# Patient Record
Sex: FEMALE | Race: WHITE | HISPANIC OR LATINO | Employment: PART TIME | URBAN - METROPOLITAN AREA
[De-identification: names, ages, dates, MRNs, and addresses within clinical notes are randomized per-mention and may not be internally consistent; named-entity substitution may affect disease eponyms.]

---

## 2017-02-10 ENCOUNTER — OFFICE VISIT (OUTPATIENT)
Dept: FAMILY MEDICINE | Facility: CLINIC | Age: 57
End: 2017-02-10
Payer: COMMERCIAL

## 2017-02-10 VITALS
TEMPERATURE: 98.4 F | HEART RATE: 64 BPM | DIASTOLIC BLOOD PRESSURE: 72 MMHG | WEIGHT: 135 LBS | HEIGHT: 61 IN | SYSTOLIC BLOOD PRESSURE: 110 MMHG | BODY MASS INDEX: 25.49 KG/M2 | RESPIRATION RATE: 16 BRPM
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
## 2017-02-10 DIAGNOSIS — Z11.59 NEED FOR HEPATITIS C SCREENING TEST: ICD-10-CM

## 2017-02-10 DIAGNOSIS — Z00.00 ROUTINE GENERAL MEDICAL EXAMINATION AT A HEALTH CARE FACILITY: Primary | ICD-10-CM

## 2017-02-10 PROCEDURE — 80048 BASIC METABOLIC PNL TOTAL CA: CPT | Performed by: FAMILY MEDICINE

## 2017-02-10 PROCEDURE — 99396 PREV VISIT EST AGE 40-64: CPT | Performed by: FAMILY MEDICINE

## 2017-02-10 PROCEDURE — 86803 HEPATITIS C AB TEST: CPT | Performed by: FAMILY MEDICINE

## 2017-02-10 PROCEDURE — 36415 COLL VENOUS BLD VENIPUNCTURE: CPT | Performed by: FAMILY MEDICINE

## 2017-02-10 RX ORDER — OSELTAMIVIR PHOSPHATE 75 MG/1
CAPSULE ORAL
COMMUNITY
Start: 2017-02-08 | End: 2017-02-10

## 2017-02-10 RX ORDER — OSELTAMIVIR PHOSPHATE 75 MG/1
75 CAPSULE ORAL
COMMUNITY
Start: 2017-02-08 | End: 2017-02-13

## 2017-02-10 RX ORDER — IBUPROFEN 800 MG/1
TABLET, FILM COATED ORAL
COMMUNITY
Start: 2017-02-08 | End: 2018-03-23

## 2017-02-10 NOTE — LETTER
[facility]  [address] [place], [address]  [phone]      February 13, 2017    Mary Carbajal  [address] [facility]   [address] [place] [id number]          Dear Mary,    The results of your recent tests were normal.  Enclosed is a copy of the results.  It was a pleasure to see you at your last appointment.  Results for orders placed or performed in visit on 02/10/17   Hepatitis C antibody   Result Value Ref Range    Hepatitis C Antibody  NR     Nonreactive   Assay performance characteristics have not been established for newborns,   infants, and children     Basic metabolic panel   Result Value Ref Range    Sodium 139 133 - 144 mmol/L    Potassium 4.8 3.4 - 5.3 mmol/L    Chloride 106 94 - 109 mmol/L    Carbon Dioxide 27 20 - 32 mmol/L    Anion Gap 6 3 - 14 mmol/L    Glucose 98 70 - 99 mg/dL    Urea Nitrogen 13 7 - 30 mg/dL    Creatinine 0.90 0.52 - 1.04 mg/dL    GFR Estimate 65 >60 mL/min/1.7m2    GFR Estimate If Black 79 >60 mL/min/1.7m2    Calcium 8.8 8.5 - 10.1 mg/dL       If you have any questions or concerns, please call myself or my nurse at [phone].          Sincerely,    Arti Allen MD  [id number]

## 2017-02-10 NOTE — MR AVS SNAPSHOT
After Visit Summary   2/10/2017    Mary Carbajal    MRN: [id number]           Patient Information     Date Of Birth          1960        Visit Information        Provider Department      2/10/2017 10:30 AM Arti Allen MD; [employer] [facility]        Today's Diagnoses     Need for hepatitis C screening test    -  1     Routine general medical examination at a health care facility           Care Instructions      Preventive Health Recommendations  Female Ages 50 - 64    Yearly exam: See your health care provider every year in order to  o Review health changes.   o Discuss preventive care.    o Review your medicines if your doctor has prescribed any.      Get a Pap test every three years (unless you have an abnormal result and your provider advises testing more often).    If you get Pap tests with HPV test, you only need to test every 5 years, unless you have an abnormal result.     You do not need a Pap test if your uterus was removed (hysterectomy) and you have not had cancer.    You should be tested each year for STDs (sexually transmitted diseases) if you're at risk.     Have a mammogram every 1 to 2 years.    Have a colonoscopy at age 50, or have a yearly FIT test (stool test). These exams screen for colon cancer.      Have a cholesterol test every 5 years, or more often if advised.    Have a diabetes test (fasting glucose) every three years. If you are at risk for diabetes, you should have this test more often.     If you are at risk for osteoporosis (brittle bone disease), think about having a bone density scan (DEXA).    Shots: Get a flu shot each year. Get a tetanus shot every 10 years.    Nutrition:     Eat at least 5 servings of fruits and vegetables each day.    Eat whole-grain bread, whole-wheat pasta and brown rice instead of white grains and rice.    Talk to your provider about Calcium and Vitamin D.     Lifestyle    Exercise at  "least 150 minutes a week (30 minutes a day, 5 days a week). This will help you control your weight and prevent disease.    Limit alcohol to one drink per day.    No smoking.     Wear sunscreen to prevent skin cancer.     See your dentist every six months for an exam and cleaning.    See your eye doctor every 1 to 2 years.            Follow-ups after your visit        Future tests that were ordered for you today     Open Future Orders        Priority Expected Expires Ordered    *MA Screening Digital Bilateral Routine  2/10/2018 2/10/2017            Who to contact     If you have questions or need follow up information about today's clinic visit or your schedule please contact [facility] directly at [phone].  Normal or non-critical lab and imaging results will be communicated to you by MyChart, letter or phone within 4 business days after the clinic has received the results. If you do not hear from us within 7 days, please contact the clinic through Fundlyhart or phone. If you have a critical or abnormal lab result, we will notify you by phone as soon as possible.  Submit refill requests through MainOne or call your pharmacy and they will forward the refill request to us. Please allow 3 business days for your refill to be completed.          Additional Information About Your Visit        FundlyharStockdrift Information     MainOne lets you send messages to your doctor, view your test results, renew your prescriptions, schedule appointments and more. To sign up, go to [email or web address] . Click on \"Log in\" on the left side of the screen, which will take you to the Welcome page. Then click on \"Sign up Now\" on the right side of the page.     You will be asked to enter the access code listed below, as well as some personal information. Please follow the directions to create your username and password.     Your access code is: [email or web address]  Expires: 2017 10:49 AM     Your access code will  in 90 " "days. If you need help or a new code, please call your [facility] clinic or [phone].        Care EveryWhere ID     This is your Care EveryWhere ID. This could be used by other organizations to access your [facility] medical records  [id number]        Your Vitals Were     Pulse Temperature Respirations Height BMI (Body Mass Index) Breastfeeding?    64 98.4  F (36.9  C) (Oral) 16 5' 1.25\" (1.556 m) 25.29 kg/m2 No       Blood Pressure from Last 3 Encounters:   02/10/17 110/72   11/18/16 102/60   10/20/16 112/62    Weight from Last 3 Encounters:   02/10/17 135 lb (61.236 kg)   11/18/16 133 lb (60.328 kg)   10/20/16 133 lb 8 oz (60.555 kg)              We Performed the Following     Basic metabolic panel     Hepatitis C antibody        Primary Care Provider Office Phone # Fax #    Elijah Ng [phone]       [facility] [id number] [place] [address]        Thank you!     Thank you for choosing [facility]  for your care. Our goal is always to provide you with excellent care. Hearing back from our patients is one way we can continue to improve our services. Please take a few minutes to complete the written survey that you may receive in the mail after your visit with us. Thank you!             Your Updated Medication List - Protect others around you: Learn how to safely use, store and throw away your medicines at www.disposemymeds.org.          This list is accurate as of: 2/10/17 10:49 AM.  Always use your most recent med list.                   Brand Name Dispense Instructions for use    fluticasone 50 MCG/ACT spray    FLONASE    1 Bottle    Spray 2 sprays into both nostrils daily       ibuprofen 800 MG tablet    ADVIL/MOTRIN     1 tab prn       ranitidine 150 MG capsule    ZANTAC    90 capsule    Take 1 capsule (150 mg) by mouth every evening       TAMIFLU 75 MG capsule   Generic drug:  oseltamivir      Take 75 mg by mouth 1 tab TWICE A DAY x5 days "
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
## 2017-02-10 NOTE — NURSING NOTE
"Chief Complaint   Patient presents with     Physical     PE---no pap, had normal pap 10/2015     Initial /72 mmHg  Pulse 64  Temp(Src) 98.4  F (36.9  C) (Oral)  Resp 16  Ht 5' 1.25\" (1.556 m)  Wt 135 lb (61.236 kg)  BMI 25.29 kg/m2  Breastfeeding? No Estimated body mass index is 25.29 kg/(m^2) as calculated from the following:    Height as of this encounter: 5' 1.25\" (1.556 m).    Weight as of this encounter: 135 lb (61.236 kg)..  BP completed using cuff size regular.            Adore Mancia/EDER    "

## 2017-02-11 LAB
ANION GAP SERPL CALCULATED.3IONS-SCNC: 6 MMOL/L (ref 3–14)
BUN SERPL-MCNC: 13 MG/DL (ref 7–30)
CALCIUM SERPL-MCNC: 8.8 MG/DL (ref 8.5–10.1)
CHLORIDE SERPL-SCNC: 106 MMOL/L (ref 94–109)
CO2 SERPL-SCNC: 27 MMOL/L (ref 20–32)
CREAT SERPL-MCNC: 0.9 MG/DL (ref 0.52–1.04)
GFR SERPL CREATININE-BSD FRML MDRD: 65 ML/MIN/1.7M2
GLUCOSE SERPL-MCNC: 98 MG/DL (ref 70–99)
POTASSIUM SERPL-SCNC: 4.8 MMOL/L (ref 3.4–5.3)
SODIUM SERPL-SCNC: 139 MMOL/L (ref 133–144)

## 2017-02-13 LAB — HCV AB SERPL QL IA: NORMAL

## 2017-02-13 NOTE — PROGRESS NOTES
Please send patient a letter to let them know results are normal.    All labs from recent office visit are negative.   For further questions or concerns please let us know.      Sincerely,    Dr. May

## 2017-03-21 ENCOUNTER — RADIANT APPOINTMENT (OUTPATIENT)
Dept: MAMMOGRAPHY | Facility: CLINIC | Age: 57
End: 2017-03-21
Attending: FAMILY MEDICINE
Payer: COMMERCIAL

## 2017-03-21 DIAGNOSIS — Z00.00 ROUTINE GENERAL MEDICAL EXAMINATION AT A HEALTH CARE FACILITY: ICD-10-CM

## 2017-03-21 PROCEDURE — G0202 SCR MAMMO BI INCL CAD: HCPCS | Mod: TC

## 2017-08-17 ENCOUNTER — OFFICE VISIT (OUTPATIENT)
Dept: FAMILY MEDICINE | Facility: CLINIC | Age: 57
End: 2017-08-17
Payer: COMMERCIAL

## 2017-08-17 VITALS
HEIGHT: 61 IN | WEIGHT: 131.6 LBS | SYSTOLIC BLOOD PRESSURE: 122 MMHG | TEMPERATURE: 97.7 F | HEART RATE: 66 BPM | BODY MASS INDEX: 24.84 KG/M2 | RESPIRATION RATE: 14 BRPM | DIASTOLIC BLOOD PRESSURE: 77 MMHG

## 2017-08-17 DIAGNOSIS — M54.2 CERVICALGIA: Primary | ICD-10-CM

## 2017-08-17 PROCEDURE — 99214 OFFICE O/P EST MOD 30 MIN: CPT | Performed by: FAMILY MEDICINE

## 2017-08-17 RX ORDER — VENLAFAXINE HYDROCHLORIDE 37.5 MG/1
CAPSULE, EXTENDED RELEASE ORAL
Qty: 15 CAPSULE | Refills: 0 | Status: SHIPPED | OUTPATIENT
Start: 2017-08-17 | End: 2018-03-23

## 2017-08-17 RX ORDER — VENLAFAXINE HYDROCHLORIDE 150 MG/1
150 CAPSULE, EXTENDED RELEASE ORAL DAILY
Qty: 30 CAPSULE | Refills: 1 | Status: SHIPPED | OUTPATIENT
Start: 2017-08-17 | End: 2018-03-23

## 2017-08-17 NOTE — PROGRESS NOTES
SUBJECTIVE:                                                    Mary Carbajal is a 57 year old female who presents to clinic today for the following health issues:      Neck Pain  Onset: 2 months     Description:   Location: neck  Radiation: into the right neck    Intensity: moderate, severe    Progression of Symptoms:  same    Accompanying Signs & Symptoms:  Burning, prickly sensation (paresthesias) in arm(s): no   Numbness in arm(s): no   Weakness in arm(s):  no   Fever: no   Headache: no   Nausea and/or vomiting: no     History:   Trauma: no   Previous neck pain: no   Previous surgery or injections: no   Previous Imaging (MRI,X ray): no     Precipitating factors:   Does movement increase the pain:  YES    Alleviating factors:      Therapies Tried and outcome: Tylenol and ibuprofen, not helpful.     Tylenol and ibuprofen, pain doesn't go away, has never had it before, could be stress, never had neck pain before.     Pt interested in medication first.   Stress: Does not have particular problems, just work- she has too much at work, coworkers don't do enough  Can't sleep because the pain makes her get up at night. Can't use pillow.   Cervicalgia  (primary encounter diagnosis): The patient complains of neck pain for 2 months. She says she has never experienced neck pain before. She notes that she has been under a lot of stress with work. The patient says the pain keeps her up at night.      Problem list and histories reviewed & adjusted, as indicated.  Additional history: none        Reviewed and updated as needed this visit by clinical staffTobacco  Allergies  Meds  Med Hx  Surg Hx  Fam Hx  Soc Hx       History reviewed. No pertinent past medical history.    Past Surgical History:   Procedure Laterality Date     HYSTERECTOMY, VAGINAL       LAPAROSCOPIC APPENDECTOMY         Family History   Problem Relation Age of Onset     DIABETES Mother      DIABETES Brother      DIABETES Brother        Social History  "  Substance Use Topics     Smoking status: Never Smoker     Smokeless tobacco: Never Used     Alcohol use No       ROS  No rash nor headache    Reviewed and updated as needed this visit by Provider   This document serves as a record of the services and decisions personally performed and made by David Bustos MD. It was created on his behalf by Julia Sutton, a trained medical scribe.  The creation of this document is based on the scribe's personal observations and the provider's statements to the medical scribe.  Julia Sutton, August 17, 2017 10:14 AM    Objective  /77 (BP Location: Right arm, Patient Position: Chair, Cuff Size: Adult Regular)  Pulse 66  Temp 97.7  F (36.5  C) (Oral)  Resp 14  Ht 1.556 m (5' 1.25\")  Wt 59.7 kg (131 lb 9.6 oz)  Breastfeeding? No  BMI 24.66 kg/m2  Negative Spurling's. Tender along the right trapezius and nuchal ridge. No cervical nodes. Lateral flexion right and rotation left painful with some restriction  ASSESSMENT / PLAN:  (M54.2) Cervicalgia  (primary encounter diagnosis)  Comment: Discussed various options. She is interested in medicine first. Treat  Plan: venlafaxine (EFFEXOR-XR) 37.5 MG 24 hr capsule,        venlafaxine (EFFEXOR-XR) 150 MG 24 hr capsule      RTC 4 weeks    The information in this document, created by the medical scribe for me, accurately reflects the services I personally performed and the decisions made by me. I have reviewed and approved this document for accuracy prior to leaving the patient care area.  David Bustos MD August 17, 2017 10:14 AM        "

## 2017-08-17 NOTE — MR AVS SNAPSHOT
"              After Visit Summary   2017    Mary Carbajal    MRN: [id number]           Patient Information     Date Of Birth          1960        Visit Information        Provider Department      2017 9:30 AM David Bustos MD; [employer] [facility]        Today's Diagnoses     Cervicalgia    -  1      Care Instructions    Hot water bottle          Follow-ups after your visit        Follow-up notes from your care team     Return in about 4 weeks (around 2017).      Who to contact     If you have questions or need follow up information about today's clinic visit or your schedule please contact [facility] directly at [phone].  Normal or non-critical lab and imaging results will be communicated to you by MyChart, letter or phone within 4 business days after the clinic has received the results. If you do not hear from us within 7 days, please contact the clinic through MyChart or phone. If you have a critical or abnormal lab result, we will notify you by phone as soon as possible.  Submit refill requests through Flukle or call your pharmacy and they will forward the refill request to us. Please allow 3 business days for your refill to be completed.          Additional Information About Your Visit        MyChart Information     Flukle lets you send messages to your doctor, view your test results, renew your prescriptions, schedule appointments and more. To sign up, go to [email or web address] . Click on \"Log in\" on the left side of the screen, which will take you to the Welcome page. Then click on \"Sign up Now\" on the right side of the page.     You will be asked to enter the access code listed below, as well as some personal information. Please follow the directions to create your username and password.     Your access code is: [email or web address]  Expires: 11/15/2017 10:21 AM     Your access code will  in 90 days. If you need help or " "a new code, please call your [facility] or [phone].        Care EveryWhere ID     This is your Care EveryWhere ID. This could be used by other organizations to access your [place] medical records  [id number]        Your Vitals Were     Pulse Temperature Respirations Height Breastfeeding? BMI (Body Mass Index)    66 97.7  F (36.5  C) (Oral) 14 5' 1.25\" (1.556 m) No 24.66 kg/m2       Blood Pressure from Last 3 Encounters:   08/17/17 122/77   02/10/17 110/72   11/18/16 102/60    Weight from Last 3 Encounters:   08/17/17 131 lb 9.6 oz (59.7 kg)   02/10/17 135 lb (61.2 kg)   11/18/16 133 lb (60.3 kg)              Today, you had the following     No orders found for display         Today's Medication Changes          These changes are accurate as of: 8/17/17 10:21 AM.  If you have any questions, ask your nurse or doctor.               Start taking these medicines.        Dose/Directions    * venlafaxine 37.5 MG 24 hr capsule   Commonly known as:  EFFEXOR-XR   Used for:  Cervicalgia   Started by:  David Bustos MD        Take 1 capsule daily for 7 days, then take 2 capsules daily  X 7 days then switch.   Quantity:  15 capsule   Refills:  0       * venlafaxine 150 MG 24 hr capsule   Commonly known as:  EFFEXOR-XR   Used for:  Cervicalgia   Started by:  David Bustos MD        Dose:  150 mg   Take 1 capsule (150 mg) by mouth daily Use second   Quantity:  30 capsule   Refills:  1       * Notice:  This list has 2 medication(s) that are the same as other medications prescribed for you. Read the directions carefully, and ask your doctor or other care provider to review them with you.         Where to get your medicines      These medications were sent to [facility] [address]  [address] [facility] [address]     Phone:  [phone]     venlafaxine 150 MG 24 hr capsule    venlafaxine 37.5 MG 24 hr capsule                Primary Care Provider Office Phone # Fax " #    David Bustos -[phone]       [address] [facility] [address]        Equal Access to Services     GERMAINMONIK KARINA : Lobo matias gatica bret Velásquez, minesh redi, shelley karadhada shaylee, jamaica dunlapmattie anatoly. So [facility] [phone].    ATENCIÓN: Si habla español, tiene a weldon disposición servicios gratuitos de asistencia lingüística. Llame al [phone].    We comply with applicable federal civil rights laws and [place] laws. We do not discriminate on the basis of race, color, national origin, age, disability sex, sexual orientation or gender identity.            Thank you!     Thank you for choosing [facility]  for your care. Our goal is always to provide you with excellent care. Hearing back from our patients is one way we can continue to improve our services. Please take a few minutes to complete the written survey that you may receive in the mail after your visit with us. Thank you!             Your Updated Medication List - Protect others around you: Learn how to safely use, store and throw away your medicines at www.disposemymeds.org.          This list is accurate as of: 8/17/17 10:21 AM.  Always use your most recent med list.                   Brand Name Dispense Instructions for use Diagnosis    fluticasone 50 MCG/ACT spray    FLONASE    1 Bottle    Spray 2 sprays into both nostrils daily    Seasonal allergic rhinitis, unspecified allergic rhinitis trigger       ibuprofen 800 MG tablet    ADVIL/MOTRIN     1 tab prn        * venlafaxine 37.5 MG 24 hr capsule    EFFEXOR-XR    15 capsule    Take 1 capsule daily for 7 days, then take 2 capsules daily  X 7 days then switch.    Cervicalgia       * venlafaxine 150 MG 24 hr capsule    EFFEXOR-XR    30 capsule    Take 1 capsule (150 mg) by mouth daily Use second    Cervicalgia       * Notice:  This list has 2 medication(s) that are the same as other medications prescribed for you. Read the  directions carefully, and ask your doctor or other care provider to review them with you.

## 2017-08-17 NOTE — NURSING NOTE
"Chief Complaint   Patient presents with     Neck Pain     x 2 months, not able to sleep        Initial /77 (BP Location: Right arm, Patient Position: Chair, Cuff Size: Adult Regular)  Pulse 66  Temp 97.7  F (36.5  C) (Oral)  Resp 14  Ht 5' 1.25\" (1.556 m)  Wt 131 lb 9.6 oz (59.7 kg)  Breastfeeding? No  BMI 24.66 kg/m2 Estimated body mass index is 24.66 kg/(m^2) as calculated from the following:    Height as of this encounter: 5' 1.25\" (1.556 m).    Weight as of this encounter: 131 lb 9.6 oz (59.7 kg).  Medication Reconciliation: complete rt arm\Katy Owens MA        "

## 2018-03-23 ENCOUNTER — OFFICE VISIT (OUTPATIENT)
Dept: FAMILY MEDICINE | Facility: CLINIC | Age: 58
End: 2018-03-23
Payer: COMMERCIAL

## 2018-03-23 ENCOUNTER — RADIANT APPOINTMENT (OUTPATIENT)
Dept: MAMMOGRAPHY | Facility: CLINIC | Age: 58
End: 2018-03-23
Attending: FAMILY MEDICINE
Payer: COMMERCIAL

## 2018-03-23 VITALS
HEIGHT: 62 IN | HEART RATE: 56 BPM | TEMPERATURE: 97.8 F | DIASTOLIC BLOOD PRESSURE: 68 MMHG | BODY MASS INDEX: 24.48 KG/M2 | RESPIRATION RATE: 12 BRPM | OXYGEN SATURATION: 99 % | WEIGHT: 133 LBS | SYSTOLIC BLOOD PRESSURE: 110 MMHG

## 2018-03-23 DIAGNOSIS — Z12.4 ENCOUNTER FOR PAPANICOLAOU SMEAR FOR CERVICAL CANCER SCREENING: ICD-10-CM

## 2018-03-23 DIAGNOSIS — F34.1 DYSTHYMIA: ICD-10-CM

## 2018-03-23 DIAGNOSIS — J30.89 CHRONIC ALLERGIC RHINITIS DUE TO OTHER ALLERGIC TRIGGER, UNSPECIFIED SEASONALITY: ICD-10-CM

## 2018-03-23 DIAGNOSIS — Z12.31 VISIT FOR SCREENING MAMMOGRAM: ICD-10-CM

## 2018-03-23 DIAGNOSIS — Z00.00 ROUTINE HISTORY AND PHYSICAL EXAMINATION OF ADULT: Primary | ICD-10-CM

## 2018-03-23 LAB
BASOPHILS # BLD AUTO: 0 10E9/L (ref 0–0.2)
BASOPHILS NFR BLD AUTO: 0.5 %
DIFFERENTIAL METHOD BLD: NORMAL
EOSINOPHIL # BLD AUTO: 0 10E9/L (ref 0–0.7)
EOSINOPHIL NFR BLD AUTO: 0 %
ERYTHROCYTE [DISTWIDTH] IN BLOOD BY AUTOMATED COUNT: 14.6 % (ref 10–15)
HCT VFR BLD AUTO: 39.3 % (ref 35–47)
HGB BLD-MCNC: 13.3 G/DL (ref 11.7–15.7)
LYMPHOCYTES # BLD AUTO: 2.3 10E9/L (ref 0.8–5.3)
LYMPHOCYTES NFR BLD AUTO: 38.2 %
MCH RBC QN AUTO: 27.4 PG (ref 26.5–33)
MCHC RBC AUTO-ENTMCNC: 33.8 G/DL (ref 31.5–36.5)
MCV RBC AUTO: 81 FL (ref 78–100)
MONOCYTES # BLD AUTO: 0.4 10E9/L (ref 0–1.3)
MONOCYTES NFR BLD AUTO: 6.6 %
NEUTROPHILS # BLD AUTO: 3.3 10E9/L (ref 1.6–8.3)
NEUTROPHILS NFR BLD AUTO: 54.7 %
PLATELET # BLD AUTO: 202 10E9/L (ref 150–450)
RBC # BLD AUTO: 4.86 10E12/L (ref 3.8–5.2)
WBC # BLD AUTO: 6 10E9/L (ref 4–11)

## 2018-03-23 PROCEDURE — 80061 LIPID PANEL: CPT | Performed by: FAMILY MEDICINE

## 2018-03-23 PROCEDURE — 87624 HPV HI-RISK TYP POOLED RSLT: CPT | Performed by: FAMILY MEDICINE

## 2018-03-23 PROCEDURE — 80053 COMPREHEN METABOLIC PANEL: CPT | Performed by: FAMILY MEDICINE

## 2018-03-23 PROCEDURE — 77067 SCR MAMMO BI INCL CAD: CPT | Mod: TC

## 2018-03-23 PROCEDURE — 85025 COMPLETE CBC W/AUTO DIFF WBC: CPT | Performed by: FAMILY MEDICINE

## 2018-03-23 PROCEDURE — 36415 COLL VENOUS BLD VENIPUNCTURE: CPT | Performed by: FAMILY MEDICINE

## 2018-03-23 PROCEDURE — G0123 SCREEN CERV/VAG THIN LAYER: HCPCS | Performed by: FAMILY MEDICINE

## 2018-03-23 PROCEDURE — 99396 PREV VISIT EST AGE 40-64: CPT | Performed by: FAMILY MEDICINE

## 2018-03-23 RX ORDER — FLUTICASONE PROPIONATE 50 MCG
2 SPRAY, SUSPENSION (ML) NASAL DAILY
Qty: 1 BOTTLE | Refills: 11 | Status: SHIPPED | OUTPATIENT
Start: 2018-03-23 | End: 2018-09-14

## 2018-03-23 ASSESSMENT — ANXIETY QUESTIONNAIRES
5. BEING SO RESTLESS THAT IT IS HARD TO SIT STILL: NOT AT ALL
IF YOU CHECKED OFF ANY PROBLEMS ON THIS QUESTIONNAIRE, HOW DIFFICULT HAVE THESE PROBLEMS MADE IT FOR YOU TO DO YOUR WORK, TAKE CARE OF THINGS AT HOME, OR GET ALONG WITH OTHER PEOPLE: SOMEWHAT DIFFICULT
7. FEELING AFRAID AS IF SOMETHING AWFUL MIGHT HAPPEN: NOT AT ALL
GAD7 TOTAL SCORE: 1
2. NOT BEING ABLE TO STOP OR CONTROL WORRYING: NOT AT ALL
3. WORRYING TOO MUCH ABOUT DIFFERENT THINGS: NOT AT ALL
1. FEELING NERVOUS, ANXIOUS, OR ON EDGE: SEVERAL DAYS
6. BECOMING EASILY ANNOYED OR IRRITABLE: NOT AT ALL

## 2018-03-23 ASSESSMENT — PATIENT HEALTH QUESTIONNAIRE - PHQ9: 5. POOR APPETITE OR OVEREATING: NOT AT ALL

## 2018-03-23 NOTE — PROGRESS NOTES
SUBJECTIVE:   CC: Mary Carbajal is an 58 year old woman who presents for preventive health visit.     Physical   Annual:     Getting at least 3 servings of Calcium per day::  Yes    Bi-annual eye exam::  Yes    Dental care twice a year::  Yes    Sleep apnea or symptoms of sleep apnea::  Daytime drowsiness    Diet::  Low salt, Low fat/cholesterol and Carbohydrate counting    Frequency of exercise::  4-5 days/week    Duration of exercise::  Greater than 60 minutes    Taking medications regularly::  Not Applicable    Additional concerns today::  YES            Depression Followup    Status since last visit: Stable     See PHQ-9 for current symptoms.  Other associated symptoms: None    Complicating factors:   Significant life event:  Yes-  Mother in law came to visit for two months    Current substance abuse:  None  Anxiety or Panic symptoms:  No    Patient was previously taking Effexor.  She stopped taking it about 1 year ago.  Her mother in law recently came to stay with her and she noticed her depression was worsening.  The mother in law is gone now and she is actually feeling better.  She is considering starting a different depression medication.    PHQ-9 3/23/2018   Total Score 4   Q9: Suicide Ideation Not at all     In the past two weeks have you had thoughts of suicide or self-harm?  No.    Do you have concerns about your personal safety or the safety of others?   No  PHQ-9  English  PHQ-9   Any Language  Suicide Assessment Five-step Evaluation and Treatment (SAFE-T)    Today's PHQ-2 Score:   PHQ-2 ( 1999 [employer]) 3/23/2018   Q1: Little interest or pleasure in doing things 1   Q2: Feeling down, depressed or hopeless 1   PHQ-2 Score 2   Q1: Little interest or pleasure in doing things Several days   Q2: Feeling down, depressed or hopeless Several days   PHQ-2 Score 2       Abuse: Current or Past(Physical, Sexual or Emotional)- No  Do you feel safe in your environment - Yes    Social History   Substance Use  Topics     Smoking status: Never Smoker     Smokeless tobacco: Never Used     Alcohol use No     Alcohol Use 3/23/2018   If you drink alcohol do you typically have greater than 3 drinks per day OR greater than 7 drinks per week? No   No flowsheet data found.    Reviewed orders with patient.  Reviewed health maintenance and updated orders accordingly - Yes  Patient Active Problem List   Diagnosis     CARDIOVASCULAR SCREENING; LDL GOAL LESS THAN 160     GERD (gastroesophageal reflux disease)     Family history of diabetes mellitus     Vaginal atrophy     Seasonal allergic rhinitis, unspecified allergic rhinitis trigger     Past Surgical History:   Procedure Laterality Date     HYSTERECTOMY, VAGINAL       LAPAROSCOPIC APPENDECTOMY         Social History   Substance Use Topics     Smoking status: Never Smoker     Smokeless tobacco: Never Used     Alcohol use No     Family History   Problem Relation Age of Onset     DIABETES Mother      DIABETES Brother      DIABETES Brother      DIABETES Brother            Patient over age 50, mutual decision to screen reflected in health maintenance.    Pertinent mammograms are reviewed under the imaging tab.  History of abnormal Pap smear:   NO - age 30- 65 PAP every 3 years recommended  NO - age 30-65 PAP every 5 years with negative HPV co-testing recommended  Last 3 Pap and HPV Results:   PAP / HPV 10/8/2015 9/1/2015 4/17/2015   PAP NIL UNSAT UNSAT       Reviewed and updated as needed this visit by clinical staff  Tobacco  Allergies  Meds  Med Hx  Surg Hx  Fam Hx  Soc Hx        Reviewed and updated as needed this visit by Provider            Review of Systems  C: NEGATIVE for fever, chills, change in weight  I: NEGATIVE for worrisome rashes, moles or lesions  E: NEGATIVE for vision changes or irritation  ENT: NEGATIVE for ear, mouth and throat problems  R: NEGATIVE for significant cough or SOB  B: NEGATIVE for masses, tenderness or discharge  CV: NEGATIVE for chest pain,  "palpitations or peripheral edema  GI: NEGATIVE for nausea, abdominal pain, heartburn, or change in bowel habits  : NEGATIVE for unusual urinary or vaginal symptoms. No vaginal bleeding.  M: NEGATIVE for significant arthralgias or myalgia  N: NEGATIVE for weakness, dizziness or paresthesias  P: NEGATIVE for changes in mood or affect      OBJECTIVE:   /68 (BP Location: Right arm, Patient Position: Chair, Cuff Size: Adult Regular)  Pulse 56  Temp 97.8  F (36.6  C) (Oral)  Resp 12  Ht 5' 1.5\" (1.562 m)  Wt 133 lb (60.3 kg)  SpO2 99%  BMI 24.72 kg/m2  Physical Exam  GENERAL APPEARANCE: healthy, alert and no distress  EYES: Eyes grossly normal to inspection, PERRL and conjunctivae and sclerae normal  HENT: ear canals and TM's normal, nose and mouth without ulcers or lesions, oropharynx clear and oral mucous membranes moist  NECK: no adenopathy, no asymmetry, masses, or scars and thyroid normal to palpation  RESP: lungs clear to auscultation - no rales, rhonchi or wheezes  BREAST: normal without masses, tenderness or nipple discharge and no palpable axillary masses or adenopathy  CV: regular rate and rhythm, normal S1 S2, no S3 or S4, no murmur, click or rub, no peripheral edema and peripheral pulses strong  ABDOMEN: soft, nontender, no hepatosplenomegaly, no masses and bowel sounds normal   (female): normal female external genitalia, normal urethral meatus, vaginal mucosal atrophy noted, normal cervix, adnexae, and uterus without masses or abnormal discharge  MS: no musculoskeletal defects are noted and gait is age appropriate without ataxia  SKIN: no suspicious lesions or rashes  NEURO: Normal strength and tone, sensory exam grossly normal, mentation intact and speech normal  PSYCH: mentation appears normal and affect normal/bright    ASSESSMENT/PLAN:   1. Routine history and physical examination of adult  - Lipid panel reflex to direct LDL Fasting  - Comprehensive metabolic panel  - CBC with platelets " "differential    2. Encounter for Papanicolaou smear for cervical cancer screening  - Pap imaged thin layer screen with HPV - recommended age 30 - 65 years (select HPV order below)  - HPV High Risk Types DNA Cervical    3. Chronic allergic rhinitis due to other allergic trigger, unspecified seasonality  - fluticasone (FLONASE) 50 MCG/ACT spray; Spray 2 sprays into both nostrils daily  Dispense: 1 Bottle; Refill: 11    4. Dysthymia  - Patient scored a 4 on PHQ today  - Discussed that patients don't usually start medications for depression with a score this low  - I'm happy to start a new SSRI for her if needed, but she's ok with holding off for now.  If needed will start Prozac or Zoloft  - Consider taking an OTC Vitamin D supplement       COUNSELING:  Reviewed preventive health counseling, as reflected in patient instructions         reports that she has never smoked. She has never used smokeless tobacco.    Estimated body mass index is 24.72 kg/(m^2) as calculated from the following:    Height as of this encounter: 5' 1.5\" (1.562 m).    Weight as of this encounter: 133 lb (60.3 kg).       Counseling Resources:  ATP IV Guidelines  Pooled Cohorts Equation Calculator  Breast Cancer Risk Calculator  FRAX Risk Assessment  ICSI Preventive Guidelines  Dietary Guidelines for Americans, 2010  USDA's MyPlate  ASA Prophylaxis  Lung CA Screening    Arielle Winkler DO  [facility]  Answers for HPI/ROS submitted by the patient on 3/23/2018   PHQ-2 Score: 2    "

## 2018-03-23 NOTE — LETTER
March 26, 2018      Mary Carbajal  [address]  [place] [facility] [address]        Dear MsDevon,    We are writing to inform you of your test results.    Your test results fall within the expected range(s) or remain unchanged from previous results.  Please continue with current treatment plan.    Resulted Orders   Lipid panel reflex to direct LDL Fasting   Result Value Ref Range    Cholesterol 158 <200 mg/dL    Triglycerides 75 <150 mg/dL      Comment:      Fasting specimen    HDL Cholesterol 61 >49 mg/dL    LDL Cholesterol Calculated 82 <100 mg/dL      Comment:      Desirable:       <100 mg/dl    Non HDL Cholesterol 97 <130 mg/dL   Comprehensive metabolic panel   Result Value Ref Range    Sodium 142 133 - 144 mmol/L    Potassium 4.9 3.4 - 5.3 mmol/L    Chloride 110 (H) 94 - 109 mmol/L    Carbon Dioxide 28 20 - 32 mmol/L    Anion Gap 4 3 - 14 mmol/L    Glucose 90 70 - 99 mg/dL      Comment:      Fasting specimen    Urea Nitrogen 12 7 - 30 mg/dL    Creatinine 0.80 0.52 - 1.04 mg/dL    GFR Estimate 74 >60 mL/min/1.7m2      Comment:      Non  GFR Calc    GFR Estimate If Black 89 >60 mL/min/1.7m2      Comment:       GFR Calc    Calcium 9.0 8.5 - 10.1 mg/dL    Bilirubin Total 0.4 0.2 - 1.3 mg/dL    Albumin 3.9 3.4 - 5.0 g/dL    Protein Total 7.3 6.8 - 8.8 g/dL    Alkaline Phosphatase 62 40 - 150 U/L    ALT 21 0 - 50 U/L    AST 22 0 - 45 U/L   CBC with platelets differential   Result Value Ref Range    WBC 6.0 4.0 - 11.0 10e9/L    RBC Count 4.86 3.8 - 5.2 10e12/L    Hemoglobin 13.3 11.7 - 15.7 g/dL    Hematocrit 39.3 35.0 - 47.0 %    MCV 81 78 - 100 fl    MCH 27.4 26.5 - 33.0 pg    MCHC 33.8 31.5 - 36.5 g/dL    RDW 14.6 10.0 - 15.0 %    Platelet Count 202 150 - 450 10e9/L    Diff Method Automated Method     % Neutrophils 54.7 %    % Lymphocytes 38.2 %    % Monocytes 6.6 %    % Eosinophils 0.0 %    % Basophils 0.5 %    Absolute Neutrophil 3.3 1.6 - 8.3 10e9/L    Absolute  Lymphocytes 2.3 0.8 - 5.3 10e9/L    Absolute Monocytes 0.4 0.0 - 1.3 10e9/L    Absolute Eosinophils 0.0 0.0 - 0.7 10e9/L    Absolute Basophils 0.0 0.0 - 0.2 10e9/L       If you have any questions or concerns, please call the clinic at the number listed above.       Sincerely,        Arielle Winkler, DO

## 2018-03-23 NOTE — LETTER
April 4, 2018    aMry Carbajal  [address]  [facility] [address]    Dear Mary,  We are happy to inform you that your PAP smear result from 03/23/18 is normal.  We are now able to do a follow up test on PAP smears. The DNA test is for HPV (Human Papilloma Virus). Cervical cancer is closely linked with certain types of HPV. Your result showed no evidence of high risk HPV.  Therefore we recommend you return in 5 years for your next pap smear and HPV test.  You will still need to return to the clinic every year for an annual exam and other preventive tests.  Please contact the clinic at [phone] with any questions.  Sincerely,    Arielle Winkler DO/jovanna

## 2018-03-24 LAB
ALBUMIN SERPL-MCNC: 3.9 G/DL (ref 3.4–5)
ALP SERPL-CCNC: 62 U/L (ref 40–150)
ALT SERPL W P-5'-P-CCNC: 21 U/L (ref 0–50)
ANION GAP SERPL CALCULATED.3IONS-SCNC: 4 MMOL/L (ref 3–14)
AST SERPL W P-5'-P-CCNC: 22 U/L (ref 0–45)
BILIRUB SERPL-MCNC: 0.4 MG/DL (ref 0.2–1.3)
BUN SERPL-MCNC: 12 MG/DL (ref 7–30)
CALCIUM SERPL-MCNC: 9 MG/DL (ref 8.5–10.1)
CHLORIDE SERPL-SCNC: 110 MMOL/L (ref 94–109)
CHOLEST SERPL-MCNC: 158 MG/DL
CO2 SERPL-SCNC: 28 MMOL/L (ref 20–32)
CREAT SERPL-MCNC: 0.8 MG/DL (ref 0.52–1.04)
GFR SERPL CREATININE-BSD FRML MDRD: 74 ML/MIN/1.7M2
GLUCOSE SERPL-MCNC: 90 MG/DL (ref 70–99)
HDLC SERPL-MCNC: 61 MG/DL
LDLC SERPL CALC-MCNC: 82 MG/DL
NONHDLC SERPL-MCNC: 97 MG/DL
POTASSIUM SERPL-SCNC: 4.9 MMOL/L (ref 3.4–5.3)
PROT SERPL-MCNC: 7.3 G/DL (ref 6.8–8.8)
SODIUM SERPL-SCNC: 142 MMOL/L (ref 133–144)
TRIGL SERPL-MCNC: 75 MG/DL

## 2018-03-24 ASSESSMENT — PATIENT HEALTH QUESTIONNAIRE - PHQ9: SUM OF ALL RESPONSES TO PHQ QUESTIONS 1-9: 4

## 2018-03-24 ASSESSMENT — ANXIETY QUESTIONNAIRES: GAD7 TOTAL SCORE: 1

## 2018-03-27 LAB
COPATH REPORT: NORMAL
PAP: NORMAL

## 2018-03-29 LAB
FINAL DIAGNOSIS: NORMAL
HPV HR 12 DNA CVX QL NAA+PROBE: NEGATIVE
HPV16 DNA SPEC QL NAA+PROBE: NEGATIVE
HPV18 DNA SPEC QL NAA+PROBE: NEGATIVE
SPECIMEN DESCRIPTION: NORMAL
SPECIMEN SOURCE CVX/VAG CYTO: NORMAL

## 2018-09-14 ENCOUNTER — RADIANT APPOINTMENT (OUTPATIENT)
Dept: GENERAL RADIOLOGY | Facility: CLINIC | Age: 58
End: 2018-09-14
Attending: FAMILY MEDICINE
Payer: COMMERCIAL

## 2018-09-14 ENCOUNTER — OFFICE VISIT (OUTPATIENT)
Dept: FAMILY MEDICINE | Facility: CLINIC | Age: 58
End: 2018-09-14
Payer: COMMERCIAL

## 2018-09-14 VITALS
HEART RATE: 61 BPM | DIASTOLIC BLOOD PRESSURE: 68 MMHG | BODY MASS INDEX: 24.11 KG/M2 | RESPIRATION RATE: 14 BRPM | TEMPERATURE: 98 F | HEIGHT: 62 IN | WEIGHT: 131 LBS | SYSTOLIC BLOOD PRESSURE: 128 MMHG

## 2018-09-14 DIAGNOSIS — R10.84 ABDOMINAL PAIN, GENERALIZED: ICD-10-CM

## 2018-09-14 DIAGNOSIS — K59.00 CONSTIPATION, UNSPECIFIED CONSTIPATION TYPE: ICD-10-CM

## 2018-09-14 DIAGNOSIS — J30.89 CHRONIC ALLERGIC RHINITIS DUE TO OTHER ALLERGIC TRIGGER, UNSPECIFIED SEASONALITY: ICD-10-CM

## 2018-09-14 DIAGNOSIS — M54.2 CERVICALGIA: ICD-10-CM

## 2018-09-14 DIAGNOSIS — F32.0 MILD SINGLE CURRENT EPISODE OF MAJOR DEPRESSIVE DISORDER (H): ICD-10-CM

## 2018-09-14 DIAGNOSIS — R10.84 ABDOMINAL PAIN, GENERALIZED: Primary | ICD-10-CM

## 2018-09-14 LAB
ALBUMIN UR-MCNC: NEGATIVE MG/DL
APPEARANCE UR: CLEAR
BACTERIA #/AREA URNS HPF: ABNORMAL /HPF
BASOPHILS # BLD AUTO: 0 10E9/L (ref 0–0.2)
BASOPHILS NFR BLD AUTO: 0.3 %
BILIRUB UR QL STRIP: NEGATIVE
COLOR UR AUTO: YELLOW
DIFFERENTIAL METHOD BLD: NORMAL
EOSINOPHIL # BLD AUTO: 0.1 10E9/L (ref 0–0.7)
EOSINOPHIL NFR BLD AUTO: 0.7 %
ERYTHROCYTE [DISTWIDTH] IN BLOOD BY AUTOMATED COUNT: 14.6 % (ref 10–15)
GLUCOSE UR STRIP-MCNC: NEGATIVE MG/DL
HCT VFR BLD AUTO: 39.8 % (ref 35–47)
HGB BLD-MCNC: 13.1 G/DL (ref 11.7–15.7)
HGB UR QL STRIP: ABNORMAL
KETONES UR STRIP-MCNC: NEGATIVE MG/DL
LEUKOCYTE ESTERASE UR QL STRIP: ABNORMAL
LYMPHOCYTES # BLD AUTO: 2.8 10E9/L (ref 0.8–5.3)
LYMPHOCYTES NFR BLD AUTO: 37.3 %
MCH RBC QN AUTO: 26.5 PG (ref 26.5–33)
MCHC RBC AUTO-ENTMCNC: 32.9 G/DL (ref 31.5–36.5)
MCV RBC AUTO: 81 FL (ref 78–100)
MONOCYTES # BLD AUTO: 0.8 10E9/L (ref 0–1.3)
MONOCYTES NFR BLD AUTO: 10.6 %
NEUTROPHILS # BLD AUTO: 3.8 10E9/L (ref 1.6–8.3)
NEUTROPHILS NFR BLD AUTO: 51.1 %
NITRATE UR QL: NEGATIVE
NON-SQ EPI CELLS #/AREA URNS LPF: ABNORMAL /LPF
PH UR STRIP: 6.5 PH (ref 5–7)
PLATELET # BLD AUTO: 212 10E9/L (ref 150–450)
RBC # BLD AUTO: 4.94 10E12/L (ref 3.8–5.2)
RBC #/AREA URNS AUTO: ABNORMAL /HPF
RENAL EPI CELLS #/AREA URNS HPF: ABNORMAL /HPF
SOURCE: ABNORMAL
SP GR UR STRIP: 1.02 (ref 1–1.03)
UROBILINOGEN UR STRIP-ACNC: 0.2 EU/DL (ref 0.2–1)
WBC # BLD AUTO: 7.4 10E9/L (ref 4–11)
WBC #/AREA URNS AUTO: ABNORMAL /HPF

## 2018-09-14 PROCEDURE — 84443 ASSAY THYROID STIM HORMONE: CPT | Performed by: FAMILY MEDICINE

## 2018-09-14 PROCEDURE — 36415 COLL VENOUS BLD VENIPUNCTURE: CPT | Performed by: FAMILY MEDICINE

## 2018-09-14 PROCEDURE — 85025 COMPLETE CBC W/AUTO DIFF WBC: CPT | Performed by: FAMILY MEDICINE

## 2018-09-14 PROCEDURE — 99214 OFFICE O/P EST MOD 30 MIN: CPT | Performed by: FAMILY MEDICINE

## 2018-09-14 PROCEDURE — 81001 URINALYSIS AUTO W/SCOPE: CPT | Performed by: FAMILY MEDICINE

## 2018-09-14 PROCEDURE — 74019 RADEX ABDOMEN 2 VIEWS: CPT

## 2018-09-14 RX ORDER — FLUTICASONE PROPIONATE 50 MCG
2 SPRAY, SUSPENSION (ML) NASAL DAILY
Qty: 1 BOTTLE | Refills: 11 | Status: SHIPPED | OUTPATIENT
Start: 2018-09-14 | End: 2019-01-18

## 2018-09-14 RX ORDER — DESVENLAFAXINE 25 MG/1
25 TABLET, EXTENDED RELEASE ORAL DAILY
Qty: 30 TABLET | Refills: 1 | Status: SHIPPED | OUTPATIENT
Start: 2018-09-14 | End: 2018-10-12

## 2018-09-14 ASSESSMENT — PATIENT HEALTH QUESTIONNAIRE - PHQ9
SUM OF ALL RESPONSES TO PHQ QUESTIONS 1-9: 17
SUM OF ALL RESPONSES TO PHQ QUESTIONS 1-9: 17

## 2018-09-14 ASSESSMENT — ANXIETY QUESTIONNAIRES: GAD7 TOTAL SCORE: INCOMPLETE

## 2018-09-14 NOTE — LETTER
September 17, 2018      Mary Carbajal  [address]  [place] [address]        Dear ,    We are writing to inform you of your test results.    Todos los laboritorios son kelly, normal.      Resulted Orders   CBC with platelets and differential   Result Value Ref Range    WBC 7.4 4.0 - 11.0 10e9/L    RBC Count 4.94 3.8 - 5.2 10e12/L    Hemoglobin 13.1 11.7 - 15.7 g/dL    Hematocrit 39.8 35.0 - 47.0 %    MCV 81 78 - 100 fl    MCH 26.5 26.5 - 33.0 pg    MCHC 32.9 31.5 - 36.5 g/dL    RDW 14.6 10.0 - 15.0 %    Platelet Count 212 150 - 450 10e9/L    % Neutrophils 51.1 %    % Lymphocytes 37.3 %    % Monocytes 10.6 %    % Eosinophils 0.7 %    % Basophils 0.3 %    Absolute Neutrophil 3.8 1.6 - 8.3 10e9/L    Absolute Lymphocytes 2.8 0.8 - 5.3 10e9/L    Absolute Monocytes 0.8 0.0 - 1.3 10e9/L    Absolute Eosinophils 0.1 0.0 - 0.7 10e9/L    Absolute Basophils 0.0 0.0 - 0.2 10e9/L    Diff Method Automated Method    *UA reflex to Microscopic and Culture ([place] and [facility] (except [place] and [place])   Result Value Ref Range    Color Urine Yellow     Appearance Urine Clear     Glucose Urine Negative NEG^Negative mg/dL    Bilirubin Urine Negative NEG^Negative    Ketones Urine Negative NEG^Negative mg/dL    Specific Gravity Urine 1.020 1.003 - 1.035    Blood Urine Trace (A) NEG^Negative    pH Urine 6.5 5.0 - 7.0 pH    Protein Albumin Urine Negative NEG^Negative mg/dL    Urobilinogen Urine 0.2 0.2 - 1.0 EU/dL    Nitrite Urine Negative NEG^Negative    Leukocyte Esterase Urine Trace (A) NEG^Negative    Source Midstream Urine    Urine Microscopic   Result Value Ref Range    WBC Urine 0 - 5 OTO5^0 - 5 /HPF    RBC Urine O - 2 OTO2^O - 2 /HPF    Squamous Epithelial /LPF Urine Few FEW^Few /LPF    Bacteria Urine Moderate (A) NEG^Negative /HPF    Renal Tub Fat Cell Few (A) NEG^Negative /HPF   TSH with free T4 reflex   Result Value Ref Range    TSH 0.93 0.40 - 4.00 mU/L       If you have any questions  or concerns, please call the clinic at the number listed above.       Sincerely,        David Bustos MD/AMADEO

## 2018-09-14 NOTE — PROGRESS NOTES
"  SUBJECTIVE:   Mary Carbajal is a 58 year old female who presents to clinic today for the following health issues:    Abdominal pain  For the past 2 weeks has experienced upper abdominal pain, and neck pain that began at the same time. Every time she eats she belches. Accompanied by diarrhea, that was resolved with use of pepto bismal, though stomach pain still present. Eating triggers discomfort. Denies improvement with BM. Sangita have been soft.    NECK pain     Problem list and histories reviewed & adjusted, as indicated.  Additional history: as documented    History reviewed. No pertinent past medical history.    Past Surgical History:   Procedure Laterality Date     HYSTERECTOMY, VAGINAL       LAPAROSCOPIC APPENDECTOMY       Family History   Problem Relation Age of Onset     Diabetes Mother      Diabetes Brother      Diabetes Brother      Diabetes Brother      Social History   Substance Use Topics     Smoking status: Never Smoker     Smokeless tobacco: Never Used     Alcohol use No     Reviewed and updated as needed this visit by clinical staff  Tobacco  Allergies  Meds  Med Hx  Surg Hx  Fam Hx  Soc Hx      Reviewed and updated as needed this visit by Provider       ROS:  Denies dysuria,    This document serves as a record of the services and decisions personally performed and made by David Bustos MD. It was created on his behalf by Surjit Waggoner, a trained medical scribe.  The creation of this document is based on the scribe's personal observations and the provider's statements to the medical scribe.  Surjit Waggoner, September 14, 2018 10:02 AM  OBJECTIVE:   /68 (BP Location: Left arm, Patient Position: Chair, Cuff Size: Adult Regular)  Pulse 61  Temp 98  F (36.7  C) (Oral)  Resp 14  Ht 1.562 m (5' 1.5\")  Wt 59.4 kg (131 lb)  Breastfeeding? No  BMI 24.35 kg/m2  Body mass index is 24.35 kg/(m^2).  GENERAL: Healthy, alert   MS: FROM in neck  ABDOMEN: without organomegaly nor tenderness to " palpation ***    Diagnostic Test Results:  Abdominal XR:     ASSESSMENT/PLAN:   {Diag Picklist:668916}    The information in this document, created by the medical scribe for me, accurately reflects the services I personally performed and the decisions made by me. I have reviewed and approved this document for accuracy prior to leaving the patient care area.  David Bustos MD September 14, 2018 10:03 AM    David Bustos MD  [facility]

## 2018-09-14 NOTE — MR AVS SNAPSHOT
After Visit Summary   9/14/2018    Mary Carbajal    MRN: [id number]           Patient Information     Date Of Birth          1960        Visit Information        Provider Department      9/14/2018 9:30 AM David Bustos MD; [facility]        Today's Diagnoses     Abdominal pain, generalized    -  1    Cervicalgia        Constipation, unspecified constipation type        Mild single current episode of major depressive disorder (H)        Chronic allergic rhinitis due to other allergic trigger, unspecified seasonality           Follow-ups after your visit        Follow-up notes from your care team     Return in about 4 weeks (around 10/12/2018).      Your next 10 appointments already scheduled     Oct 12, 2018  9:45 AM CDT   SHORT with David Bustos MD   [facility] ([facility]    [address] [place] [id number] [phone]              Who to contact     If you have questions or need follow up information about today's clinic visit or your schedule please contact [facility] directly at [phone].  Normal or non-critical lab and imaging results will be communicated to you by MyChart, letter or phone within 4 business days after the clinic has received the results. If you do not hear from us within 7 days, please contact the clinic through MyChart or phone. If you have a critical or abnormal lab result, we will notify you by phone as soon as possible.  Submit refill requests through [employer] or call your pharmacy and they will forward the refill request to us. Please allow 3 business days for your refill to be completed.          Additional Information About Your Visit        Care EveryWhere ID     This is your Care EveryWhere ID. This could be used by other organizations to access your [facility] medical records  [id number]        Your Vitals Were     Pulse Temperature  "Respirations Height Breastfeeding? BMI (Body Mass Index)    61 98  F (36.7  C) (Oral) 14 5' 1.5\" (1.562 m) No 24.35 kg/m2       Blood Pressure from Last 3 Encounters:   09/14/18 128/68   03/23/18 110/68   08/17/17 122/77    Weight from Last 3 Encounters:   09/14/18 131 lb (59.4 kg)   03/23/18 133 lb (60.3 kg)   08/17/17 131 lb 9.6 oz (59.7 kg)              We Performed the Following     *UA reflex to Microscopic and Culture ([place] and [facility] (except [place] and [place])     CBC with platelets and differential     TSH with free T4 reflex     Urine Microscopic          Today's Medication Changes          These changes are accurate as of 9/14/18  3:23 PM.  If you have any questions, ask your nurse or doctor.               Start taking these medicines.        Dose/Directions    desvenlafaxine succinate 25 MG 24 hr tablet   Commonly known as:  PRISTIQ   Used for:  Mild single current episode of major depressive disorder (H)   Started by:  David Bustos MD        Dose:  25 mg   Take 1 tablet (25 mg) by mouth daily   Quantity:  30 tablet   Refills:  1       nabumetone 750 MG tablet   Commonly known as:  RELAFEN   Used for:  Cervicalgia   Started by:  David Bustos MD        Dose:  750 mg   Take 1 tablet (750 mg) by mouth 2 times daily   Quantity:  60 tablet   Refills:  1            Where to get your medicines      These medications were sent to [address]  [id number] [place] [address]     Phone:  [phone]     desvenlafaxine succinate 25 MG 24 hr tablet    fluticasone 50 MCG/ACT spray    nabumetone 750 MG tablet                Primary Care Provider Office Phone # Fax #    Dvaid Bustos [phone] [address] [facility] [address]        Equal Access to Services     SERG COLLINS AH: Lobo munozo Sorosalino, waaxda luqadaha, qaybta kaalmada adeegyada, waxay inez campbell . So [facility] " [phone].    ATENCIÓN: Si daksha glynn, tiene a weldon disposición servicios gratuitos de asistencia lingüística. Claudia gregorio [phone].    We comply with applicable federal civil rights laws and [place] laws. We do not discriminate on the basis of race, color, national origin, age, disability, sex, sexual orientation, or gender identity.            Thank you!     Thank you for choosing [facility]  for your care. Our goal is always to provide you with excellent care. Hearing back from our patients is one way we can continue to improve our services. Please take a few minutes to complete the written survey that you may receive in the mail after your visit with us. Thank you!             Your Updated Medication List - Protect others around you: Learn how to safely use, store and throw away your medicines at www.disposemymeds.org.          This list is accurate as of 9/14/18  3:23 PM.  Always use your most recent med list.                   Brand Name Dispense Instructions for use Diagnosis    desvenlafaxine succinate 25 MG 24 hr tablet    PRISTIQ    30 tablet    Take 1 tablet (25 mg) by mouth daily    Mild single current episode of major depressive disorder (H)       fluticasone 50 MCG/ACT spray    FLONASE    1 Bottle    Spray 2 sprays into both nostrils daily    Chronic allergic rhinitis due to other allergic trigger, unspecified seasonality       nabumetone 750 MG tablet    RELAFEN    60 tablet    Take 1 tablet (750 mg) by mouth 2 times daily    Cervicalgia

## 2018-09-14 NOTE — PROGRESS NOTES
SUBJECTIVE:   Mayr Carbajal is a 58 year old female who presents to clinic today for the following health issues:  Answers for HPI/ROS submitted by the patient on 9/14/2018   PHQ9 TOTAL SCORE: 17  VIKTORIA 7 TOTAL SCORE: Incomplete      Depression   For the past 2 months she has been experiencing reduced motivation to do things. Has made sure to continue going to the gym 3-4 x weekly to weight lift and perform cardio exercises. When she gets home from the gym she is only interested in laying on the couch.  States that she has never previously been depressed.     For the past 2 weeks has experienced epigastric abdominal pain, and neck pain that began at the same time. Every time she eats she belches. Accompanied by episode of diarrhea, that was resolved with use of pepto bismal, though stomach pain still present. Eating triggers discomfort. Denies improvement with BM. Sangita have been soft.  No hematochezia      Chronic allergic rhinitis due to other allergic trigger, unspecified seasonality Flonase effective requests refills           Problem list and histories reviewed & adjusted, as indicated.  Additional history: as documented    Past Medical History:   Diagnosis Date     CARDIOVASCULAR SCREENING; LDL GOAL LESS THAN 160 10/31/2010     Cervicalgia 9/14/2018     Chronic allergic rhinitis due to other allergic trigger, unspecified seasonality 9/14/2018     Constipation, unspecified constipation type 9/14/2018     Family history of diabetes mellitus 10/28/2013     GERD (gastroesophageal reflux disease) 3/17/2011     Mild single current episode of major depressive disorder (H) 9/14/2018     Vaginal atrophy 10/8/2015         Past Surgical History:   Procedure Laterality Date     HYSTERECTOMY, VAGINAL       LAPAROSCOPIC APPENDECTOMY       Family History   Problem Relation Age of Onset     Diabetes Mother      Diabetes Brother      Diabetes Brother      Diabetes Brother      Social History   Substance Use Topics      "Smoking status: Never Smoker     Smokeless tobacco: Never Used     Alcohol use No     Reviewed and updated as needed this visit by clinical staff  Tobacco  Allergies  Meds  Med Hx  Surg Hx  Fam Hx  Soc Hx      Reviewed and updated as needed this visit by Provider     ROS:  Denies dysuria; positive coryza no fevers    This document serves as a record of the services and decisions personally performed and made by David Bustos MD. It was created on his behalf by Surjit Waggoner, a trained medical scribe.  The creation of this document is based on the scribe's personal observations and the provider's statements to the medical scribe.  Surjit Waggoner, September 14, 2018 10:02 AM  OBJECTIVE:   /68 (BP Location: Left arm, Patient Position: Chair, Cuff Size: Adult Regular)  Pulse 61  Temp 98  F (36.7  C) (Oral)  Resp 14  Ht 1.562 m (5' 1.5\")  Wt 59.4 kg (131 lb)  Breastfeeding? No  BMI 24.35 kg/m2  Body mass index is 24.35 kg/(m^2).  GENERAL: Healthy, alert   MS: Neck full range of motion  ABDOMEN: without organomegaly nor tenderness to palpation     Diagnostic Test Results:  Abdominal XR: Stool noted in all 4 quadrants   ASSESSMENT/PLAN:   ASSESSMENT / PLAN:  (R10.84) Abdominal pain, generalized  (primary encounter diagnosis)  Comment: Colonoscopy up-to-date  Plan: CBC with platelets and differential, *UA reflex        to Microscopic and Culture ([place] and [facility] (except [place] and [place]), XR         Abdomen 2 Views, Urine Microscopic       Postulate IBS given association of cervicalgia and depression    (M54.2) Cervicalgia  Comment: We can treat this is musculoskeletal  Plan: nabumetone (RELAFEN) 750 MG tablet        May however respond to medication below    (K59.00) Constipation, unspecified constipation type  Comment: IBS seems likely  Plan: TSH with free T4 reflex      Broaden data base      (F32.0) Mild single current episode of major depressive disorder (H)  Comment: Discussed " treatment options  Plan: desvenlafaxine succinate (PRISTIQ) 25 MG 24 hr         tablet        She declines therapy    (J30.89) Chronic allergic rhinitis due to other allergic trigger, unspecified seasonality  Comment: She has refills  Plan: fluticasone (FLONASE) 50 MCG/ACT spray        Prescribe a new          David Bustos MD      The information in this document, created by the medical scribe for me, accurately reflects the services I personally performed and the decisions made by me. I have reviewed and approved this document for accuracy prior to leaving the patient care area.  David Bustos MD September 14, 2018 10:03 AM    David Bustos MD  [facility]

## 2018-09-15 LAB — TSH SERPL DL<=0.005 MIU/L-ACNC: 0.93 MU/L (ref 0.4–4)

## 2018-09-15 ASSESSMENT — PATIENT HEALTH QUESTIONNAIRE - PHQ9: SUM OF ALL RESPONSES TO PHQ QUESTIONS 1-9: 17

## 2018-10-12 ENCOUNTER — OFFICE VISIT (OUTPATIENT)
Dept: FAMILY MEDICINE | Facility: CLINIC | Age: 58
End: 2018-10-12
Payer: COMMERCIAL

## 2018-10-12 VITALS
BODY MASS INDEX: 24.66 KG/M2 | TEMPERATURE: 98.4 F | HEART RATE: 75 BPM | SYSTOLIC BLOOD PRESSURE: 117 MMHG | DIASTOLIC BLOOD PRESSURE: 74 MMHG | WEIGHT: 134 LBS | HEIGHT: 62 IN | RESPIRATION RATE: 16 BRPM

## 2018-10-12 DIAGNOSIS — Z23 NEED FOR PROPHYLACTIC VACCINATION AND INOCULATION AGAINST INFLUENZA: ICD-10-CM

## 2018-10-12 DIAGNOSIS — R10.84 ABDOMINAL PAIN, GENERALIZED: Primary | ICD-10-CM

## 2018-10-12 DIAGNOSIS — K59.00 CONSTIPATION, UNSPECIFIED CONSTIPATION TYPE: ICD-10-CM

## 2018-10-12 DIAGNOSIS — M54.2 CERVICALGIA: ICD-10-CM

## 2018-10-12 DIAGNOSIS — L98.8 SKIN MACULE: ICD-10-CM

## 2018-10-12 DIAGNOSIS — F32.0 MILD SINGLE CURRENT EPISODE OF MAJOR DEPRESSIVE DISORDER (H): ICD-10-CM

## 2018-10-12 DIAGNOSIS — G47.00 INSOMNIA, UNSPECIFIED TYPE: ICD-10-CM

## 2018-10-12 PROCEDURE — 99214 OFFICE O/P EST MOD 30 MIN: CPT | Mod: 25 | Performed by: FAMILY MEDICINE

## 2018-10-12 PROCEDURE — 90471 IMMUNIZATION ADMIN: CPT | Performed by: FAMILY MEDICINE

## 2018-10-12 PROCEDURE — 90682 RIV4 VACC RECOMBINANT DNA IM: CPT | Performed by: FAMILY MEDICINE

## 2018-10-12 RX ORDER — DESVENLAFAXINE 50 MG/1
50 TABLET, FILM COATED, EXTENDED RELEASE ORAL DAILY
Qty: 30 TABLET | Refills: 2 | Status: SHIPPED | OUTPATIENT
Start: 2018-10-12 | End: 2019-01-18

## 2018-10-12 NOTE — PROGRESS NOTES
SUBJECTIVE:   Mary Carbajal is a 58 year old female who presents to clinic today for the following health issues:      Follow up from 9/14/18 Abdominal pain, As been feeling better.    Pt as been having a hard time falling asleep x 2 weeks     Arms and legs have white spots on the skin x 2 weeks     Abdominal pain, generalized  (primary encounter diagnosis) resolved  Mild single current episode of major depressive disorder (H) improved  Cervicalgia resolved  Constipation, unspecified constipation type resolved  Need for prophylactic vaccination and inoculation against influenza offered  Insomnia, unspecified type he has trouble falling asleep tosses and turns.  No daytime fatigue duration unclear  Skin macules 2-4 mm hypopigmented macules have appeared on her forearms and right anterior thigh.  They do not itch she thinks is been 2 weeks      Problem list and histories reviewed & adjusted, as indicated.  Additional history: none    Past Medical History:   Diagnosis Date     CARDIOVASCULAR SCREENING; LDL GOAL LESS THAN 160 10/31/2010     Cervicalgia 9/14/2018     Chronic allergic rhinitis due to other allergic trigger, unspecified seasonality 9/14/2018     Constipation, unspecified constipation type 9/14/2018     Family history of diabetes mellitus 10/28/2013     GERD (gastroesophageal reflux disease) 3/17/2011     Insomnia, unspecified type 10/12/2018     Mild single current episode of major depressive disorder (H) 9/14/2018     Skin macule 10/12/2018     Vaginal atrophy 10/8/2015       Past Surgical History:   Procedure Laterality Date     HYSTERECTOMY, VAGINAL       LAPAROSCOPIC APPENDECTOMY         Family History   Problem Relation Age of Onset     Diabetes Mother      Diabetes Brother      Diabetes Brother      Diabetes Brother        Social History   Substance Use Topics     Smoking status: Never Smoker     Smokeless tobacco: Never Used     Alcohol use No         Reviewed and updated as needed this visit by  "clinical staff  Tobacco  Allergies  Meds  Med Hx  Surg Hx  Fam Hx  Soc Hx      Reviewed and updated as needed this visit by Provider         ROS no systemic symptoms, xerostomia  Injectable Influenza Immunization Documentation             Injectable Influenza Immunization Documentation    1.  Is the person to be vaccinated sick today?   No    2. Does the person to be vaccinated have an allergy to a component   of the vaccine?   No  Egg Allergy Algorithm Link    3. Has the person to be vaccinated ever had a serious reaction   to influenza vaccine in the past?   No    4. Has the person to be vaccinated ever had Guillain-Barré syndrome?   No    Form completed by Katy Owens RMA    /74  Pulse 75  Temp 98.4  F (36.9  C) (Oral)  Resp 16  Ht 5' 1.5\" (1.562 m)  Wt 134 lb (60.8 kg)  Breastfeeding? No  BMI 24.91 kg/m2    Macules are as described.  2-3 mm, hypopigmented.  The proximal one on the right forearm has what appears to be a verrucous center     ASSESSMENT / PLAN:  (R10.84) Abdominal pain, generalized  (primary encounter diagnosis)  Comment: Improved, consistent with IBS  Plan: desvenlafaxine succinate (PRISTIQ) 50 MG 24 hr         tablet        increase    (F32.0) Mild single current episode of major depressive disorder (H)  Comment: inproved  Plan: desvenlafaxine succinate (PRISTIQ) 50 MG 24 hr         tablet        attempt complete remission    (M54.2) Cervicalgia  Comment: imroved  Plan: desvenlafaxine succinate (PRISTIQ) 50 MG 24 hr         tablet        increase    (K59.00) Constipation, unspecified constipation type  Comment: resolved  Plan: Consistent with IBS    (Z23) Need for prophylactic vaccination and inoculation against influenza  Comment: Will accept     Plan: FLU VACCINE, (RIV4) RECOMBINANT HA  , IM         (FluBlok, egg free) [21394]- >18 YRS (FMG         recommended  50-64 YRS), Vaccine         Administration, Initial [68194]            (G47.00) Insomnia, unspecified " type  Comment: Uncertain duration.  May be medicine related  Plan: Sleep hygiene observe I suspect these are flat warts and    (L98.8) Skin macule  Comment: Of longer duration than she has noticed, perhaps becoming more evident with sun exposure  Plan: Monitor*          David Bustos MD

## 2018-10-12 NOTE — MR AVS SNAPSHOT
"              After Visit Summary   10/12/2018    Mary Carbajal    MRN: [id number]           Patient Information     Date Of Birth          1960        Visit Information        Provider Department      10/12/2018 9:45 AM David Bustos MD; [employer] [facility]        Today's Diagnoses     Need for prophylactic vaccination and inoculation against influenza    -  1    Abdominal pain, generalized        Mild single current episode of major depressive disorder (H)        Cervicalgia        Constipation, unspecified constipation type        Insomnia, unspecified type        Skin macule           Follow-ups after your visit        Follow-up notes from your care team     Return in about 11 weeks (around 12/28/2018).      Who to contact     If you have questions or need follow up information about today's clinic visit or your schedule please contact [facility] directly at [phone].  Normal or non-critical lab and imaging results will be communicated to you by MyChart, letter or phone within 4 business days after the clinic has received the results. If you do not hear from us within 7 days, please contact the clinic through MyChart or phone. If you have a critical or abnormal lab result, we will notify you by phone as soon as possible.  Submit refill requests through [employer] or call your pharmacy and they will forward the refill request to us. Please allow 3 business days for your refill to be completed.          Additional Information About Your Visit        Care EveryWhere ID     This is your Care EveryWhere ID. This could be used by other organizations to access your [place] medical records  [id number]        Your Vitals Were     Pulse Temperature Respirations Height Breastfeeding? BMI (Body Mass Index)    75 98.4  F (36.9  C) (Oral) 16 5' 1.5\" (1.562 m) No 24.91 kg/m2       Blood Pressure from Last 3 Encounters:   10/12/18 117/74   09/14/18 128/68 "   03/23/18 110/68    Weight from Last 3 Encounters:   10/12/18 134 lb (60.8 kg)   09/14/18 131 lb (59.4 kg)   03/23/18 133 lb (60.3 kg)              We Performed the Following     FLU VACCINE, (RIV4) RECOMBINANT HA  , IM (FluBlok, egg free) [09715]- >18 YRS (FMG recommended  50-64 YRS)     Vaccine Administration, Initial [29812]          Today's Medication Changes          These changes are accurate as of 10/12/18 10:28 AM.  If you have any questions, ask your nurse or doctor.               These medicines have changed or have updated prescriptions.        Dose/Directions    desvenlafaxine succinate 50 MG 24 hr tablet   Commonly known as:  PRISTIQ   This may have changed:    - medication strength  - how much to take   Used for:  Mild single current episode of major depressive disorder (H), Abdominal pain, generalized, Cervicalgia   Changed by:  David Bustos MD        Dose:  50 mg   Take 1 tablet (50 mg) by mouth daily   Quantity:  30 tablet   Refills:  2            Where to get your medicines      These medications were sent to [facility] [address]  [id number] [place], [place] [id number]     Phone:  [phone]     desvenlafaxine succinate 50 MG 24 hr tablet                Primary Care Provider Office Phone # Fax #    David Bustos [phone] [address] [facility] [address]        Equal Access to Services     [facility] AH: Hadii matias gatica hadmerylo Sorosalino, waaxda luqadaha, qaybta kaalmada adeegyada, jamaica ledbetter. So [facility] [phone].    ATENCIÓN: Si habla español, tiene a weldon disposición servicios gratuitos de asistencia lingüística. Llame al [phone].    We comply with applicable federal civil rights laws and [place] laws. We do not discriminate on the basis of race, color, national origin, age, disability, sex, sexual orientation, or gender identity.            Thank you!     Thank you for choosing  [facility]  for your care. Our goal is always to provide you with excellent care. Hearing back from our patients is one way we can continue to improve our services. Please take a few minutes to complete the written survey that you may receive in the mail after your visit with us. Thank you!             Your Updated Medication List - Protect others around you: Learn how to safely use, store and throw away your medicines at www.disposemymeds.org.          This list is accurate as of 10/12/18 10:28 AM.  Always use your most recent med list.                   Brand Name Dispense Instructions for use Diagnosis    desvenlafaxine succinate 50 MG 24 hr tablet    PRISTIQ    30 tablet    Take 1 tablet (50 mg) by mouth daily    Mild single current episode of major depressive disorder (H), Abdominal pain, generalized, Cervicalgia       fluticasone 50 MCG/ACT spray    FLONASE    1 Bottle    Spray 2 sprays into both nostrils daily    Chronic allergic rhinitis due to other allergic trigger, unspecified seasonality

## 2018-12-03 NOTE — MR AVS SNAPSHOT
After Visit Summary   3/23/2018    Mary Carbajal    MRN: [id number]           Patient Information     Date Of Birth          1960        Visit Information        Provider Department      3/23/2018 8:40 AM Arielle Winkler DO; [employer] [facility]        Today's Diagnoses     Routine history and physical examination of adult    -  1    Encounter for Papanicolaou smear for cervical cancer screening        Chronic allergic rhinitis due to other allergic trigger, unspecified seasonality        Dysthymia          Care Instructions      Preventive Health Recommendations  Female Ages 50 - 64    Yearly exam: See your health care provider every year in order to  o Review health changes.   o Discuss preventive care.    o Review your medicines if your doctor has prescribed any.      Get a Pap test every three years (unless you have an abnormal result and your provider advises testing more often).    If you get Pap tests with HPV test, you only need to test every 5 years, unless you have an abnormal result.     You do not need a Pap test if your uterus was removed (hysterectomy) and you have not had cancer.    You should be tested each year for STDs (sexually transmitted diseases) if you're at risk.     Have a mammogram every 1 to 2 years.    Have a colonoscopy at age 50, or have a yearly FIT test (stool test). These exams screen for colon cancer.      Have a cholesterol test every 5 years, or more often if advised.    Have a diabetes test (fasting glucose) every three years. If you are at risk for diabetes, you should have this test more often.     If you are at risk for osteoporosis (brittle bone disease), think about having a bone density scan (DEXA).    Shots: Get a flu shot each year. Get a tetanus shot every 10 years.    Nutrition:     Eat at least 5 servings of fruits and vegetables each day.    Eat whole-grain bread, whole-wheat pasta and brown rice instead  "of white grains and rice.    Talk to your provider about Calcium and Vitamin D.     Lifestyle    Exercise at least 150 minutes a week (30 minutes a day, 5 days a week). This will help you control your weight and prevent disease.    Limit alcohol to one drink per day.    No smoking.     Wear sunscreen to prevent skin cancer.     See your dentist every six months for an exam and cleaning.    See your eye doctor every 1 to 2 years.            Follow-ups after your visit        Who to contact     If you have questions or need follow up information about today's clinic visit or your schedule please contact [facility] directly at [phone].  Normal or non-critical lab and imaging results will be communicated to you by MyChart, letter or phone within 4 business days after the clinic has received the results. If you do not hear from us within 7 days, please contact the clinic through [employer] or phone. If you have a critical or abnormal lab result, we will notify you by phone as soon as possible.  Submit refill requests through [employer] or call your pharmacy and they will forward the refill request to us. Please allow 3 business days for your refill to be completed.          Additional Information About Your Visit        [employer] Information     [employer] lets you send messages to your doctor, view your test results, renew your prescriptions, schedule appointments and more. To sign up, go to [email or web address] . Click on \"Log in\" on the left side of the screen, which will take you to the Welcome page. Then click on \"Sign up Now\" on the right side of the page.     You will be asked to enter the access code listed below, as well as some personal information. Please follow the directions to create your username and password.     Your access code is: [email or web address]  Expires: 2018  8:57 AM     Your access code will  in 90 days. If you need help or a new code, please call your [facility] or " "[phone].        Care EveryWhere ID     This is your Care EveryWhere ID. This could be used by other organizations to access your [place] medical records  [id number]        Your Vitals Were     Pulse Temperature Respirations Height Pulse Oximetry BMI (Body Mass Index)    56 97.8  F (36.6  C) (Oral) 12 5' 1.5\" (1.562 m) 99% 24.72 kg/m2       Blood Pressure from Last 3 Encounters:   03/23/18 110/68   08/17/17 122/77   02/10/17 110/72    Weight from Last 3 Encounters:   03/23/18 133 lb (60.3 kg)   08/17/17 131 lb 9.6 oz (59.7 kg)   02/10/17 135 lb (61.2 kg)              We Performed the Following     CBC with platelets differential     Comprehensive metabolic panel     HPV High Risk Types DNA Cervical     Lipid panel reflex to direct LDL Fasting     Pap imaged thin layer screen with HPV - recommended age 30 - 65 years (select HPV order below)          Today's Medication Changes          These changes are accurate as of 3/23/18  9:14 AM.  If you have any questions, ask your nurse or doctor.               Stop taking these medicines if you haven't already. Please contact your care team if you have questions.     ibuprofen 800 MG tablet   Commonly known as:  ADVIL/MOTRIN   Stopped by:  Arielle Winkler DO           venlafaxine 150 MG 24 hr capsule   Commonly known as:  EFFEXOR-XR   Stopped by:  Arielle Winkler DO           venlafaxine 37.5 MG 24 hr capsule   Commonly known as:  EFFEXOR-XR   Stopped by:  Arielle Winkler DO                Where to get your medicines      These medications were sent to [facility] [place], [place] - [address]  [address] [facility] [address]     Phone:  [phone]     fluticasone 50 MCG/ACT spray                Primary Care Provider Office Phone # Fax #    David Bustos -[phone] [address] [facility] [address]        Equal Access to Services     [facility] AH: minesh Mixon " shelley reidmamilton escaleradomenicchiqui rosebina susanin hayaan adeeg kharash la'aan ah. So [facility] [phone].    ATENCIÓN: Si daksha glynn, tiene a weldon disposición servicios gratuitos de asistencia lingüística. Shubhamame al [phone].    We comply with applicable federal civil rights laws and [place] laws. We do not discriminate on the basis of race, color, national origin, age, disability, sex, sexual orientation, or gender identity.            Thank you!     Thank you for choosing [facility]  for your care. Our goal is always to provide you with excellent care. Hearing back from our patients is one way we can continue to improve our services. Please take a few minutes to complete the written survey that you may receive in the mail after your visit with us. Thank you!             Your Updated Medication List - Protect others around you: Learn how to safely use, store and throw away your medicines at www.disposemymeds.org.          This list is accurate as of 3/23/18  9:14 AM.  Always use your most recent med list.                   Brand Name Dispense Instructions for use Diagnosis    fluticasone 50 MCG/ACT spray    FLONASE    1 Bottle    Spray 2 sprays into both nostrils daily    Chronic allergic rhinitis due to other allergic trigger, unspecified seasonality          Bilobed Transposition Flap Text: The defect edges were debeveled with a #15 scalpel blade.  Given the location of the defect and the proximity to free margins a bilobed transposition flap was deemed most appropriate.  Using a sterile surgical marker, an appropriate bilobe flap drawn around the defect.    The area thus outlined was incised deep to adipose tissue with a #15 scalpel blade.  The skin margins were undermined to an appropriate distance in all directions utilizing iris scissors.

## 2019-01-18 ENCOUNTER — OFFICE VISIT (OUTPATIENT)
Dept: FAMILY MEDICINE | Facility: CLINIC | Age: 59
End: 2019-01-18
Payer: COMMERCIAL

## 2019-01-18 ENCOUNTER — ANCILLARY PROCEDURE (OUTPATIENT)
Dept: GENERAL RADIOLOGY | Facility: CLINIC | Age: 59
End: 2019-01-18
Payer: COMMERCIAL

## 2019-01-18 VITALS
BODY MASS INDEX: 24.09 KG/M2 | DIASTOLIC BLOOD PRESSURE: 84 MMHG | WEIGHT: 130.9 LBS | SYSTOLIC BLOOD PRESSURE: 122 MMHG | HEART RATE: 81 BPM | OXYGEN SATURATION: 98 % | RESPIRATION RATE: 20 BRPM | TEMPERATURE: 98 F | HEIGHT: 62 IN

## 2019-01-18 DIAGNOSIS — M25.561 RIGHT KNEE PAIN, UNSPECIFIED CHRONICITY: ICD-10-CM

## 2019-01-18 DIAGNOSIS — M25.562 LEFT KNEE PAIN, UNSPECIFIED CHRONICITY: Primary | ICD-10-CM

## 2019-01-18 DIAGNOSIS — Z87.39 HISTORY OF NECK PAIN: ICD-10-CM

## 2019-01-18 DIAGNOSIS — J00 CORYZA: ICD-10-CM

## 2019-01-18 DIAGNOSIS — M17.0 PRIMARY OSTEOARTHRITIS OF BOTH KNEES: ICD-10-CM

## 2019-01-18 DIAGNOSIS — Z87.898 HX OF ABDOMINAL PAIN: ICD-10-CM

## 2019-01-18 PROCEDURE — 73565 X-RAY EXAM OF KNEES: CPT

## 2019-01-18 PROCEDURE — 99214 OFFICE O/P EST MOD 30 MIN: CPT | Performed by: FAMILY MEDICINE

## 2019-01-18 RX ORDER — FLUTICASONE PROPIONATE 50 MCG
2 SPRAY, SUSPENSION (ML) NASAL DAILY
Qty: 1 BOTTLE | Refills: 11 | Status: SHIPPED | OUTPATIENT
Start: 2019-01-18 | End: 2019-02-15

## 2019-01-18 ASSESSMENT — PATIENT HEALTH QUESTIONNAIRE - PHQ9
10. IF YOU CHECKED OFF ANY PROBLEMS, HOW DIFFICULT HAVE THESE PROBLEMS MADE IT FOR YOU TO DO YOUR WORK, TAKE CARE OF THINGS AT HOME, OR GET ALONG WITH OTHER PEOPLE: SOMEWHAT DIFFICULT
SUM OF ALL RESPONSES TO PHQ QUESTIONS 1-9: 3
SUM OF ALL RESPONSES TO PHQ QUESTIONS 1-9: 3

## 2019-01-18 ASSESSMENT — ANXIETY QUESTIONNAIRES
1. FEELING NERVOUS, ANXIOUS, OR ON EDGE: NOT AT ALL
GAD7 TOTAL SCORE: 1
3. WORRYING TOO MUCH ABOUT DIFFERENT THINGS: NOT AT ALL
7. FEELING AFRAID AS IF SOMETHING AWFUL MIGHT HAPPEN: NOT AT ALL
5. BEING SO RESTLESS THAT IT IS HARD TO SIT STILL: SEVERAL DAYS
6. BECOMING EASILY ANNOYED OR IRRITABLE: NOT AT ALL
2. NOT BEING ABLE TO STOP OR CONTROL WORRYING: NOT AT ALL
7. FEELING AFRAID AS IF SOMETHING AWFUL MIGHT HAPPEN: NOT AT ALL
GAD7 TOTAL SCORE: 1
GAD7 TOTAL SCORE: 1
4. TROUBLE RELAXING: NOT AT ALL

## 2019-01-18 ASSESSMENT — MIFFLIN-ST. JEOR: SCORE: 1137.01

## 2019-01-18 NOTE — PROGRESS NOTES
SUBJECTIVE:   Mary Carbajal is a 58 year old female who presents to clinic today for the following health issues:  Answers for HPI/ROS submitted by the patient on 1/18/2019   If you checked off any problems, how difficult have these problems made it for you to do your work, take care of things at home, or get along with other people?: Somewhat difficult  PHQ9 TOTAL SCORE: 3  VIKTORIA 7 TOTAL SCORE: 1    HPI  Back Pain       Duration:         Specific cause:     Description:   Location of pain:   Character of pain:   Pain radiation:  New numbness or weakness in legs, not attributed to pain:      Intensity:     History:   Pain interferes with job:   History of back problems:   Any previous MRI or X-rays:   Sees a specialist for back pain:    Therapies tried without relief:     Alleviating factors:   Improved by:       Precipitating factors:  Worsened by:         Accompanying Signs & Symptoms:  Risk of Fracture:    Risk of Cauda Equina:    Risk of Infection:    Risk of Cancer:    Risk of Ankylosing Spondylitis:  Onset at age <35, male, AND morning back stiffness.                Problem list and histories reviewed & adjusted, as indicated.  Additional history:     Left knee pain, unspecified chronicity  (primary encounter diagnosis)  Right knee pain, unspecified chronicity when arising from full flexion  Primary osteoarthritis of both knees postulated  Coryza at moment  Hx of abdominal pain thought to be IBS, resolved after 1 mo SNRI 1 mos cathartics  History of neck pain resolved after 1 mo SNRI    Past Medical History:   Diagnosis Date     CARDIOVASCULAR SCREENING; LDL GOAL LESS THAN 160 10/31/2010     Cervicalgia 9/14/2018     Chronic allergic rhinitis due to other allergic trigger, unspecified seasonality 9/14/2018     Constipation, unspecified constipation type 9/14/2018     Family history of diabetes mellitus 10/28/2013     GERD (gastroesophageal reflux disease) 3/17/2011     History of neck pain 1/19/2019     Hx  "of abdominal pain 1/19/2019     Insomnia, unspecified type 10/12/2018     Mild single current episode of major depressive disorder (H) 9/14/2018     Skin macule 10/12/2018     Vaginal atrophy 10/8/2015       Past Surgical History:   Procedure Laterality Date     HYSTERECTOMY, VAGINAL       LAPAROSCOPIC APPENDECTOMY         Family History   Problem Relation Age of Onset     Diabetes Mother      Diabetes Brother      Colorectal Cancer Father      Diabetes Brother      Diabetes Brother        Social History     Tobacco Use     Smoking status: Never Smoker     Smokeless tobacco: Never Used   Substance Use Topics     Alcohol use: No     Alcohol/week: 0.0 oz       ROS no cough fever ST. Coryza approx 1 week    /84 (BP Location: Right arm, Patient Position: Chair, Cuff Size: Adult Regular)   Pulse 81   Temp 98  F (36.7  C) (Oral)   Resp 20   Ht 1.575 m (5' 2\")   Wt 59.4 kg (130 lb 14.4 oz)   LMP  (LMP Unknown)   SpO2 98%   Breastfeeding? No   BMI 23.94 kg/m    Mucoid rhinitis  Knees w/o effusion instability patellar findings pain    XR neg    ASSESSMENT / PLAN:  (M25.562) Left knee pain, unspecified chronicity  (primary encounter diagnosis)  (M25.561) Right knee pain, unspecified chronicity  Comment: suspect early OA  Plan: XR Knee AP Standing Bilateral      discussed          (M17.0) Primary osteoarthritis of both knees  Comment: nascent  Plan: diclofenac (VOLTAREN) 1 % topical gel        Discussed options. She decides upon    (J34.89) Coryza  Comment: recommend  Plan: fluticasone (FLONASE) 50 MCG/ACT nasal spray        Allergy vs vasomotor vs viral    (Z87.898) Hx of abdominal pain  Comment: IBS resolved  Plan: data base adequate    (Z87.39) History of neck pain  Comment: resolved  Plan: discussed          David Bustos MD          "

## 2019-01-18 NOTE — PROCEDURES
SUBJECTIVE:   Mary Carbajal is a 58 year old female who presents to clinic today for the following health issues:  }    Back Pain       Duration: 7 years         Specific cause: none    Description:   Location of pain: low back bilateral and   Character of pain: dull ache  Pain radiation: both legs   New numbness or weakness in legs, not attributed to pain:  no     Intensity: Currently 5/10, moderate    History:   Pain interferes with job: No,   History of back problems: No   Any previous MRI or X-rays: Yes- at [place]. (stomach)  Date 12/2018  Sees a specialist for back pain:  No  Therapies tried without relief: none    Alleviating factors:   Improved by: activity      Precipitating factors:  Worsened by: Nothing          Accompanying Signs & Symptoms:  Risk of Fracture:    Risk of Cauda Equina:    Risk of Infection:    Risk of Cancer:    Risk of Ankylosing Spondylitis:  Onset at age <35, male, AND morning back stiffness.                      Problem list and histories reviewed & adjusted, as indicated.  Additional history:         Reviewed and updated as needed this visit by clinical staff  Tobacco  Allergies  Meds  Med Hx  Surg Hx  Fam Hx  Soc Hx      Reviewed and updated as needed this visit by Provider

## 2019-01-19 PROBLEM — Z87.898: Status: ACTIVE | Noted: 2019-01-19

## 2019-01-19 PROBLEM — Z87.39 HISTORY OF NECK PAIN: Status: RESOLVED | Noted: 2019-01-19 | Resolved: 2019-01-19

## 2019-01-19 PROBLEM — Z87.898: Status: RESOLVED | Noted: 2019-01-19 | Resolved: 2019-01-19

## 2019-01-19 PROBLEM — Z87.39 HISTORY OF NECK PAIN: Status: ACTIVE | Noted: 2019-01-19

## 2019-01-19 ASSESSMENT — PATIENT HEALTH QUESTIONNAIRE - PHQ9: SUM OF ALL RESPONSES TO PHQ QUESTIONS 1-9: 3

## 2019-01-19 ASSESSMENT — ANXIETY QUESTIONNAIRES: GAD7 TOTAL SCORE: 1

## 2019-02-15 ENCOUNTER — OFFICE VISIT (OUTPATIENT)
Dept: FAMILY MEDICINE | Facility: CLINIC | Age: 59
End: 2019-02-15
Payer: COMMERCIAL

## 2019-02-15 VITALS
RESPIRATION RATE: 22 BRPM | DIASTOLIC BLOOD PRESSURE: 62 MMHG | HEIGHT: 62 IN | TEMPERATURE: 97.6 F | SYSTOLIC BLOOD PRESSURE: 110 MMHG | HEART RATE: 68 BPM | WEIGHT: 131 LBS | BODY MASS INDEX: 24.11 KG/M2 | OXYGEN SATURATION: 100 %

## 2019-02-15 DIAGNOSIS — Z80.0 FAMILY HISTORY OF COLON CANCER: ICD-10-CM

## 2019-02-15 DIAGNOSIS — R10.13 ABDOMINAL PAIN, EPIGASTRIC: Primary | ICD-10-CM

## 2019-02-15 PROCEDURE — 99214 OFFICE O/P EST MOD 30 MIN: CPT | Performed by: FAMILY MEDICINE

## 2019-02-15 ASSESSMENT — MIFFLIN-ST. JEOR: SCORE: 1132.46

## 2019-02-15 NOTE — PROGRESS NOTES
SUBJECTIVE:   Mary Carbajal is a 59 year old female who presents to clinic today for the following health issues:    ABDOMINAL   PAIN     Onset: x2 weeks actually many years    Description:   Character: dull ache  Location: middle, upper chest  Radiation: None    Intensity: severe    Progression of Symptoms:  same    Accompanying Signs & Symptoms:  Fever/Chills?: no   Gas/Bloating: YES  Nausea: Yes  Vomitting: no   Diarrhea?: no   Constipation:no   Dysuria or Hematuria: no    History:   Trauma: no   Previous similar pain: Yes    Previous tests done: Upper Endoscopy    Precipitating factors:   Does the pain change with:     Food: YES     BM: no     Urination: no     Alleviating factors:  Milanta    Therapies Tried and outcome: OTC's, nothing helps    LMP:  NA         Post prandial epigastric discomfort of longstanding   remote evaluation unrevealing  Previous trial of SNRI increased symptoms stopped by patient.   She complains of excess burping.  After eating her stomach feels bloated.        Problem list and histories reviewed & adjusted, as indicated.  Additional history: as documented    Past Medical History:   Diagnosis Date     CARDIOVASCULAR SCREENING; LDL GOAL LESS THAN 160 10/31/2010     Cervicalgia 9/14/2018     Chronic allergic rhinitis due to other allergic trigger, unspecified seasonality 9/14/2018     Constipation, unspecified constipation type 9/14/2018     Family history of diabetes mellitus 10/28/2013     GERD (gastroesophageal reflux disease) 3/17/2011     History of neck pain 1/19/2019     Hx of abdominal pain 1/19/2019     Insomnia, unspecified type 10/12/2018     Mild single current episode of major depressive disorder (H) 9/14/2018     Skin macule 10/12/2018     Vaginal atrophy 10/8/2015       Past Surgical History:   Procedure Laterality Date     HYSTERECTOMY, VAGINAL       LAPAROSCOPIC APPENDECTOMY         Family History   Problem Relation Age of Onset     Diabetes Mother      Diabetes  "Brother      Colorectal Cancer Father      Diabetes Brother      Diabetes Brother        Social History     Tobacco Use     Smoking status: Never Smoker     Smokeless tobacco: Never Used   Substance Use Topics     Alcohol use: No     Alcohol/week: 0.0 oz         Reviewed and updated as needed this visit by clinical staff       Reviewed and updated as needed this visit by Provider         No fevers no dysuria IBS is likely.    This document serves as a record of the services and decisions personally performed and made by David Bustos MD. It was created on his behalf by Luca Day, a trained medical scribe. The creation of this document is based on the provider's statements to the medical scribe.  Luca Day February 15, 2019 2:17 PM      OBJECTIVE:     /62 (BP Location: Right arm, Patient Position: Chair, Cuff Size: Adult Regular)   Pulse 68   Temp 97.6  F (36.4  C) (Oral)   Resp 22   Ht 1.575 m (5' 2\")   Wt 59.4 kg (131 lb)   LMP  (LMP Unknown)   SpO2 100%   Breastfeeding? No   BMI 23.96 kg/m    Body mass index is 23.96 kg/m .    ABDOMEN: soft, nontender, no hepatosplenomegaly, no masses and bowel sounds normal    Diagnostic Test Results:  No results found for this or any previous visit (from the past 24 hour(s)).    ASSESSMENT/PLAN:   (R10.13) Abdominal pain, epigastric  (primary encounter diagnosis)  Comment: Objective evaluation is remote   Plan: US Abdomen Limited, ranitidine (ZANTAC) 300 MG         Tablet  endoscopy            (Z80.0) Family history of colon cancer  Comment: Father  Plan: GASTROENTEROLOGY ADULT REF PROCEDURE ONLY         Onesimo Zaragoza [phone]; No Provider         Preference                    The information in this document, created by the medical scribe for me, accurately reflects the services I personally performed and the decisions made by me. I have reviewed and approved this document for accuracy prior to leaving the patient care area.  February 15, 2019 1:39 " PM      David Bustos MD  [facility]

## 2019-02-20 ENCOUNTER — HOSPITAL ENCOUNTER (OUTPATIENT)
Dept: ULTRASOUND IMAGING | Facility: CLINIC | Age: 59
Discharge: HOME | End: 2019-02-20
Attending: FAMILY MEDICINE | Admitting: FAMILY MEDICINE
Payer: COMMERCIAL

## 2019-02-20 DIAGNOSIS — R10.13 ABDOMINAL PAIN, EPIGASTRIC: ICD-10-CM

## 2019-02-20 PROCEDURE — 76705 ECHO EXAM OF ABDOMEN: CPT

## 2019-02-20 NOTE — LETTER
February 22, 2019      Mary Carbajal  [address] [place]  [facility] [address]        Dear ,    We are writing to inform you of your test results. This test is OK. They should call you about the other tests.      Resulted Orders   US Abdomen Limited    Narrative    ULTRASOUND ABDOMEN LIMITED RIGHT UPPER QUADRANT  2/20/2019 10:10 AM     HISTORY: Postprandial epigastric pain.    COMPARISON: None.    FINDINGS: Normal hepatic echogenicity. No hepatic masses. The  gallbladder is unremarkable without gallstones, wall thickening or  pericholecystic fluid. No focal tenderness over the gallbladder. No  intra- or extrahepatic biliary dilatation. The common duct measures  0.4 cm in diameter. The right kidney has normal size and echogenicity,  measuring 10.0 cm in length. No right intrarenal collecting system  dilatation, calculi or masses. No free fluid in the upper right  hemiabdomen.      Impression    IMPRESSION:   1. Unremarkable appearance of the gallbladder and liver.  2. No biliary dilatation.    SANDRA MONDRAGON MD       If you have any questions or concerns, please call the clinic at the number listed above.       Sincerely,        David Bustos MD

## 2019-02-22 ENCOUNTER — TELEPHONE (OUTPATIENT)
Dept: FAMILY MEDICINE | Facility: CLINIC | Age: 59
End: 2019-02-22

## 2019-02-22 NOTE — LETTER
[address] [address]  Phone: [phone]    02/26/19    Mary FAYE [address] [place] [id number]    Dear Mary:     We have tried to contact you regarding a recent referral. Dr Bustos has referred to [facility] for an upper endoscopy. To schedule, please call [phone]. Please be aware that coverage of these services is subject to the terms and limitations of your health insurance plan.  Call member services at your health plan with any benefit or coverage questions.  Any procedures must be performed at a [facility] facility or coordinated by your clinic's referral office. Please bring a list of your current medications with you.          Sincerely,      David Bustos MD/Toya BORJA RN

## 2019-02-22 NOTE — TELEPHONE ENCOUNTER
Called Sebastian, no CTC on file.  He is not with Mary right now.  Will call back when she is with him.  Discussed CTC being signed so can speak with him in future.  Yesi Lee RN    [facility] [phone]    Notes recorded by Asim Amaya MD on 2/22/2019 at 11:43 AM CST  This test is OK. They should call you about the other tests  ASIM AMAYA

## 2019-02-22 NOTE — TELEPHONE ENCOUNTER
Pts  calling to obtain US results  Results not reviewed yet    Is follow up to see GI for endoscopy or colonoscopy?     # [phone] (home)     Route to provider to review and advise    Toya Velazquez RN Nurse Triage

## 2019-02-22 NOTE — TELEPHONE ENCOUNTER
She is to be contacted by the gastro [facility] for endoscopy at [place]  They have not yet called?  David Bustos

## 2019-02-22 NOTE — RESULT ENCOUNTER NOTE
Addended by: Guillermo Calvo on: 3/30/2018 02:00 PM     Modules accepted: Orders This test is OK. They should call you about the other tests  ASIM AMAYA

## 2019-02-26 NOTE — TELEPHONE ENCOUNTER
"Call out to pt, Mary at work  Phone number on file is husbands cell phone \"because I am the policy cantrell\"  He provided me with pts mobile number  LM on pts phone with phone number to call to get endoscopy scheduled  Letter sent to pt with copy of referral and number to call to schedule testing    Toya Velazquez RN, BS  Clinical Nurse Triage.    "

## 2019-02-26 NOTE — TELEPHONE ENCOUNTER
Pt returned call  Pt has broken english  Wrote down number to call so she can schedule endoscopy  Toya Velazquez RN, BS  Clinical Nurse Triage.

## 2019-03-01 ENCOUNTER — OFFICE VISIT (OUTPATIENT)
Dept: FAMILY MEDICINE | Facility: CLINIC | Age: 59
End: 2019-03-01
Payer: COMMERCIAL

## 2019-03-01 VITALS
TEMPERATURE: 98 F | SYSTOLIC BLOOD PRESSURE: 113 MMHG | RESPIRATION RATE: 16 BRPM | BODY MASS INDEX: 23.92 KG/M2 | DIASTOLIC BLOOD PRESSURE: 68 MMHG | HEART RATE: 73 BPM | HEIGHT: 62 IN | WEIGHT: 130 LBS

## 2019-03-01 DIAGNOSIS — R10.13 ABDOMINAL PAIN, EPIGASTRIC: Primary | ICD-10-CM

## 2019-03-01 DIAGNOSIS — Z12.11 SPECIAL SCREENING FOR MALIGNANT NEOPLASMS, COLON: ICD-10-CM

## 2019-03-01 DIAGNOSIS — R10.13 DYSPEPSIA: ICD-10-CM

## 2019-03-01 PROCEDURE — 99213 OFFICE O/P EST LOW 20 MIN: CPT | Performed by: FAMILY MEDICINE

## 2019-03-01 ASSESSMENT — PATIENT HEALTH QUESTIONNAIRE - PHQ9
SUM OF ALL RESPONSES TO PHQ QUESTIONS 1-9: 0
10. IF YOU CHECKED OFF ANY PROBLEMS, HOW DIFFICULT HAVE THESE PROBLEMS MADE IT FOR YOU TO DO YOUR WORK, TAKE CARE OF THINGS AT HOME, OR GET ALONG WITH OTHER PEOPLE: NOT DIFFICULT AT ALL
SUM OF ALL RESPONSES TO PHQ QUESTIONS 1-9: 0

## 2019-03-01 ASSESSMENT — ANXIETY QUESTIONNAIRES
1. FEELING NERVOUS, ANXIOUS, OR ON EDGE: NOT AT ALL
GAD7 TOTAL SCORE: 0
6. BECOMING EASILY ANNOYED OR IRRITABLE: NOT AT ALL
4. TROUBLE RELAXING: NOT AT ALL
GAD7 TOTAL SCORE: 0
7. FEELING AFRAID AS IF SOMETHING AWFUL MIGHT HAPPEN: NOT AT ALL
GAD7 TOTAL SCORE: 0
2. NOT BEING ABLE TO STOP OR CONTROL WORRYING: NOT AT ALL
3. WORRYING TOO MUCH ABOUT DIFFERENT THINGS: NOT AT ALL
5. BEING SO RESTLESS THAT IT IS HARD TO SIT STILL: NOT AT ALL
7. FEELING AFRAID AS IF SOMETHING AWFUL MIGHT HAPPEN: NOT AT ALL

## 2019-03-01 ASSESSMENT — MIFFLIN-ST. JEOR: SCORE: 1127.93

## 2019-03-01 NOTE — PROGRESS NOTES
SUBJECTIVE:   Mary Carbajal is a 59 year old female who presents to clinic today for the following health issues:    Follow up on Abdominal pain and Ultra Sound results      HPI  Answers for HPI/ROS submitted by the patient on 3/1/2019   If you checked off any problems, how difficult have these problems made it for you to do your work, take care of things at home, or get along with other people?: Not difficult at all  PHQ9 TOTAL SCORE: 0  VIKTORIA 7 TOTAL SCORE: 0    Special screening for malignant neoplasms, colon  (primary encounter diagnosis)- Due*    Dyspepsia- Patient reports Zantac was effective, she is not having symptoms at this time.     Abdominal pain, epigastric- No pain today.          Problem list and histories reviewed & adjusted, as indicated.  Additional history: as documented      Past Medical History:   Diagnosis Date     CARDIOVASCULAR SCREENING; LDL GOAL LESS THAN 160 10/31/2010     Cervicalgia 9/14/2018     Chronic allergic rhinitis due to other allergic trigger, unspecified seasonality 9/14/2018     Constipation, unspecified constipation type 9/14/2018     Family history of diabetes mellitus 10/28/2013     GERD (gastroesophageal reflux disease) 3/17/2011     History of neck pain 1/19/2019     Hx of abdominal pain 1/19/2019     Insomnia, unspecified type 10/12/2018     Mild single current episode of major depressive disorder (H) 9/14/2018     Skin macule 10/12/2018     Vaginal atrophy 10/8/2015       Past Surgical History:   Procedure Laterality Date     HYSTERECTOMY, VAGINAL       LAPAROSCOPIC APPENDECTOMY         Family History   Problem Relation Age of Onset     Diabetes Mother      Diabetes Brother      Colorectal Cancer Father      Diabetes Brother      Diabetes Brother        Social History     Tobacco Use     Smoking status: Never Smoker     Smokeless tobacco: Never Used   Substance Use Topics     Alcohol use: No     Alcohol/week: 0.0 oz         ROS:  No abdominal symptoms no intolerance  "great mood      This document serves as a record of the services and decisions personally performed and made by David Bustos MD. It was created on his behalf by Luca Day, a trained medical scribe. The creation of this document is based on the provider's statements to the medical scribe.  Luca Day March 1, 2019 1:27 PM      OBJECTIVE:     /68 (BP Location: Right arm, Patient Position: Chair, Cuff Size: Adult Large)   Pulse 73   Temp 98  F (36.7  C) (Oral)   Resp 16   Ht 1.575 m (5' 2\")   Wt 59 kg (130 lb)   LMP  (LMP Unknown)   Breastfeeding? No   BMI 23.78 kg/m    Body mass index is 23.78 kg/m .    Affect bright    Diagnostic Test Results:  No results found for this or any previous visit (from the past 24 hour(s)).    ASSESSMENT/PLAN:   (Z12.11) Special screening for malignant neoplasms, colon  (primary encounter diagnosis)  Comment: Arrange  Plan: GASTROENTEROLOGY ADULT REF PROCEDURE ONLY         Onesimo Zaragoza [phone]; No Provider         Preference            (R10.13) Dyspepsia  Comment: Current evaluation has been unrevealing.  It is felt that endoscopy is needed.  She is responsive to Zantac continue  Plan: GASTROENTEROLOGY ADULT REF PROCEDURE ONLY         Onesimo Zaragoza [phone]; No Provider         Preference            (R10.13) Abdominal pain, epigastric  Comment: Responsive to Zantac.  Upper GI endoscopy      The information in this document, created by the medical scribe for me, accurately reflects the services I personally performed and the decisions made by me. I have reviewed and approved this document for accuracy prior to leaving the patient care area.  March 1, 2019 1:27 PM      David Bustos MD  [facility]"

## 2019-03-02 ASSESSMENT — PATIENT HEALTH QUESTIONNAIRE - PHQ9: SUM OF ALL RESPONSES TO PHQ QUESTIONS 1-9: 0

## 2019-03-02 ASSESSMENT — ANXIETY QUESTIONNAIRES: GAD7 TOTAL SCORE: 0

## 2019-03-15 ENCOUNTER — HOSPITAL ENCOUNTER (OUTPATIENT)
Facility: CLINIC | Age: 59
Discharge: HOME | End: 2019-03-15
Attending: INTERNAL MEDICINE | Admitting: INTERNAL MEDICINE
Payer: COMMERCIAL

## 2019-03-15 VITALS
OXYGEN SATURATION: 97 % | SYSTOLIC BLOOD PRESSURE: 109 MMHG | HEART RATE: 51 BPM | RESPIRATION RATE: 16 BRPM | DIASTOLIC BLOOD PRESSURE: 67 MMHG

## 2019-03-15 LAB
COLONOSCOPY: NORMAL
UPPER GI ENDOSCOPY: NORMAL

## 2019-03-15 PROCEDURE — G0121 COLON CA SCRN NOT HI RSK IND: HCPCS | Performed by: INTERNAL MEDICINE

## 2019-03-15 PROCEDURE — G0500 MOD SEDAT ENDO SERVICE >5YRS: HCPCS | Performed by: INTERNAL MEDICINE

## 2019-03-15 PROCEDURE — 43235 EGD DIAGNOSTIC BRUSH WASH: CPT | Performed by: INTERNAL MEDICINE

## 2019-03-15 PROCEDURE — 25000128 H RX IP 250 OP 636: Performed by: INTERNAL MEDICINE

## 2019-03-15 PROCEDURE — 45378 DIAGNOSTIC COLONOSCOPY: CPT | Performed by: INTERNAL MEDICINE

## 2019-03-15 PROCEDURE — 99153 MOD SED SAME PHYS/QHP EA: CPT

## 2019-03-15 PROCEDURE — 25000125 ZZHC RX 250: Performed by: INTERNAL MEDICINE

## 2019-03-15 RX ORDER — FENTANYL CITRATE 50 UG/ML
INJECTION, SOLUTION INTRAMUSCULAR; INTRAVENOUS PRN
Status: DISCONTINUED | OUTPATIENT
Start: 2019-03-15 | End: 2019-03-15 | Stop reason: HOSPADM

## 2019-03-15 RX ORDER — FLUMAZENIL 0.1 MG/ML
0.2 INJECTION, SOLUTION INTRAVENOUS
Status: DISCONTINUED | OUTPATIENT
Start: 2019-03-15 | End: 2019-03-15 | Stop reason: HOSPADM

## 2019-03-15 RX ORDER — LIDOCAINE 40 MG/G
CREAM TOPICAL
Status: DISCONTINUED | OUTPATIENT
Start: 2019-03-15 | End: 2019-03-15 | Stop reason: HOSPADM

## 2019-03-15 RX ORDER — NALOXONE HYDROCHLORIDE 0.4 MG/ML
.1-.4 INJECTION, SOLUTION INTRAMUSCULAR; INTRAVENOUS; SUBCUTANEOUS
Status: DISCONTINUED | OUTPATIENT
Start: 2019-03-15 | End: 2019-03-15 | Stop reason: HOSPADM

## 2019-03-15 RX ORDER — ONDANSETRON 4 MG/1
4 TABLET, ORALLY DISINTEGRATING ORAL EVERY 6 HOURS PRN
Status: DISCONTINUED | OUTPATIENT
Start: 2019-03-15 | End: 2019-03-15 | Stop reason: HOSPADM

## 2019-03-15 RX ORDER — ONDANSETRON 2 MG/ML
4 INJECTION INTRAMUSCULAR; INTRAVENOUS
Status: DISCONTINUED | OUTPATIENT
Start: 2019-03-15 | End: 2019-03-15 | Stop reason: HOSPADM

## 2019-03-15 RX ORDER — ONDANSETRON 2 MG/ML
4 INJECTION INTRAMUSCULAR; INTRAVENOUS EVERY 6 HOURS PRN
Status: DISCONTINUED | OUTPATIENT
Start: 2019-03-15 | End: 2019-03-15 | Stop reason: HOSPADM

## 2019-03-15 NOTE — DISCHARGE INSTRUCTIONS
"The patient has received a copy of the Provation  report the doctor has written and discharge instructions have been discussed with the patient and responsible adult.  All questions were addressed and answered prior to patient discharge.      Tips to Control Acid Reflux  To control acid reflux, you ll need to make some basic diet and lifestyle changes. The simple steps outlined below may be all you ll need to relieve discomfort.   Watch What You Eat      Avoid fatty foods and spicy foods.    Eat fewer acidic foods, such as citrus and tomato-based foods. These can increase symptoms.     Limit drinking alcohol, caffeine, and fizzy beverages. All increase acid reflux.    Try limiting chocolate, peppermint, and spearmint. These can worsen acid reflux in some people.    Watch When You Eat    Avoid lying down for 3 hours after eating.    Do not snack before going to bed.    Raise Your Head  Raising your head and upper body by 4\" to 6\" helps limit reflux when you re lying down. Put blocks under the head of the bed frame to raise it.                    [id number] [facility], [address] [place] [address], [place] [address]. All rights reserved. This information is not intended as a substitute for professional medical care. Always follow your healthcare professional's instructions.  "

## 2019-03-15 NOTE — LETTER
March 5, 2019      Mary Carbajal  [address]  [facility] [id number]        Dear Mary,     Thank you for choosing [facility]. You are scheduled for the following services.     Date:  3/15/2019  Friday       Procedure: UPPER ENDOSCOPY & COLONOSCOPY  Doctor:  Dr. Orlin Chaudhry          Arrival Time:   12:30 pm  *check in at Emergency/Endoscopy desk*  Procedure Time:   1:00 pm  Location:   [facility] [place]        Endoscopy Department, First Floor (Enter through ER Doors) *         [address] [place], [place] [address]      [phone] or [phone] ([facility]) to reschedule      MIRALAX -GATORADE  PREP    Upper Endoscopy or Esophagogastroduodenoscopy (EGD) is a test performed to evaluate symptoms of persistent abdominal pain, nausea, vomiting or difficulty swallowing. It may also be used to treat various conditions of the upper gastrointestinal (GI) tract, such as bleeding, narrowing or abnormal growths. During the procedure, a doctor examines the lining of your esophagus, stomach and the first part of your small intestine through a thin, flexible tube called an endoscope. If growths or other abnormalities are found during the procedure, the doctor may remove the abnormal tissue (biopsy) for further examination.     Colonoscopy is the most accurate test to detect colon polyps and colon cancer; and the only test where polyps can be removed. During this procedure, a doctor examines the lining of your large intestine and rectum through a flexible tube.     Transportation  Arrange for a ride for the day of your procedures with a responsible adult.  A taxi ride is not an option unless you are accompanied by a responsible adult. If you fail to arrange transportation with a responsible adult, your procedure will be cancelled and rescheduled.    Purchase the  following supplies at your local pharmacy:  - 2 (two) bisacodyl tablets: each tablet contains 5  mg.  (Dulcolax  laxative NOT Dulcolax  stool softener)   - 1 (one) 8.3 oz bottle of Polyethylene Glycol (PEG) 3350 Powder   (MiraLAX , Smooth LAX , ClearLAX  or equivalent)  - 64 oz Gatorade    Regular Gatorade, Gatorade G2 , Powerade , Powerade Zero  or Pedialyte  is acceptable. Red colored flavors are not allowed; all other colors (yellow, green, orange, purple and blue) are okay. It is also okay to buy two 2.12 oz packets of powdered Gatorade that can be mixed with water to a total volume of 64 oz of liquid.  - 1 (one) 10 oz bottle of Magnesium Citrate (Red colored flavors are not allowed)  It is also okay for you to use a 0.5 oz package of powdered magnesium citrate (17 g) mixed with 10 oz of water.      PREPARATION FOR COLONOSCOPY  7 days before:    Discontinue fiber supplements and medications containing iron. This includes Metamucil  and Fibercon ; and multivitamins with iron.  3 days before:    Begin a low-fiber diet. A low-fiber diet helps making the cleanout more effective.     Examples of a low-fiber diet include (but are not limited to): white bread, white rice, pasta, crackers, fish, chicken, eggs, ground beef, creamy peanut butter, cooked/steamed/boiled vegetables, canned fruit, bananas, melons, milk, plain yogurt cheese, salad dressing and other condiments.     The following are not allowed on a low-fiber diet: seeds, nuts, popcorn, bran, whole wheat, corn, quinoa, raw fruits and vegetables, berries and dried fruit, beans and lentils.    For additional details on low-fiber diet, please refer to the table on the last page.  2 days before:    Continue the low-fiber diet.     Drink at least 8 glasses of water throughout the day.     Stop eating solid foods at 11:45 pm.  1 day before:    In the morning: begin a clear liquid diet (liquids you can see through).     Examples of a clear liquid diet include: water, clear broth or bouillon, coffee or tea without milk or cream Gatorade, Pedialyte or Powerade,  carbonated and non-carbonated soft drinks (Sprite , 7-Up , ginger ale), strained fruit juices without pulp (apple, white grape, white cranberry), Jell-O  and popsicles.     The following are not allowed on a clear liquid diet: red liquids, alcoholic beverages,  dairy products (milk, creamer, and yogurt), protein shakes, creamy broths, juice with pulp and chewing tobacco.    At noon: take 2 (two) bisacodyl tablets     At 4 (and no later than 6pm): start drinking the Miralax-Gatorade preparation (8.3 oz of Miralax mixed with 64 oz of Gatorade in a large pitcher). Drink 1(one) 8 oz glass every 15 minutes thereafter, until the mixture is gone.      COLON CLEANSING TIPS: drink adequate amounts of fluids before and after your colon cleansing to prevent dehydration. Stay near a toilet because you will have diarrhea. Even if you are sitting on the toilet, continue to drink the cleansing solution every 15 minutes. If you feel nauseous or vomit, rinse your mouth with water, take a 15 to 30-minute-break and then continue drinking the solution. You will be uncomfortable until the stool has flushed from your colon (in about 2 to 4 hours). You may feel chilled.    Day of your procedure  You may take all of your morning medications including blood pressure medications, blood thinners (if you have not been instructed to stop these by our office), methadone, anti-seizure medications with sips of water 3 hours prior to your procedure or earlier. Do not take insulin or vitamins prior to your procedure. Continue the clear liquid diet.   4 hours prior: drink 10 oz of magnesium citrate. It may be easier to drink it with a straw.    STOP consuming all liquids after that.     Do not take anything by mouth during this time.     Allow extra time to travel to your procedure as you may need to stop and use a restroom along the way.  You are ready for the procedure, if you followed all instructions and your stool is no longer formed, but clear  or yellow liquid. If you are unsure whether your colon is clean, please call our office at [phone] before you leave for your appointment.  Bring the following to your procedure:  - Insurance Card/Photo ID.   - List of current medications including over-the-counter medications and supplements.   - Your rescue inhaler if you currently use one to control asthma.    Canceling or rescheduling your appointment:   If you must cancel or reschedule your appointment, please call [phone] as soon as possible.    COLONOSCOPY PRE-PROCEDURE CHECKLIST  If you have diabetes, ask your regular doctor for diet and medication restrictions.  If you take an anticoagulant or anti-platelet medication (such as Coumadin , Lovenox , Pradaxa , Xarelto , Eliquis , etc.), please call your primary doctor for advice on holding this medication.  If you take aspirin you may continue to do so.  If you are or may be pregnant, please discuss the risks and benefits of this procedure with your doctor.    What happens during a colonoscopy?  Plan to spend up to two hours, starting at registration time, at the endoscopy center the day of your procedure. The colonoscopy takes an average of 15 to 30 minutes. Recovery time is about 30 minutes.     Before the exam:    You will change into a gown.    Your medical history and medication list will be reviewed with you, unless that has been done over the phone prior to the procedure.     A nurse will insert an intravenous (IV) line into your hand or arm.    The doctor will meet with you and will give you a consent form to sign.    During the exam:     Medicine will be given through the IV line to help you relax.     Your heart rate and oxygen levels will be monitored. If your blood pressure is low, you may be given fluids through the IV line.     The doctor will insert a flexible hollow tube, called a colonoscope, into your rectum. The scope will be advanced slowly through the large intestine  (colon).    You may have a feeling of fullness or pressure.     If an abnormal tissue or a polyp is found, the doctor may remove it through the endoscope for closer examination, or biopsy. Tissue removal is painless    After the exam:           Any tissue samples removed during the exam will be sent to a lab for evaluation. It may take 5-7 working days for you to be notified of the results.     A nurse will provide you with complete discharge instructions before you leave the endoscopy center. Be sure to ask the nurse for specific instructions if you take blood thinners such as Aspirin, Coumadin or Plavix.     The doctor will prepare a full report for you and for the physician who referred you for the procedure.     Your doctor will talk with you about the initial results of your exam.      Medication given during the exam will prohibit you from driving for the rest of the day.     Following the exam, you may resume your normal diet. Your first meal should be light, no greasy foods. Avoid alcohol until the next day.     You may resume your regular activities the day after the procedure.       LOW-FIBER DIET  Foods RECOMMENDED Foods to AVOID   Breads, Cereal, Rice and Pasta:   White bread, rolls, biscuits, croissant and isaiah toast.   Waffles, Turkish toast and pancakes.   White rice, noodles, pasta, macaroni and peeled cooked potatoes.   Plain crackers and saltines.   Cooked cereals: farina, cream of rice.   Cold cereals: Puffed Rice , Rice Krispies , Corn Flakes  and Special K    Breads, Cereal, Rice and Pasta:   Breads or rolls with nuts, seeds or fruit.   Whole wheat, pumpernickel, rye breads and cornbread.   Potatoes with skin, brown or wild rice, and kasha (buckwheat).     Vegetables:   Tender cooked and canned vegetables without seeds: carrots, asparagus tips, green or wax beans, pumpkin, spinach, lima beans. Vegetables:   Raw or steamed vegetables (w/ or without seeds)   Sauerkraut.   Winter squash, peas,  broccoli, Brussel sprouts, cabbage, onions, cauliflower, baked beans, peas and corn.   Fruits:   Strained fruit juice.   Canned fruit, except pineapple.   Ripe bananas and melon. Fruits:   Prunes and prune juice.   Raw fruits.   Dried fruits: figs, dates and raisins.   Milk/Dairy:   Milk: plain or flavored; yogurt; ice cream.   Custard; cheese and cottage cheese Milk/Dairy:     Meat and other proteins:   Ground, well-cooked tender beef, lamb, ham, veal, pork, fish, poultry, organ meats and eggs.   Peanut butter without nuts. Meat and other proteins:   Tough, fibrous meats with gristle.   Dry beans and peas; lentils and Tofu   Peanut butter with nuts.   Fats, Snack, Sweets, Condiments and Beverages:   Margarine, butter, oils, mayonnaise, sour cream and salad dressing, plain gravy.   Sugar, hard candy, clear jelly, honey and syrup.   Spices, cooked herbs, bouillon, broth and soups made with allowed vegetable, ketchup and mustard.   Coffee, tea and carbonated drinks.   Plain cakes, cookies and pretzels.   Gelatin, plain puddings, custard, ice cream, sherbet and popsicles. Fats, Snack, Sweets, Condiments and Beverages:   Nuts, seeds and coconut.   Jam, marmalade and preserves.   Pickles, olives, relish and horseradish.   All desserts containing nuts, seeds, dried fruit and coconut; or made from whole grains or bran.   Candy made with nuts or seeds.   Popcorn.             DIRECTIONS TO THE ENDOSCOPY DEPARTMENT  From the north ([facility])  Take [address] south, exit on [address] . Get into the left hand shaw, turn left (east), go one-half mile to [address] and turn left. Go north to the first stoplight, take a right on [address] and follow it to the Emergency entrance.    From the south ([facility])  Take 35N to the [address]E split and exit on [address] [address]. On [address] [address], turn left (west) to [address]. Turn right (north) on [address]. Go north to the first  stoplight, take a right on [address] and follow it to the Emergency entrance.    From the east via 35E ([facility])  Take [address] south to [address]  exit. Turn right on [address]. Go west to [address]. Turn right (north) on [address]. Go to the first stoplight, take a right and follow on [address] to the Emergency entrance.    From the east via [address] ([facility])  Take [address] to [address]. Turn left (south) on [address] to [address]. Turn left (east) on [address] and follow it to the Emergency entrance.    From the west via [address] ([place], [place])  Take [address] east to [address]. Turn right (south) on [address] to [address]. Turn left (east) on [address] and follow it to the Emergency entrance.

## 2019-03-15 NOTE — H&P
Pre-Endoscopy History and Physical     Mary Carbajal MRN# [id number]   YOB: 1960 Age: 59 year old     Date of Procedure: 3/15/2019  Primary care provider: David Bustos  Type of Endoscopy: Colonoscopy with possible biopsy, possible polypectomy and Gastroscopy with possible biopsy, possible dilation  Reason for Procedure: screen,reflux  Type of Anesthesia Anticipated: Conscious Sedation    HPI:    Mary is a 59 year old female who will be undergoing the above procedure.      A history and physical has been performed. The patient's medications and allergies have been reviewed. The risks and benefits of the procedure and the sedation options and risks were discussed with the patient.  All questions were answered and informed consent was obtained.      She denies a personal or family history of anesthesia complications or bleeding disorders.     Patient Active Problem List   Diagnosis     CARDIOVASCULAR SCREENING; LDL GOAL LESS THAN 160     GERD (gastroesophageal reflux disease)     Family history of diabetes mellitus     Vaginal atrophy     Chronic allergic rhinitis due to other allergic trigger, unspecified seasonality     Mild single current episode of major depressive disorder (H)     Constipation, unspecified constipation type     Cervicalgia     Seasonal allergies     Insomnia, unspecified type     Skin macule        Past Medical History:   Diagnosis Date     CARDIOVASCULAR SCREENING; LDL GOAL LESS THAN 160 10/31/2010     Cervicalgia 9/14/2018     Chronic allergic rhinitis due to other allergic trigger, unspecified seasonality 9/14/2018     Constipation, unspecified constipation type 9/14/2018     Family history of diabetes mellitus 10/28/2013     GERD (gastroesophageal reflux disease) 3/17/2011     History of neck pain 1/19/2019     Hx of abdominal pain 1/19/2019     Insomnia, unspecified type 10/12/2018     Mild single current episode of major depressive disorder (H) 9/14/2018     Skin macule  "10/12/2018     Vaginal atrophy 10/8/2015        Past Surgical History:   Procedure Laterality Date     HYSTERECTOMY, VAGINAL       LAPAROSCOPIC APPENDECTOMY         Social History     Tobacco Use     Smoking status: Never Smoker     Smokeless tobacco: Never Used   Substance Use Topics     Alcohol use: No     Alcohol/week: 0.0 oz       Family History   Problem Relation Age of Onset     Diabetes Mother      Diabetes Brother      Colorectal Cancer Father      Diabetes Brother      Diabetes Brother      Colon Cancer No family hx of        Prior to Admission medications    Medication Sig Start Date End Date Taking? Authorizing Provider   ranitidine (ZANTAC) 300 MG tablet Take 0.5 tablets (150 mg) by mouth At Bedtime 2/15/19 2/15/20 Yes David Bustos MD       Allergies   Allergen Reactions     Sinus & Allergy [Pseudoephedrine]      Gets sinuses from pollin         REVIEW OF SYSTEMS:   5 point ROS negative except as noted above in HPI, including Gen., Resp., CV, GI &  system review.    PHYSICAL EXAM:   LMP  (LMP Unknown)  Estimated body mass index is 23.78 kg/m  as calculated from the following:    Height as of 3/1/19: 1.575 m (5' 2\").    Weight as of 3/1/19: 59 kg (130 lb).   GENERAL APPEARANCE: alert, and oriented  MENTAL STATUS: alert  AIRWAY EXAM: Mallampatti Class I (visualization of the soft palate, fauces, uvula, anterior and posterior pillars)  RESP: lungs clear to auscultation - no rales, rhonchi or wheezes  CV: regular rates and rhythm  DIAGNOSTICS:    Not indicated    IMPRESSION   ASA Class 1 - Healthy patient, no medical problems    PLAN:   Plan for Colonoscopy with possible biopsy, possible polypectomy and Gastroscopy with possible biopsy, possible dilation. We discussed the risks, benefits and alternatives and the patient wished to proceed.    The above has been forwarded to the consulting provider.      Signed Electronically by: Orlin Chaudhry  March 15, 2019          "

## 2019-08-01 ENCOUNTER — OFFICE VISIT (OUTPATIENT)
Dept: FAMILY MEDICINE | Facility: CLINIC | Age: 59
End: 2019-08-01
Payer: COMMERCIAL

## 2019-08-01 VITALS
TEMPERATURE: 98.2 F | HEIGHT: 62 IN | SYSTOLIC BLOOD PRESSURE: 102 MMHG | RESPIRATION RATE: 16 BRPM | BODY MASS INDEX: 23.92 KG/M2 | WEIGHT: 130 LBS | HEART RATE: 72 BPM | DIASTOLIC BLOOD PRESSURE: 62 MMHG | OXYGEN SATURATION: 98 %

## 2019-08-01 DIAGNOSIS — F32.0 MILD SINGLE CURRENT EPISODE OF MAJOR DEPRESSIVE DISORDER (H): ICD-10-CM

## 2019-08-01 DIAGNOSIS — Z00.00 ROUTINE GENERAL MEDICAL EXAMINATION AT A HEALTH CARE FACILITY: Primary | ICD-10-CM

## 2019-08-01 DIAGNOSIS — R53.83 FATIGUE, UNSPECIFIED TYPE: ICD-10-CM

## 2019-08-01 LAB
ERYTHROCYTE [DISTWIDTH] IN BLOOD BY AUTOMATED COUNT: 14.6 % (ref 10–15)
HBA1C MFR BLD: 5.5 % (ref 0–5.6)
HCT VFR BLD AUTO: 38.1 % (ref 35–47)
HGB BLD-MCNC: 13.2 G/DL (ref 11.7–15.7)
MCH RBC QN AUTO: 27.3 PG (ref 26.5–33)
MCHC RBC AUTO-ENTMCNC: 34.6 G/DL (ref 31.5–36.5)
MCV RBC AUTO: 79 FL (ref 78–100)
PLATELET # BLD AUTO: 218 10E9/L (ref 150–450)
RBC # BLD AUTO: 4.84 10E12/L (ref 3.8–5.2)
WBC # BLD AUTO: 5.9 10E9/L (ref 4–11)

## 2019-08-01 PROCEDURE — 99213 OFFICE O/P EST LOW 20 MIN: CPT | Mod: 25 | Performed by: FAMILY MEDICINE

## 2019-08-01 PROCEDURE — 85027 COMPLETE CBC AUTOMATED: CPT | Performed by: FAMILY MEDICINE

## 2019-08-01 PROCEDURE — 36415 COLL VENOUS BLD VENIPUNCTURE: CPT | Performed by: FAMILY MEDICINE

## 2019-08-01 PROCEDURE — 84443 ASSAY THYROID STIM HORMONE: CPT | Performed by: FAMILY MEDICINE

## 2019-08-01 PROCEDURE — 83036 HEMOGLOBIN GLYCOSYLATED A1C: CPT | Performed by: FAMILY MEDICINE

## 2019-08-01 PROCEDURE — 80053 COMPREHEN METABOLIC PANEL: CPT | Performed by: FAMILY MEDICINE

## 2019-08-01 PROCEDURE — 82306 VITAMIN D 25 HYDROXY: CPT | Performed by: FAMILY MEDICINE

## 2019-08-01 PROCEDURE — 99396 PREV VISIT EST AGE 40-64: CPT | Performed by: FAMILY MEDICINE

## 2019-08-01 SDOH — SOCIAL STABILITY: SOCIAL NETWORK
DO YOU BELONG TO ANY CLUBS OR ORGANIZATIONS SUCH AS CHURCH GROUPS UNIONS, FRATERNAL OR ATHLETIC GROUPS, OR SCHOOL GROUPS?: YES

## 2019-08-01 SDOH — SOCIAL STABILITY: SOCIAL NETWORK: HOW OFTEN DO YOU GET TOGETHER WITH FRIENDS OR RELATIVES?: THREE TIMES A WEEK

## 2019-08-01 SDOH — SOCIAL STABILITY: SOCIAL NETWORK: HOW OFTEN DO YOU ATTEND CHURCH OR RELIGIOUS SERVICES?: MORE THAN 4 TIMES PER YEAR

## 2019-08-01 SDOH — HEALTH STABILITY: PHYSICAL HEALTH: ON AVERAGE, HOW MANY DAYS PER WEEK DO YOU ENGAGE IN MODERATE TO STRENUOUS EXERCISE (LIKE A BRISK WALK)?: 4 DAYS

## 2019-08-01 SDOH — ECONOMIC STABILITY: FOOD INSECURITY: HOW HARD IS IT FOR YOU TO PAY FOR THE VERY BASICS LIKE FOOD, HOUSING, MEDICAL CARE, AND HEATING?: NOT HARD AT ALL

## 2019-08-01 SDOH — SOCIAL STABILITY: SOCIAL INSECURITY: ARE YOU MARRIED, WIDOWED, DIVORCED, SEPARATED, NEVER MARRIED, OR LIVING WITH A PARTNER?: MARRIED

## 2019-08-01 SDOH — ECONOMIC STABILITY: TRANSPORTATION INSECURITY
IN THE PAST 12 MONTHS, HAS THE LACK OF TRANSPORTATION KEPT YOU FROM MEDICAL APPOINTMENTS OR FROM GETTING MEDICATIONS?: NO

## 2019-08-01 SDOH — SOCIAL STABILITY: SOCIAL NETWORK: HOW OFTEN DO YOU ATTENT MEETINGS OF THE CLUB OR ORGANIZATION YOU BELONG TO?: 1 TO 4 TIMES PER YEAR

## 2019-08-01 SDOH — HEALTH STABILITY: MENTAL HEALTH: HOW OFTEN DO YOU HAVE A DRINK CONTAINING ALCOHOL?: NEVER

## 2019-08-01 SDOH — HEALTH STABILITY: PHYSICAL HEALTH: ON AVERAGE, HOW MANY MINUTES DO YOU ENGAGE IN EXERCISE AT THIS LEVEL?: 120 MIN

## 2019-08-01 SDOH — ECONOMIC STABILITY: TRANSPORTATION INSECURITY: IN THE PAST 12 MONTHS, HAS LACK OF TRANSPORTATION KEPT YOU FROM MEDICAL APPOINTMENTS OR FROM GETTING MEDICATIONS?: NO

## 2019-08-01 SDOH — SOCIAL STABILITY: SOCIAL NETWORK: ARE YOU MARRIED, WIDOWED, DIVORCED, SEPARATED, NEVER MARRIED, OR LIVING WITH A PARTNER?: MARRIED

## 2019-08-01 SDOH — SOCIAL STABILITY: SOCIAL NETWORK: IN A TYPICAL WEEK, HOW MANY TIMES DO YOU TALK ON THE PHONE WITH FAMILY, FRIENDS, OR NEIGHBORS?: THREE TIMES A WEEK

## 2019-08-01 SDOH — ECONOMIC STABILITY: FOOD INSECURITY: WITHIN THE PAST 12 MONTHS, THE FOOD YOU BOUGHT JUST DIDN'T LAST AND YOU DIDN'T HAVE MONEY TO GET MORE.: NEVER TRUE

## 2019-08-01 SDOH — ECONOMIC STABILITY: INCOME INSECURITY: HOW HARD IS IT FOR YOU TO PAY FOR THE VERY BASICS LIKE FOOD, HOUSING, MEDICAL CARE, AND HEATING?: NOT HARD AT ALL

## 2019-08-01 SDOH — ECONOMIC STABILITY: TRANSPORTATION INSECURITY
IN THE PAST 12 MONTHS, HAS LACK OF TRANSPORTATION KEPT YOU FROM MEETINGS, WORK, OR FROM GETTING THINGS NEEDED FOR DAILY LIVING?: NO

## 2019-08-01 SDOH — HEALTH STABILITY: MENTAL HEALTH: HOW MANY DRINKS CONTAINING ALCOHOL DO YOU HAVE ON A TYPICAL DAY WHEN YOU ARE DRINKING?: PATIENT DECLINED

## 2019-08-01 SDOH — SOCIAL STABILITY: SOCIAL NETWORK: HOW OFTEN DO YOU ATTEND MEETINGS OF THE CLUBS OR ORGANIZATIONS YOU BELONG TO?: 1 TO 4 TIMES PER YEAR

## 2019-08-01 SDOH — ECONOMIC STABILITY: FOOD INSECURITY: WITHIN THE PAST 12 MONTHS, YOU WORRIED THAT YOUR FOOD WOULD RUN OUT BEFORE YOU GOT MONEY TO BUY MORE.: NEVER TRUE

## 2019-08-01 SDOH — ECONOMIC STABILITY: FOOD INSECURITY: WITHIN THE PAST 12 MONTHS, YOU WORRIED THAT YOUR FOOD WOULD RUN OUT BEFORE YOU GOT THE MONEY TO BUY MORE.: NEVER TRUE

## 2019-08-01 SDOH — SOCIAL STABILITY: SOCIAL NETWORK
DO YOU BELONG TO ANY CLUBS OR ORGANIZATIONS SUCH AS CHURCH GROUPS, UNIONS, FRATERNAL OR ATHLETIC GROUPS, OR SCHOOL GROUPS?: YES

## 2019-08-01 SDOH — HEALTH STABILITY: MENTAL HEALTH: HOW MANY STANDARD DRINKS CONTAINING ALCOHOL DO YOU HAVE ON A TYPICAL DAY?: PATIENT DECLINED

## 2019-08-01 ASSESSMENT — ENCOUNTER SYMPTOMS
NAUSEA: 0
FEVER: 0
MYALGIAS: 0
WEAKNESS: 1
BREAST MASS: 0
HEMATOCHEZIA: 0
SORE THROAT: 0
FREQUENCY: 1
DIARRHEA: 0
PARESTHESIAS: 1
CHILLS: 0
SHORTNESS OF BREATH: 0
HEARTBURN: 1
PALPITATIONS: 0
DIZZINESS: 0
EYE PAIN: 0
JOINT SWELLING: 0
ABDOMINAL PAIN: 0
COUGH: 0
NERVOUS/ANXIOUS: 0
HEADACHES: 0
DYSURIA: 0
HEMATURIA: 0
ARTHRALGIAS: 0
CONSTIPATION: 0

## 2019-08-01 ASSESSMENT — ACTIVITIES OF DAILY LIVING (ADL): LACK_OF_TRANSPORTATION: NO

## 2019-08-01 ASSESSMENT — PATIENT HEALTH QUESTIONNAIRE - PHQ9
SUM OF ALL RESPONSES TO PHQ QUESTIONS 1-9: 7
SUM OF ALL RESPONSES TO PHQ QUESTIONS 1-9: 7

## 2019-08-01 ASSESSMENT — MIFFLIN-ST. JEOR: SCORE: 1119.99

## 2019-08-01 NOTE — PATIENT INSTRUCTIONS
Ok to increase vitamin D to 5,000 international unit(s) in the winter time from 2000 international unit(s).   Follow up in 1 year or sooner if needed

## 2019-08-01 NOTE — PROGRESS NOTES
SUBJECTIVE:   CC: Mary Carbajal is an 59 year old woman who presents for preventive health visit.     Healthy Habits:     Getting at least 3 servings of Calcium per day:  Yes    Bi-annual eye exam:  Yes    Dental care twice a year:  Yes    Sleep apnea or symptoms of sleep apnea:  None    Diet:  Regular (no restrictions)    Frequency of exercise:  4-5 days/week    Duration of exercise:  45-60 minutes    Taking medications regularly:  Yes    Medication side effects:  Not applicable    PHQ-2 Total Score: 4    Additional concerns today:  No    Other:  Over the last few months has noticed a decrease in motivation in doing things.   Admits she does arshad with this periodically. Generally going to the gym does help which she does at least 3-4 times a week. Per chart review she has been offered an serotonin specific reuptake inhibitor but she is not interested as she feels it is only intermittent.           Today's PHQ-2 Score:   PHQ-2 ( 1999 [employer]) 8/1/2019   Q1: Little interest or pleasure in doing things 2   Q2: Feeling down, depressed or hopeless 2   PHQ-2 Score 4   Q1: Little interest or pleasure in doing things More than half the days   Q2: Feeling down, depressed or hopeless More than half the days   PHQ-2 Score 4       Abuse: Current or Past(Physical, Sexual or Emotional)- No  Do you feel safe in your environment? Yes    Social History     Tobacco Use     Smoking status: Never Smoker     Smokeless tobacco: Never Used   Substance Use Topics     Alcohol use: No     Alcohol/week: 0.0 oz         Alcohol Use 8/1/2019   Prescreen: >3 drinks/day or >7 drinks/week? No   Prescreen: >3 drinks/day or >7 drinks/week? -       Reviewed orders with patient.  Reviewed health maintenance and updated orders accordingly - Yes  Labs reviewed in EPIC    Mammogram Screening: Patient under age 50, mutual decision reflected in health maintenance.      Pertinent mammograms are reviewed under the imaging tab.  History of abnormal Pap  "smear: Status post benign hysterectomy. Health Maintenance and Surgical History updated.  PAP / HPV Latest Ref Rng & Units 3/23/2018 10/8/2015 9/1/2015   PAP - NIL NIL UNSAT   HPV 16 DNA NEG:Negative Negative - -   HPV 18 DNA NEG:Negative Negative - -   OTHER HR HPV NEG:Negative Negative - -     Reviewed and updated as needed this visit by clinical staff  Tobacco  Allergies  Meds  Fam Hx  Soc Hx        Reviewed and updated as needed this visit by Provider            Review of Systems   Constitutional: Negative for chills and fever.   HENT: Negative for congestion, ear pain, hearing loss and sore throat.    Eyes: Negative for pain and visual disturbance.   Respiratory: Negative for cough and shortness of breath.    Cardiovascular: Negative for chest pain, palpitations and peripheral edema.   Gastrointestinal: Positive for heartburn. Negative for abdominal pain, constipation, diarrhea, hematochezia and nausea.   Breasts:  Negative for tenderness, breast mass and discharge.   Genitourinary: Positive for frequency. Negative for dysuria, genital sores, hematuria, pelvic pain, urgency, vaginal bleeding and vaginal discharge.   Musculoskeletal: Negative for arthralgias, joint swelling and myalgias.   Skin: Negative for rash.   Neurological: Positive for weakness and paresthesias. Negative for dizziness and headaches.   Psychiatric/Behavioral: Negative for mood changes. The patient is not nervous/anxious.           OBJECTIVE:   /62 (BP Location: Right arm, Patient Position: Chair, Cuff Size: Adult Regular)   Pulse 72   Temp 98.2  F (36.8  C) (Oral)   Resp 16   Ht 1.562 m (5' 1.5\")   Wt 59 kg (130 lb)   LMP  (LMP Unknown)   SpO2 98%   Breastfeeding? No   BMI 24.17 kg/m    Physical Exam  GENERAL: healthy, alert and no distress  EYES: Eyes grossly normal to inspection, PERRL and conjunctivae and sclerae normal  HENT: ear canals and TM's normal, nose and mouth without ulcers or lesions  NECK: no adenopathy, " "no asymmetry, masses, or scars and thyroid normal to palpation  RESP: lungs clear to auscultation - no rales, rhonchi or wheezes  CV: regular rate and rhythm, normal S1 S2, no S3 or S4, no murmur, click or rub, no peripheral edema and peripheral pulses strong  ABDOMEN: soft, nontender, no hepatosplenomegaly, no masses and bowel sounds normal  RECTAL (male): normal sphincter tone, no rectal masses, prostate normal size, smooth, nontender without nodules or masses  MS: no gross musculoskeletal defects noted, no edema  NEURO: Normal strength and tone, mentation intact and speech normal  PSYCH: mentation appears normal, affect normal/bright    Diagnostic Test Results:  Labs reviewed in Epic  none     ASSESSMENT/PLAN:   1. Routine general medical examination at a health care facility    - *MA Screening Digital Bilateral; Future  - Hemoglobin A1c  - TSH with free T4 reflex  - CBC with platelets  - Comprehensive metabolic panel    2. Mild single current episode of major depressive disorder (H)  - declines med. She will continue with vitamin D for now   - cholecalciferol 2000 units tablet; Take 1 tablet by mouth daily  Dispense: 90 tablet; Refill: 1    3. Fatigue, unspecified type  - [facility] database   - Hemoglobin A1c  - TSH with free T4 reflex  - Vitamin D Deficiency    COUNSELING:  Reviewed preventive health counseling, as reflected in patient instructions       Regular exercise       Healthy diet/nutrition    Estimated body mass index is 24.17 kg/m  as calculated from the following:    Height as of this encounter: 1.562 m (5' 1.5\").    Weight as of this encounter: 59 kg (130 lb).         reports that she has never smoked. She has never used smokeless tobacco.      Counseling Resources:  ATP IV Guidelines  Pooled Cohorts Equation Calculator  Breast Cancer Risk Calculator  FRAX Risk Assessment  ICSI Preventive Guidelines  Dietary Guidelines for Americans, 2010  USDA's MyPlate  ASA Prophylaxis  Lung CA Screening    Arti " Concha Allen MD  [facility]  Answers for HPI/ROS submitted by the patient on 8/1/2019   Annual Exam:  PHQ9 TOTAL SCORE: 7

## 2019-08-01 NOTE — LETTER
August 6, 2019      Mary Carbajal  [address]  [facility] [address]        Dear ,    We are writing to inform you of your test results.    All your labs from recent visit are good.   For further questions or concerns please let us know.     Resulted Orders   Hemoglobin A1c   Result Value Ref Range    Hemoglobin A1C 5.5 0 - 5.6 %      Comment:      Normal <5.7% Prediabetes 5.7-6.4%  Diabetes 6.5% or higher - adopted from ADA   consensus guidelines.     TSH with free T4 reflex   Result Value Ref Range    TSH 0.94 0.40 - 4.00 mU/L   CBC with platelets   Result Value Ref Range    WBC 5.9 4.0 - 11.0 10e9/L    RBC Count 4.84 3.8 - 5.2 10e12/L    Hemoglobin 13.2 11.7 - 15.7 g/dL    Hematocrit 38.1 35.0 - 47.0 %    MCV 79 78 - 100 fl    MCH 27.3 26.5 - 33.0 pg    MCHC 34.6 31.5 - 36.5 g/dL    RDW 14.6 10.0 - 15.0 %    Platelet Count 218 150 - 450 10e9/L   Comprehensive metabolic panel   Result Value Ref Range    Sodium 140 133 - 144 mmol/L    Potassium 4.1 3.4 - 5.3 mmol/L    Chloride 109 94 - 109 mmol/L    Carbon Dioxide 25 20 - 32 mmol/L    Anion Gap 6 3 - 14 mmol/L    Glucose 112 (H) 70 - 99 mg/dL    Urea Nitrogen 13 7 - 30 mg/dL    Creatinine 0.78 0.52 - 1.04 mg/dL    GFR Estimate 84 >60 mL/min/[1.73_m2]      Comment:      Non  GFR Calc  Starting 12/18/2018, serum creatinine based estimated GFR (eGFR) will be   calculated using the Chronic Kidney Disease Epidemiology Collaboration   (CKD-EPI) equation.      GFR Estimate If Black >90 >60 mL/min/[1.73_m2]      Comment:       GFR Calc  Starting 12/18/2018, serum creatinine based estimated GFR (eGFR) will be   calculated using the Chronic Kidney Disease Epidemiology Collaboration   (CKD-EPI) equation.      Calcium 8.6 8.5 - 10.1 mg/dL    Bilirubin Total 0.2 0.2 - 1.3 mg/dL    Albumin 3.6 3.4 - 5.0 g/dL    Protein Total 7.0 6.8 - 8.8 g/dL    Alkaline Phosphatase 74 40 - 150 U/L    ALT 25 0 - 50 U/L    AST 17 0 - 45  U/L   Vitamin D Deficiency   Result Value Ref Range    Vitamin D Deficiency screening 33 20 - 75 ug/L      Comment:      Season, race, dietary intake, and treatment affect the concentration of   25-hydroxy-Vitamin D. Values may decrease during winter months and increase   during summer months. Values 20-29 ug/L may indicate Vitamin D insufficiency   and values <20 ug/L may indicate Vitamin D deficiency.  Vitamin D determination is routinely performed by an immunoassay specific for   25 hydroxyvitamin D3.  If an individual is on vitamin D2 (ergocalciferol)   supplementation, please specify 25 OH vitamin D2 and D3 level determination by   LCMSMS test [id number].         If you have any questions or concerns, please call the clinic at the number listed above.       Sincerely,        Arti Allen MD

## 2019-08-02 LAB
ALBUMIN SERPL-MCNC: 3.6 G/DL (ref 3.4–5)
ALP SERPL-CCNC: 74 U/L (ref 40–150)
ALT SERPL W P-5'-P-CCNC: 25 U/L (ref 0–50)
ANION GAP SERPL CALCULATED.3IONS-SCNC: 6 MMOL/L (ref 3–14)
AST SERPL W P-5'-P-CCNC: 17 U/L (ref 0–45)
BILIRUB SERPL-MCNC: 0.2 MG/DL (ref 0.2–1.3)
BUN SERPL-MCNC: 13 MG/DL (ref 7–30)
CALCIUM SERPL-MCNC: 8.6 MG/DL (ref 8.5–10.1)
CHLORIDE SERPL-SCNC: 109 MMOL/L (ref 94–109)
CO2 SERPL-SCNC: 25 MMOL/L (ref 20–32)
CREAT SERPL-MCNC: 0.78 MG/DL (ref 0.52–1.04)
DEPRECATED CALCIDIOL+CALCIFEROL SERPL-MC: 33 UG/L (ref 20–75)
GFR SERPL CREATININE-BSD FRML MDRD: 84 ML/MIN/{1.73_M2}
GLUCOSE SERPL-MCNC: 112 MG/DL (ref 70–99)
POTASSIUM SERPL-SCNC: 4.1 MMOL/L (ref 3.4–5.3)
PROT SERPL-MCNC: 7 G/DL (ref 6.8–8.8)
SODIUM SERPL-SCNC: 140 MMOL/L (ref 133–144)
TSH SERPL DL<=0.005 MIU/L-ACNC: 0.94 MU/L (ref 0.4–4)

## 2019-08-02 ASSESSMENT — PATIENT HEALTH QUESTIONNAIRE - PHQ9: SUM OF ALL RESPONSES TO PHQ QUESTIONS 1-9: 7

## 2019-08-15 ENCOUNTER — ANCILLARY PROCEDURE (OUTPATIENT)
Dept: MAMMOGRAPHY | Facility: CLINIC | Age: 59
End: 2019-08-15
Attending: FAMILY MEDICINE
Payer: COMMERCIAL

## 2019-08-15 DIAGNOSIS — Z00.00 ROUTINE GENERAL MEDICAL EXAMINATION AT A HEALTH CARE FACILITY: ICD-10-CM

## 2019-08-15 PROCEDURE — 77067 SCR MAMMO BI INCL CAD: CPT | Mod: TC

## 2020-01-28 ENCOUNTER — OFFICE VISIT (OUTPATIENT)
Dept: FAMILY MEDICINE | Facility: CLINIC | Age: 60
End: 2020-01-28
Payer: COMMERCIAL

## 2020-01-28 VITALS
BODY MASS INDEX: 22.93 KG/M2 | DIASTOLIC BLOOD PRESSURE: 64 MMHG | SYSTOLIC BLOOD PRESSURE: 101 MMHG | OXYGEN SATURATION: 99 % | HEIGHT: 63 IN | TEMPERATURE: 98.1 F | WEIGHT: 129.4 LBS | RESPIRATION RATE: 16 BRPM | HEART RATE: 74 BPM

## 2020-01-28 DIAGNOSIS — Z23 NEED FOR PROPHYLACTIC VACCINATION AND INOCULATION AGAINST INFLUENZA: ICD-10-CM

## 2020-01-28 DIAGNOSIS — F41.9 ANXIETY: ICD-10-CM

## 2020-01-28 DIAGNOSIS — Z29.9 ENCOUNTER FOR PREVENTIVE MEASURE: ICD-10-CM

## 2020-01-28 DIAGNOSIS — R20.8 DYSESTHESIA: Primary | ICD-10-CM

## 2020-01-28 DIAGNOSIS — J30.2 SEASONAL ALLERGIES: ICD-10-CM

## 2020-01-28 DIAGNOSIS — H69.91 DYSFUNCTION OF RIGHT EUSTACHIAN TUBE: ICD-10-CM

## 2020-01-28 DIAGNOSIS — M54.2 CERVICALGIA: ICD-10-CM

## 2020-01-28 PROBLEM — J30.89 NON-SEASONAL ALLERGIC RHINITIS: Status: ACTIVE | Noted: 2018-09-14

## 2020-01-28 LAB
FOLATE SERPL-MCNC: 27 NG/ML
VIT B12 SERPL-MCNC: 778 PG/ML (ref 193–986)

## 2020-01-28 PROCEDURE — 82607 VITAMIN B-12: CPT | Performed by: FAMILY MEDICINE

## 2020-01-28 PROCEDURE — 87389 HIV-1 AG W/HIV-1&-2 AB AG IA: CPT | Performed by: FAMILY MEDICINE

## 2020-01-28 PROCEDURE — 99214 OFFICE O/P EST MOD 30 MIN: CPT | Mod: 25 | Performed by: FAMILY MEDICINE

## 2020-01-28 PROCEDURE — 90471 IMMUNIZATION ADMIN: CPT | Performed by: FAMILY MEDICINE

## 2020-01-28 PROCEDURE — 36415 COLL VENOUS BLD VENIPUNCTURE: CPT | Performed by: FAMILY MEDICINE

## 2020-01-28 PROCEDURE — 90682 RIV4 VACC RECOMBINANT DNA IM: CPT | Performed by: FAMILY MEDICINE

## 2020-01-28 PROCEDURE — 82746 ASSAY OF FOLIC ACID SERUM: CPT | Performed by: FAMILY MEDICINE

## 2020-01-28 PROCEDURE — 86780 TREPONEMA PALLIDUM: CPT | Performed by: FAMILY MEDICINE

## 2020-01-28 PROCEDURE — 84443 ASSAY THYROID STIM HORMONE: CPT | Performed by: FAMILY MEDICINE

## 2020-01-28 RX ORDER — IPRATROPIUM BROMIDE 42 UG/1
2 SPRAY, METERED NASAL 4 TIMES DAILY
Qty: 15 ML | Refills: 11 | Status: SHIPPED | OUTPATIENT
Start: 2020-01-28 | End: 2021-02-04

## 2020-01-28 RX ORDER — BUPROPION HYDROCHLORIDE 150 MG/1
150 TABLET ORAL EVERY MORNING
Qty: 30 TABLET | Refills: 1 | Status: SHIPPED | OUTPATIENT
Start: 2020-01-28 | End: 2020-03-02

## 2020-01-28 RX ORDER — FLUTICASONE PROPIONATE 50 MCG
1 SPRAY, SUSPENSION (ML) NASAL DAILY
Qty: 16 G | Refills: 11 | Status: SHIPPED | OUTPATIENT
Start: 2020-01-28 | End: 2021-02-04

## 2020-01-28 ASSESSMENT — ANXIETY QUESTIONNAIRES
2. NOT BEING ABLE TO STOP OR CONTROL WORRYING: NOT AT ALL
3. WORRYING TOO MUCH ABOUT DIFFERENT THINGS: NOT AT ALL
7. FEELING AFRAID AS IF SOMETHING AWFUL MIGHT HAPPEN: NOT AT ALL
1. FEELING NERVOUS, ANXIOUS, OR ON EDGE: NOT AT ALL
6. BECOMING EASILY ANNOYED OR IRRITABLE: NOT AT ALL
GAD7 TOTAL SCORE: 0
5. BEING SO RESTLESS THAT IT IS HARD TO SIT STILL: NOT AT ALL
IF YOU CHECKED OFF ANY PROBLEMS ON THIS QUESTIONNAIRE, HOW DIFFICULT HAVE THESE PROBLEMS MADE IT FOR YOU TO DO YOUR WORK, TAKE CARE OF THINGS AT HOME, OR GET ALONG WITH OTHER PEOPLE: NOT DIFFICULT AT ALL

## 2020-01-28 ASSESSMENT — MIFFLIN-ST. JEOR: SCORE: 1133.14

## 2020-01-28 ASSESSMENT — PATIENT HEALTH QUESTIONNAIRE - PHQ9
5. POOR APPETITE OR OVEREATING: NOT AT ALL
SUM OF ALL RESPONSES TO PHQ QUESTIONS 1-9: 0

## 2020-01-28 NOTE — LETTER
January 30, 2020      Mary Carbajal  [address]  [facility] [address]        Dear ,      We are writing to inform you of your test results.  Tests are all normal. GREAT!     Resulted Orders   HIV Screening   Result Value Ref Range    HIV Antigen Antibody Combo Nonreactive NR^Nonreactive          Comment:      HIV-1 p24 Ag & HIV-1/HIV-2 Ab Not Detected   Vitamin B12   Result Value Ref Range    Vitamin B12 778 193 - 986 pg/mL   Folate   Result Value Ref Range    Folate 27.0 >5.4 ng/mL   TSH with free T4 reflex   Result Value Ref Range    TSH 1.22 0.40 - 4.00 mU/L   Treponema Abs w Reflex to RPR and Titer   Result Value Ref Range    Treponema Antibodies Nonreactive NR^Nonreactive       If you have any questions or concerns, please call the clinic at the number listed above.       Sincerely,        David Bustos MD

## 2020-01-28 NOTE — PROGRESS NOTES
Subjective     Mary Carbajal is a 59 year old female who presents to clinic today for the following health issues:    HPI   Joint Pain    Onset: X2 months    Description:   Location: right elbow and left wrist. Patients right ear has pressure. Also neck is hurting  Character: Dull ache and Burning    Intensity: severe    Progression of Symptoms: worse    Accompanying Signs & Symptoms:  Other symptoms: none    History:   Previous similar pain: no       Precipitating factors:   Trauma or overuse: no     Alleviating factors:  Improved by: nothing and Ibuprofen makes the pain a little better but does not take it away.      Patient would like flu shot today    Patient reports hypoesthesia of hands when driving the car long time.  When sitting in the car, her feet also become numb.  All painless.  Perhaps 2 months duration.  In addition, if she rests her right elbow on a hard surface, she develops hypoesthesia in her hand.  All of this resolves with shaking her extremities.    She also reports discomfort in her neck episodic.  Last few weeks.  No therapies tried    Her right ear seems plugged.  She continues with Flonase.  In discussing her use, she is shooting it straight up into her nose  Past Medical History:   Diagnosis Date     Anxiety 1/29/2020     CARDIOVASCULAR SCREENING; LDL GOAL LESS THAN 160 10/31/2010     Cervicalgia 9/14/2018     Chronic allergic rhinitis due to other allergic trigger, unspecified seasonality 9/14/2018     Constipation, unspecified constipation type 9/14/2018     Family history of diabetes mellitus 10/28/2013     GERD (gastroesophageal reflux disease) 3/17/2011     History of neck pain 1/19/2019     Hx of abdominal pain 1/19/2019     Insomnia, unspecified type 10/12/2018     Mild single current episode of major depressive disorder (H) 9/14/2018     Non-seasonal allergic rhinitis 9/14/2018     Skin macule 10/12/2018     Vaginal atrophy 10/8/2015       Past Surgical History:   Procedure  "Laterality Date     COLONOSCOPY N/A 3/15/2019    Procedure: COLONOSCOPY & EGD;  Surgeon: Orlin Chaudhry MD;  Location: [facility]     ESOPHAGOSCOPY, GASTROSCOPY, DUODENOSCOPY (EGD), COMBINED N/A 3/15/2019    Procedure: ESOPHAGOSCOPY, GASTROSCOPY, DUODENOSCOPY (EGD) ([facility]);  Surgeon: Orlin Chaudhry MD;  Location: [facility]     HYSTERECTOMY, VAGINAL       LAPAROSCOPIC APPENDECTOMY         Family History   Problem Relation Age of Onset     Diabetes Mother      Diabetes Brother      Colorectal Cancer Father      Diabetes Brother      Diabetes Brother      Colon Cancer No family hx of        Social History     Tobacco Use     Smoking status: Never Smoker     Smokeless tobacco: Never Used   Substance Use Topics     Alcohol use: No     Alcohol/week: 0.0 standard drinks     Frequency: Never     Drinks per session: Patient refused   She has recently lost a job, and gained another    Reviewed and updated as needed this visit by Provider         Review of Systems   No rash.  She denies anxiety or stress      Objective    /64 (BP Location: Right arm, Patient Position: Sitting, Cuff Size: Adult Regular)   Pulse 74   Temp 98.1  F (36.7  C) (Oral)   Resp 16   Ht 1.588 m (5' 2.5\")   Wt 58.7 kg (129 lb 6.4 oz)   LMP  (LMP Unknown)   SpO2 99%   Breastfeeding No   BMI 23.29 kg/m    Body mass index is 23.29 kg/m .  Physical Exam   Upper and lower extremity reflexes are intact and symmetric.  Babinski's are downgoing.   neck range of motion is full            Assessment & Plan   Problem List Items Addressed This Visit        Nervous and Auditory    Cervicalgia (Chronic)     Vague, exam reassuring.  Trial of therapy.  Suspect anxiety         Relevant Medications    buPROPion (WELLBUTRIN XL) 150 MG 24 hr tablet    Dysesthesia - Primary     2 distributions.  Most prominent acral.  Less so right ulnar distribution when resting elbow on the surface for long periods.  The latter pathophysiology discussed, nerve compression dose " advised.  Surgical options reviewed.  Serologic evaluation for the former.  Most likely anxiety as cause.  Discussed, treat.  Discussed further evaluation if unresponsive.         Relevant Medications    buPROPion (WELLBUTRIN XL) 150 MG 24 hr tablet    Other Relevant Orders    Vitamin B12 (Completed)    Folate (Completed)    TSH with free T4 reflex (Completed)    Treponema Abs w Reflex to RPR and Titer (Completed)    Dysfunction of right eustachian tube     Add Atrovent.  To ENT if unresponsive         Relevant Medications    fluticasone (FLONASE) 50 MCG/ACT nasal spray    ipratropium (ATROVENT) 0.06 % nasal spray       Other    Seasonal allergies     On Flonase.  Used improperly.  Instructed in use         Relevant Medications    fluticasone (FLONASE) 50 MCG/ACT nasal spray    ipratropium (ATROVENT) 0.06 % nasal spray    Anxiety     Loss of job, gain of new job in the last year already high score.  Discussed.  Trial of therapy         Relevant Medications    buPROPion (WELLBUTRIN XL) 150 MG 24 hr tablet      Other Visit Diagnoses     Encounter for preventive measure        Relevant Orders    HIV Screening (Completed)    Need for prophylactic vaccination and inoculation against influenza        Relevant Orders    INFLUENZA QUAD, RECOMBINANT, P-FREE (RIV4) (FLUBLOCK) [56203] (Completed)    VACCINE ADMINISTRATION, INITIAL (Completed)       Universal HIV testing introduced, rationale reviewed, all questions answered, permission granted to test          Return in about 6 weeks (around 3/10/2020).    David Bustos MD  [facility]

## 2020-01-28 NOTE — PROGRESS NOTES
"Paulo Carbajal is a 59 year old female who presents to clinic today for the following health issues:    HPI   Neck Pain  Onset: ***    Description:   Location: ***  Radiation: {.:768318::\"none\"}    Intensity: {.:943213}    Progression of Symptoms:  {.:990784}    Accompanying Signs & Symptoms:  Burning, prickly sensation (paresthesias) in arm(s): { :662579}  Numbness in arm(s): { :393537}  Weakness in arm(s):  { :050546}  Fever: { :000658}  Headache: { :512111}  Nausea and/or vomiting: { :670136}    History:   Trauma: { :818528}  Previous neck pain: { :373898}  Previous surgery or injections: { :286493}  Previous Imaging (MRI,X ray): { :551810}    Precipitating factors:   Does movement increase the pain:  { :206187}    Alleviating factors:  ***    Therapies Tried and outcome:  ***  {additonal problems for provider to add (Optional):491842}    {HIST REVIEW/ LINKS 2 (Optional):016821}    Reviewed and updated as needed this visit by Provider         Review of Systems   {ROS COMP (Optional):897817}      Objective    LMP  (LMP Unknown)   There is no height or weight on file to calculate BMI.  Physical Exam   {Exam List (Optional):519632}    {Diagnostic Test Results (Optional):282570::\"Diagnostic Test Results:\",\"Labs reviewed in Epic\"}        {PROVIDER CHARTING PREFERENCE:328170}    "

## 2020-01-29 PROBLEM — M54.2 CERVICALGIA: Chronic | Status: ACTIVE | Noted: 2018-09-14

## 2020-01-29 PROBLEM — R20.8 DYSESTHESIA: Status: ACTIVE | Noted: 2020-01-29

## 2020-01-29 PROBLEM — F41.9 ANXIETY: Status: ACTIVE | Noted: 2020-01-29

## 2020-01-29 PROBLEM — H69.91 DYSFUNCTION OF RIGHT EUSTACHIAN TUBE: Status: ACTIVE | Noted: 2020-01-29

## 2020-01-29 LAB
HIV 1+2 AB+HIV1 P24 AG SERPL QL IA: NONREACTIVE
T PALLIDUM AB SER QL: NONREACTIVE
TSH SERPL DL<=0.005 MIU/L-ACNC: 1.22 MU/L (ref 0.4–4)

## 2020-01-29 ASSESSMENT — ANXIETY QUESTIONNAIRES: GAD7 TOTAL SCORE: 0

## 2020-01-30 NOTE — ASSESSMENT & PLAN NOTE
2 distributions.  Most prominent acral.  Less so right ulnar distribution when resting elbow on the surface for long periods.  The latter pathophysiology discussed, nerve compression dose advised.  Surgical options reviewed.  Serologic evaluation for the former.  Most likely anxiety as cause.  Discussed, treat.  Discussed further evaluation if unresponsive.

## 2020-02-06 DIAGNOSIS — R10.13 ABDOMINAL PAIN, EPIGASTRIC: ICD-10-CM

## 2020-02-06 NOTE — TELEPHONE ENCOUNTER
"Prescription approved per [facility], [facility] or [facility] refill protocol.  Lisset YI - Registered Nurse  [facility]  Acute and Diagnostic Services            Requested Prescriptions   Pending Prescriptions Disp Refills     ranitidine (ZANTAC) 300 MG tablet [Pharmacy Med Name: RANITIDINE 300 MG TABLET] 45 tablet 3     Sig: TAKE 0.5 TABLETS (150 MG) BY MOUTH AT BEDTIME       H2 Blockers Protocol Passed - 2/6/2020  4:44 AM        Passed - Patient is age 12 or older        Passed - Recent (12 mo) or future (30 days) visit within the authorizing provider's specialty     Patient has had an office visit with the authorizing provider or a provider within the authorizing providers department within the previous 12 mos or has a future within next 30 days. See \"Patient Info\" tab in inbasket, or \"Choose Columns\" in Meds & Orders section of the refill encounter.              Passed - Medication is active on med list          "

## 2020-02-28 NOTE — PROGRESS NOTES
"Pre-Visit Planning     Future Appointments   Date Time Provider Department Center   3/2/2020  1:45 PM David Bustos MD [facility]   3/2/2020  1:45 PM [place] [facility]     Arrival Time for this Appointment:  1:25 PM   Appointment Notes for this encounter:   medication follow up     Questionnaires Reviewed/Assigned  No additional questionnaires are needed      Patient preferred phone number: [phone]    Spoke to patient via phone. Patient does not have additional questions or concerns.        Visit is not preventive.    Health Maintenance Due   Topic Date Due     ADVANCE CARE PLANNING  1960     ZOSTER IMMUNIZATION (1 of 2) 02/06/2012     Patient is due for:  Pt Needed to get off the phone quickly.  see above    MyChart  Patient is not active on [employer]. Encouraged 3dimt activation.  no    Questionnaire Review   no    Call Summary  \"Thank you for your time today.  If anything comes up before your appointment, please feel free to contact us at [phone].\"  "

## 2020-03-02 ENCOUNTER — OFFICE VISIT (OUTPATIENT)
Dept: FAMILY MEDICINE | Facility: CLINIC | Age: 60
End: 2020-03-02
Payer: COMMERCIAL

## 2020-03-02 VITALS
WEIGHT: 128 LBS | DIASTOLIC BLOOD PRESSURE: 78 MMHG | TEMPERATURE: 98.2 F | BODY MASS INDEX: 23.55 KG/M2 | RESPIRATION RATE: 16 BRPM | HEIGHT: 62 IN | HEART RATE: 85 BPM | OXYGEN SATURATION: 98 % | SYSTOLIC BLOOD PRESSURE: 110 MMHG

## 2020-03-02 DIAGNOSIS — Z23 NEED FOR SHINGLES VACCINE: ICD-10-CM

## 2020-03-02 DIAGNOSIS — M54.2 CERVICALGIA: Primary | ICD-10-CM

## 2020-03-02 DIAGNOSIS — R20.8 DYSESTHESIA: ICD-10-CM

## 2020-03-02 DIAGNOSIS — H69.91 DYSFUNCTION OF RIGHT EUSTACHIAN TUBE: ICD-10-CM

## 2020-03-02 DIAGNOSIS — F41.9 ANXIETY: ICD-10-CM

## 2020-03-02 PROCEDURE — 99214 OFFICE O/P EST MOD 30 MIN: CPT | Performed by: FAMILY MEDICINE

## 2020-03-02 RX ORDER — BUPROPION HYDROCHLORIDE 300 MG/1
300 TABLET ORAL EVERY MORNING
Qty: 90 TABLET | Refills: 1 | Status: SHIPPED | OUTPATIENT
Start: 2020-03-02 | End: 2020-04-27

## 2020-03-02 ASSESSMENT — ENCOUNTER SYMPTOMS
CONSTITUTIONAL NEGATIVE: 1
NEUROLOGICAL NEGATIVE: 1

## 2020-03-02 ASSESSMENT — MIFFLIN-ST. JEOR: SCORE: 1105.91

## 2020-03-02 NOTE — PROGRESS NOTES
Subjective     Mary Carbajal is a 60 year old female who presents to clinic today for the following health issues:        HPI   Joint Pain    Onset: Follow up     Description:   Location: right shoulder  Character: Burning    Intensity: mild    Progression of Symptoms: better    Accompanying Signs & Symptoms:  Other symptoms: none    History:   Previous similar pain: no       Precipitating factors:   Trauma or overuse: no     Alleviating factors:  Improved by: nothing    Therapies Tried and outcome:           Past Medical History:   Diagnosis Date     Anxiety 1/29/2020     CARDIOVASCULAR SCREENING; LDL GOAL LESS THAN 160 10/31/2010     Cervicalgia 9/14/2018     Chronic allergic rhinitis due to other allergic trigger, unspecified seasonality 9/14/2018     Constipation, unspecified constipation type 9/14/2018     Family history of diabetes mellitus 10/28/2013     GERD (gastroesophageal reflux disease) 3/17/2011     History of neck pain 1/19/2019     Hx of abdominal pain 1/19/2019     Insomnia, unspecified type 10/12/2018     Mild single current episode of major depressive disorder (H) 9/14/2018     Non-seasonal allergic rhinitis 9/14/2018     Skin macule 10/12/2018     Vaginal atrophy 10/8/2015       Past Surgical History:   Procedure Laterality Date     COLONOSCOPY N/A 3/15/2019    Procedure: COLONOSCOPY & EGD;  Surgeon: Orlin Chaudhry MD;  Location: [facility]     ESOPHAGOSCOPY, GASTROSCOPY, DUODENOSCOPY (EGD), COMBINED N/A 3/15/2019    Procedure: ESOPHAGOSCOPY, GASTROSCOPY, DUODENOSCOPY (EGD) ([facility]);  Surgeon: Orlin Chaudhry MD;  Location: [facility]     HYSTERECTOMY, VAGINAL       LAPAROSCOPIC APPENDECTOMY         Family History   Problem Relation Age of Onset     Diabetes Mother      Diabetes Brother      Colorectal Cancer Father      Diabetes Brother      Diabetes Brother      Colon Cancer No family hx of        Social History     Tobacco Use     Smoking status: Never Smoker     Smokeless tobacco: Never Used  "  Substance Use Topics     Alcohol use: No     Alcohol/week: 0.0 standard drinks     Frequency: Never     Drinks per session: Patient refused   Cervicalgia  (primary encounter diagnosis) almost completely resolved with bupropion, she has been out 3 days.  Right-sided neck pain has returned  Dysesthesia resolved with bupropion  Anxiety improved with medicine  Need for shingles vaccine offered  Dysfunction of right eustachian tube symptoms resolved        Reviewed and updated as needed this visit by Provider         Review of Systems   Constitutional: Negative.    Neurological: Negative.             Objective    /78 (BP Location: Right arm, Patient Position: Chair, Cuff Size: Adult Regular)   Pulse 85   Temp 98.2  F (36.8  C) (Oral)   Resp 16   Ht 1.562 m (5' 1.5\")   Wt 58.1 kg (128 lb)   LMP  (LMP Unknown)   SpO2 98%   BMI 23.79 kg/m    Body mass index is 23.79 kg/m .  Physical Exam  Neurological:      Cranial Nerves: No cranial nerve deficit.      Comments: Tender along the right trapezius.  Range of motion full no neurotension signs                    Assessment & Plan   Problem List Items Addressed This Visit        Nervous and Auditory    Cervicalgia - Primary (Chronic)     Improved with bupropion, worsened with cessation the last 3 days.  Increased dose         Relevant Medications    buPROPion (WELLBUTRIN XL) 300 MG 24 hr tablet    Dysesthesia     Resolved with bupropion.  Continue therapies         Relevant Medications    buPROPion (WELLBUTRIN XL) 300 MG 24 hr tablet    Dysfunction of right eustachian tube     Symptoms resolved nasal spray as needed            Other    Anxiety     Reduced with bupropion.  Continue. Up dose         Relevant Medications    buPROPion (WELLBUTRIN XL) 300 MG 24 hr tablet      Other Visit Diagnoses     Need for shingles vaccine                     Return in about 6 weeks (around 4/13/2020).    David Bustos MD  [facility] [place][note 2]"

## 2020-03-02 NOTE — PROGRESS NOTES
"Subjective     Mary Carbajal is a 60 year old female who presents to clinic today for the following health issues:          HPI   Depression Followup    How are you doing with your depression since your last visit? { :712494::\"No change\"}    Are you having other symptoms that might be associated with depression? { :980165}    Have you had a significant life event?  { :793230}     Are you feeling anxious or having panic attacks?   { :984147}    Do you have any concerns with your use of alcohol or other drugs? { :646256}    Social History     Tobacco Use     Smoking status: Never Smoker     Smokeless tobacco: Never Used   Substance Use Topics     Alcohol use: No     Alcohol/week: 0.0 standard drinks     Frequency: Never     Drinks per session: Patient refused     Drug use: No     PHQ 3/1/2019 8/1/2019 1/28/2020   PHQ-9 Total Score 0 7 0   Q9: Thoughts of better off dead/self-harm past 2 weeks Not at all Not at all Not at all     VIKTORIA-7 SCORE 1/18/2019 3/1/2019 1/28/2020   Total Score 1 (minimal anxiety) 0 (minimal anxiety) -   Total Score 1 0 0     {Last PHQ9 or GAD7 Responses (Optional):270216}  {PROVIDER ONLY Complete follow-up questions for patients who report suicide ideation  (Optional):183596}    Suicide Assessment Five-step Evaluation and Treatment (SAFE-T)      How many servings of fruits and vegetables do you eat daily?  { :748721}    On average, how many sweetened beverages do you drink each day (Examples: soda, juice, sweet tea, etc.  Do NOT count diet or artificially sweetened beverages)?   { 1-11:644455}    How many days per week do you exercise enough to make your heart beat faster? { :155833}    How many minutes a day do you exercise enough to make your heart beat faster? { :861271}    How many days per week do you miss taking your medication? {0-7 :299005}    {additonal problems for provider to add (Optional):262116}    {HIST REVIEW/ LINKS 2 (Optional):505924}    Reviewed and updated as needed this " "visit by Provider         Review of Systems   {ROS COMP (Optional):162873}      Objective    LMP  (LMP Unknown)   There is no height or weight on file to calculate BMI.  Physical Exam   {Exam List (Optional):438516}    {Diagnostic Test Results (Optional):843710::\"Diagnostic Test Results:\",\"Labs reviewed in Epic\"}        {PROVIDER CHARTING PREFERENCE:891929}    "

## 2020-03-04 ENCOUNTER — ALLIED HEALTH/NURSE VISIT (OUTPATIENT)
Dept: FAMILY MEDICINE | Facility: CLINIC | Age: 60
End: 2020-03-04
Payer: COMMERCIAL

## 2020-03-04 DIAGNOSIS — Z23 NEED FOR SHINGLES VACCINE: Primary | ICD-10-CM

## 2020-03-04 PROCEDURE — 90471 IMMUNIZATION ADMIN: CPT

## 2020-03-04 PROCEDURE — 90750 HZV VACC RECOMBINANT IM: CPT

## 2020-03-04 PROCEDURE — 99207 ZZC NO CHARGE NURSE ONLY: CPT

## 2020-04-15 ENCOUNTER — APPOINTMENT (OUTPATIENT)
Dept: INTERPRETER SERVICES | Facility: CLINIC | Age: 60
End: 2020-04-15
Payer: COMMERCIAL

## 2020-04-27 ENCOUNTER — VIRTUAL VISIT (OUTPATIENT)
Dept: FAMILY MEDICINE | Facility: CLINIC | Age: 60
End: 2020-04-27
Payer: COMMERCIAL

## 2020-04-27 DIAGNOSIS — R20.8 DYSESTHESIA: ICD-10-CM

## 2020-04-27 DIAGNOSIS — M54.2 CERVICALGIA: ICD-10-CM

## 2020-04-27 DIAGNOSIS — F41.9 ANXIETY: ICD-10-CM

## 2020-04-27 PROCEDURE — 99214 OFFICE O/P EST MOD 30 MIN: CPT | Mod: 95 | Performed by: FAMILY MEDICINE

## 2020-04-27 RX ORDER — BUPROPION HYDROCHLORIDE 300 MG/1
300 TABLET ORAL EVERY MORNING
Qty: 90 TABLET | Refills: 1 | Status: SHIPPED | OUTPATIENT
Start: 2020-04-27 | End: 2020-10-26

## 2020-04-27 RX ORDER — DESVENLAFAXINE 25 MG/1
25 TABLET, EXTENDED RELEASE ORAL DAILY
Qty: 90 TABLET | Refills: 1 | Status: SHIPPED | OUTPATIENT
Start: 2020-04-27 | End: 2020-10-26

## 2020-04-27 NOTE — PROGRESS NOTES
"Mary Carbajal is a 60 year old female who is being evaluated via a billable telephone visit.      The patient has been notified of following:     \"This telephone visit will be conducted via a call between you and your physician/provider. We have found that certain health care needs can be provided without the need for a physical exam.  This service lets us provide the care you need with a short phone conversation.  If a prescription is necessary we can send it directly to your pharmacy.  If lab work is needed we can place an order for that and you can then stop by our lab to have the test done at a later time.    Telephone visits are billed at different rates depending on your insurance coverage. During this emergency period, for some insurers they may be billed the same as an in-person visit.  Please reach out to your insurance provider with any questions.    If during the course of the call the physician/provider feels a telephone visit is not appropriate, you will not be charged for this service.\"    Patient has given verbal consent for Telephone visit?  Yes    How would you like to obtain your AVS?     Subjective     Mary Carbajal is a 60 year old female who presents to clinic today for the following health issues:    HPI  Medication Followup of Wellbutrin     Taking Medication as prescribed: yes    Side Effects:  None    Medication Helping Symptoms:  yes        Patient reports without the aid of an  that her shoulder and neck symptoms are markedly but incompletely improved.     Past Medical History:   Diagnosis Date     Anxiety 1/29/2020     CARDIOVASCULAR SCREENING; LDL GOAL LESS THAN 160 10/31/2010     Cervicalgia 9/14/2018     Chronic allergic rhinitis due to other allergic trigger, unspecified seasonality 9/14/2018     Constipation, unspecified constipation type 9/14/2018     Family history of diabetes mellitus 10/28/2013     GERD (gastroesophageal reflux disease) 3/17/2011     History of " neck pain 1/19/2019     Hx of abdominal pain 1/19/2019     Insomnia, unspecified type 10/12/2018     Mild single current episode of major depressive disorder (H) 9/14/2018     Non-seasonal allergic rhinitis 9/14/2018     Skin macule 10/12/2018     Vaginal atrophy 10/8/2015       Past Surgical History:   Procedure Laterality Date     COLONOSCOPY N/A 3/15/2019    Procedure: COLONOSCOPY & EGD;  Surgeon: Orlin Chaudhry MD;  Location: [facility]     ESOPHAGOSCOPY, GASTROSCOPY, DUODENOSCOPY (EGD), COMBINED N/A 3/15/2019    Procedure: ESOPHAGOSCOPY, GASTROSCOPY, DUODENOSCOPY (EGD) ([facility]);  Surgeon: Orlin Chaudhry MD;  Location: [facility]     HYSTERECTOMY, VAGINAL       LAPAROSCOPIC APPENDECTOMY         Family History   Problem Relation Age of Onset     Diabetes Mother      Diabetes Brother      Colorectal Cancer Father      Diabetes Brother      Diabetes Brother      Colon Cancer No family hx of        Social History     Tobacco Use     Smoking status: Never Smoker     Smokeless tobacco: Never Used   Substance Use Topics     Alcohol use: No     Alcohol/week: 0.0 standard drinks     Frequency: Never     Drinks per session: Patient refused             Reviewed and updated as needed this visit by Provider  Allergies  Meds  Med Hx  Surg Hx         Review of Systems   No intolerance       Objective       Assessment/Plan:  Problem List Items Addressed This Visit        Nervous and Auditory    Cervicalgia (Chronic)     Improved.  Add alternate SNRI         Relevant Medications    buPROPion (WELLBUTRIN XL) 300 MG 24 hr tablet    desvenlafaxine succinate (PRISTIQ) 25 MG 24 hr tablet    Dysesthesia     Improved.  Add alternate SNRI         Relevant Medications    buPROPion (WELLBUTRIN XL) 300 MG 24 hr tablet    desvenlafaxine succinate (PRISTIQ) 25 MG 24 hr tablet       Other    Anxiety     Current therapies tolerated         Relevant Medications    buPROPion (WELLBUTRIN XL) 300 MG 24 hr tablet    desvenlafaxine succinate (PRISTIQ)  25 MG 24 hr tablet            Return in about 6 weeks (around 6/8/2020) for virtual visit.      Phone call duration:  6 minutes      David Bustos MD

## 2020-09-28 ENCOUNTER — ANCILLARY PROCEDURE (OUTPATIENT)
Dept: MAMMOGRAPHY | Facility: CLINIC | Age: 60
End: 2020-09-28
Attending: FAMILY MEDICINE
Payer: COMMERCIAL

## 2020-09-28 DIAGNOSIS — Z12.31 VISIT FOR SCREENING MAMMOGRAM: ICD-10-CM

## 2020-09-28 PROCEDURE — 77063 BREAST TOMOSYNTHESIS BI: CPT | Mod: TC

## 2020-09-28 PROCEDURE — 77067 SCR MAMMO BI INCL CAD: CPT | Mod: TC

## 2020-10-26 ENCOUNTER — OFFICE VISIT (OUTPATIENT)
Dept: FAMILY MEDICINE | Facility: CLINIC | Age: 60
End: 2020-10-26
Payer: COMMERCIAL

## 2020-10-26 VITALS
DIASTOLIC BLOOD PRESSURE: 82 MMHG | RESPIRATION RATE: 16 BRPM | TEMPERATURE: 98.6 F | WEIGHT: 125 LBS | HEART RATE: 68 BPM | HEIGHT: 62 IN | SYSTOLIC BLOOD PRESSURE: 120 MMHG | BODY MASS INDEX: 23 KG/M2

## 2020-10-26 DIAGNOSIS — H11.32 SUBCONJUNCTIVAL HEMORRHAGE OF LEFT EYE: ICD-10-CM

## 2020-10-26 DIAGNOSIS — Z00.00 ROUTINE GENERAL MEDICAL EXAMINATION AT A HEALTH CARE FACILITY: Primary | ICD-10-CM

## 2020-10-26 DIAGNOSIS — Z23 NEED FOR VACCINATION: ICD-10-CM

## 2020-10-26 DIAGNOSIS — F41.9 ANXIETY: ICD-10-CM

## 2020-10-26 LAB
ANION GAP SERPL CALCULATED.3IONS-SCNC: 4 MMOL/L (ref 3–14)
BUN SERPL-MCNC: 12 MG/DL (ref 7–30)
CALCIUM SERPL-MCNC: 9.2 MG/DL (ref 8.5–10.1)
CHLORIDE SERPL-SCNC: 109 MMOL/L (ref 94–109)
CHOLEST SERPL-MCNC: 163 MG/DL
CO2 SERPL-SCNC: 27 MMOL/L (ref 20–32)
CREAT SERPL-MCNC: 0.77 MG/DL (ref 0.52–1.04)
DEPRECATED CALCIDIOL+CALCIFEROL SERPL-MC: 65 UG/L (ref 20–75)
GFR SERPL CREATININE-BSD FRML MDRD: 85 ML/MIN/{1.73_M2}
GLUCOSE SERPL-MCNC: 91 MG/DL (ref 70–99)
HDLC SERPL-MCNC: 65 MG/DL
LDLC SERPL CALC-MCNC: 86 MG/DL
NONHDLC SERPL-MCNC: 98 MG/DL
POTASSIUM SERPL-SCNC: 4.1 MMOL/L (ref 3.4–5.3)
SODIUM SERPL-SCNC: 140 MMOL/L (ref 133–144)
TRIGL SERPL-MCNC: 61 MG/DL

## 2020-10-26 PROCEDURE — 90472 IMMUNIZATION ADMIN EACH ADD: CPT | Performed by: NURSE PRACTITIONER

## 2020-10-26 PROCEDURE — 80061 LIPID PANEL: CPT | Performed by: NURSE PRACTITIONER

## 2020-10-26 PROCEDURE — 80048 BASIC METABOLIC PNL TOTAL CA: CPT | Performed by: NURSE PRACTITIONER

## 2020-10-26 PROCEDURE — 36415 COLL VENOUS BLD VENIPUNCTURE: CPT | Performed by: NURSE PRACTITIONER

## 2020-10-26 PROCEDURE — 82306 VITAMIN D 25 HYDROXY: CPT | Performed by: NURSE PRACTITIONER

## 2020-10-26 PROCEDURE — 90471 IMMUNIZATION ADMIN: CPT | Performed by: NURSE PRACTITIONER

## 2020-10-26 PROCEDURE — 90682 RIV4 VACC RECOMBINANT DNA IM: CPT | Performed by: NURSE PRACTITIONER

## 2020-10-26 PROCEDURE — 90750 HZV VACC RECOMBINANT IM: CPT | Performed by: NURSE PRACTITIONER

## 2020-10-26 PROCEDURE — 96127 BRIEF EMOTIONAL/BEHAV ASSMT: CPT | Performed by: NURSE PRACTITIONER

## 2020-10-26 PROCEDURE — 99396 PREV VISIT EST AGE 40-64: CPT | Mod: 25 | Performed by: NURSE PRACTITIONER

## 2020-10-26 RX ORDER — BUPROPION HYDROCHLORIDE 150 MG/1
TABLET ORAL
COMMUNITY
Start: 2020-01-28 | End: 2020-10-26

## 2020-10-26 RX ORDER — DESVENLAFAXINE 25 MG/1
25 TABLET, EXTENDED RELEASE ORAL DAILY
Qty: 90 TABLET | Refills: 1 | Status: CANCELLED | OUTPATIENT
Start: 2020-10-26

## 2020-10-26 ASSESSMENT — ANXIETY QUESTIONNAIRES
3. WORRYING TOO MUCH ABOUT DIFFERENT THINGS: NOT AT ALL
6. BECOMING EASILY ANNOYED OR IRRITABLE: NOT AT ALL
5. BEING SO RESTLESS THAT IT IS HARD TO SIT STILL: NOT AT ALL
GAD7 TOTAL SCORE: 0
1. FEELING NERVOUS, ANXIOUS, OR ON EDGE: NOT AT ALL
2. NOT BEING ABLE TO STOP OR CONTROL WORRYING: NOT AT ALL
7. FEELING AFRAID AS IF SOMETHING AWFUL MIGHT HAPPEN: NOT AT ALL
IF YOU CHECKED OFF ANY PROBLEMS ON THIS QUESTIONNAIRE, HOW DIFFICULT HAVE THESE PROBLEMS MADE IT FOR YOU TO DO YOUR WORK, TAKE CARE OF THINGS AT HOME, OR GET ALONG WITH OTHER PEOPLE: NOT DIFFICULT AT ALL

## 2020-10-26 ASSESSMENT — MIFFLIN-ST. JEOR: SCORE: 1092.31

## 2020-10-26 ASSESSMENT — PATIENT HEALTH QUESTIONNAIRE - PHQ9
5. POOR APPETITE OR OVEREATING: NOT AT ALL
SUM OF ALL RESPONSES TO PHQ QUESTIONS 1-9: 1

## 2020-10-26 NOTE — PROGRESS NOTES
SUBJECTIVE:   CC: Mary Carbajal is an 60 year old woman who presents for preventive health visit.       Patient has been advised of split billing requirements and indicates understanding: Yes  Healthy Habits:    Do you get at least three servings of calcium containing foods daily (dairy, green leafy vegetables, etc.)? yes    Amount of exercise or daily activities, outside of work: 3 day(s) per week    Problems taking medications regularly No    Medication side effects: No    Have you had an eye exam in the past two years? yes    Do you see a dentist twice per year? yes    Do you have sleep apnea, excessive snoring or daytime drowsiness?no    Doing well; no complaints today.     Mammo done in Sept.        Today's PHQ-2 Score:   PHQ-2 ( 1999 [employer]) 10/26/2020 4/27/2020   Q1: Little interest or pleasure in doing things 0 1   Q2: Feeling down, depressed or hopeless 0 1   PHQ-2 Score 0 2   Q1: Little interest or pleasure in doing things - -   Q2: Feeling down, depressed or hopeless - -   PHQ-2 Score - -       Abuse: Current or Past(Physical, Sexual or Emotional)- No  Do you feel safe in your environment? Yes    Have you ever done Advance Care Planning? (For example, a Health Directive, POLST, or a discussion with a medical provider or your loved ones about your wishes): No, advance care planning information given to patient to review.  Patient plans to discuss their wishes with loved ones or provider.      Social History     Tobacco Use     Smoking status: Never Smoker     Smokeless tobacco: Never Used   Substance Use Topics     Alcohol use: No     Alcohol/week: 0.0 standard drinks     Frequency: Never     Drinks per session: Patient refused     If you drink alcohol do you typically have >3 drinks per day or >7 drinks per week? Not Applicable                     Reviewed orders with patient.  Reviewed health maintenance and updated orders accordingly - Yes  Labs reviewed in EPIC  BP Readings from Last 3 Encounters:    10/26/20 120/82   03/02/20 110/78   01/28/20 101/64    Wt Readings from Last 3 Encounters:   10/26/20 56.7 kg (125 lb)   03/02/20 58.1 kg (128 lb)   01/28/20 58.7 kg (129 lb 6.4 oz)                  Current Outpatient Medications   Medication Sig Dispense Refill     cholecalciferol 2000 units tablet Take 1 tablet by mouth daily 90 tablet 1     fluticasone (FLONASE) 50 MCG/ACT nasal spray Spray 1 spray into both nostrils daily 16 g 11     ipratropium (ATROVENT) 0.06 % nasal spray Spray 2 sprays into both nostrils 4 times daily 15 mL 11     ranitidine (ZANTAC) 300 MG tablet        Allergies   Allergen Reactions     Sinus & Allergy [Pseudoephedrine]      Gets sinuses from pollin        Mammogram Screening: Patient over age 50, mutual decision to screen reflected in health maintenance.    Pertinent mammograms are reviewed under the imaging tab.  History of abnormal Pap smear: NO - age 30-65 PAP every 5 years with negative HPV co-testing recommended  PAP / HPV Latest Ref Rng & Units 3/23/2018 10/8/2015 9/1/2015   PAP - NIL NIL UNSAT   HPV 16 DNA NEG:Negative Negative - -   HPV 18 DNA NEG:Negative Negative - -   OTHER HR HPV NEG:Negative Negative - -     Reviewed and updated as needed this visit by clinical staff  Tobacco  Allergies  Meds  Problems  Med Hx  Surg Hx  Fam Hx  Soc Hx          Reviewed and updated as needed this visit by Provider  Tobacco  Allergies  Meds  Problems  Med Hx  Surg Hx  Fam Hx         Past Medical History:   Diagnosis Date     Anxiety 1/29/2020     CARDIOVASCULAR SCREENING; LDL GOAL LESS THAN 160 10/31/2010     Cervicalgia 9/14/2018     Chronic allergic rhinitis due to other allergic trigger, unspecified seasonality 9/14/2018     Constipation, unspecified constipation type 9/14/2018     Family history of diabetes mellitus 10/28/2013     GERD (gastroesophageal reflux disease) 3/17/2011     History of neck pain 1/19/2019     Hx of abdominal pain 1/19/2019     Insomnia, unspecified  type 10/12/2018     Mild single current episode of major depressive disorder (H) 9/14/2018     Non-seasonal allergic rhinitis 9/14/2018     Skin macule 10/12/2018     Vaginal atrophy 10/8/2015      Past Surgical History:   Procedure Laterality Date     COLONOSCOPY N/A 3/15/2019    Procedure: COLONOSCOPY & EGD;  Surgeon: Orlin Chaudhry MD;  Location:  GI     ESOPHAGOSCOPY, GASTROSCOPY, DUODENOSCOPY (EGD), COMBINED N/A 3/15/2019    Procedure: ESOPHAGOSCOPY, GASTROSCOPY, DUODENOSCOPY (EGD) ([facility]);  Surgeon: Orlin Chaudhry MD;  Location: [facility]     HYSTERECTOMY, VAGINAL       LAPAROSCOPIC APPENDECTOMY       History of anxiety--had been on wellbutrin and pristiq.  Stopped both in March because she didn't feel like she needed the medication any longer.  Feels symptoms are well controlled off meds.    PHQ 8/1/2019 1/28/2020 10/26/2020   PHQ-9 Total Score 7 0 1   Q9: Thoughts of better off dead/self-harm past 2 weeks Not at all Not at all Not at all     VIKTORIA-7 SCORE 3/1/2019 1/28/2020 10/26/2020   Total Score 0 (minimal anxiety) - -   Total Score 0 0 0           ROS:  CONSTITUTIONAL: NEGATIVE for fever, chills, change in weight  INTEGUMENTARY/SKIN: NEGATIVE for worrisome rashes, moles or lesions  EYES: NEGATIVE for vision changes or irritation  ENT: NEGATIVE for ear, mouth and throat problems  RESP: NEGATIVE for significant cough or SOB  BREAST: NEGATIVE for masses, tenderness or discharge  CV: NEGATIVE for chest pain, palpitations or peripheral edema  GI: NEGATIVE for nausea, abdominal pain, heartburn, or change in bowel habits  : NEGATIVE for unusual urinary or vaginal symptoms. No vaginal bleeding.  MUSCULOSKELETAL: NEGATIVE for significant arthralgias or myalgia  NEURO: NEGATIVE for weakness, dizziness or paresthesias  PSYCHIATRIC: NEGATIVE for changes in mood or affect     OBJECTIVE:   /82 (BP Location: Right arm, Patient Position: Sitting, Cuff Size: Adult Regular)   Pulse 68   Temp 98.6  F (37  C)  "(Oral)   Resp 16   Ht 1.562 m (5' 1.5\")   Wt 56.7 kg (125 lb)   LMP  (LMP Unknown)   BMI 23.24 kg/m    EXAM:  GENERAL: healthy, alert and no distress  EYES: Eyes grossly normal to inspection, PERRL and conjunctivae and sclerae normal, subconjunctival hemorrhage L eye  HENT: ear canals and TM's normal, nose and mouth without ulcers or lesions  NECK: no adenopathy, no asymmetry, masses, or scars and thyroid normal to palpation  RESP: lungs clear to auscultation - no rales, rhonchi or wheezes  BREAST: normal without masses, tenderness or nipple discharge and no palpable axillary masses or adenopathy  CV: regular rate and rhythm, normal S1 S2, no S3 or S4, no murmur, click or rub, no peripheral edema and peripheral pulses strong  ABDOMEN: soft, nontender, no hepatosplenomegaly, no masses and bowel sounds normal  MS: no gross musculoskeletal defects noted, no edema  SKIN: no suspicious lesions or rashes  NEURO: Normal strength and tone, mentation intact and speech normal  PSYCH: mentation appears normal, affect normal/bright    Diagnostic Test Results:  Labs reviewed in Epic    ASSESSMENT/PLAN:   1. Routine general medical examination at a health care facility  Normal exam today.   - Basic metabolic panel  - Lipid panel reflex to direct LDL Fasting  - Vitamin D Deficiency    2. Need for vaccination  Administered today.   - ID VACCINE ADMINISTRATION, INITIAL  - ID VACCINE ADMINISTRATION, EACH ADDITIONAL  - ZOSTER VACCINE RECOMBINANT ADJUVANTED IM NJX  - C RIV4 (FLUBLOK) VACCINE RECOMBINANT DNA PRSRV ANTIBIO FREE, IM [[id number]]    3. Anxiety  No longer taking medications; controlled.     4. Subconjunctival hemorrhage of left eye  Denies eye pain, itching, drainage, tearing, and headaches.  No FB sensation.  If any worsening or not resolving in the next 1-2 weeks follow-up with eye doctor.         COUNSELING:   Reviewed preventive health counseling, as reflected in patient instructions       Regular exercise       Healthy " "diet/nutrition       Vision screening       Immunizations    Vaccinated for: Influenza and Zoster          Estimated body mass index is 23.24 kg/m  as calculated from the following:    Height as of this encounter: 1.562 m (5' 1.5\").    Weight as of this encounter: 56.7 kg (125 lb).        She reports that she has never smoked. She has never used smokeless tobacco.      Counseling Resources:  ATP IV Guidelines  Pooled Cohorts Equation Calculator  Breast Cancer Risk Calculator  BRCA-Related Cancer Risk Assessment: FHS-7 Tool  FRAX Risk Assessment  ICSI Preventive Guidelines  Dietary Guidelines for Americans, 2010  USDA's MyPlate  ASA Prophylaxis  Lung CA Screening    Gisele Paetl, DAWSON [facility]  "

## 2020-10-26 NOTE — PROGRESS NOTES
Prior to immunization administration, verified patients identity using patient s name and date of birth. Please see Immunization Activity for additional information.     Screening Questionnaire for Adult Immunization    Are you sick today?   No   Do you have allergies to medications, food, a vaccine component or latex?   No   Have you ever had a serious reaction after receiving a vaccination?   No   Do you have a long-term health problem with heart, lung, kidney, or metabolic disease (e.g., diabetes), asthma, a blood disorder, no spleen, complement component deficiency, a cochlear implant, or a spinal fluid leak?  Are you on long-term aspirin therapy?   No   Do you have cancer, leukemia, HIV/AIDS, or any other immune system problem?   No   Do you have a parent, brother, or sister with an immune system problem?   No   In the past 3 months, have you taken medications that affect  your immune system, such as prednisone, other steroids, or anticancer drugs; drugs for the treatment of rheumatoid arthritis, Crohn s disease, or psoriasis; or have you had radiation treatments?   No   Have you had a seizure, or a brain or other nervous system problem?   No   During the past year, have you received a transfusion of blood or blood    products, or been given immune (gamma) globulin or antiviral drug?   No   For women: Are you pregnant or is there a chance you could become       pregnant during the next month?   No   Have you received any vaccinations in the past 4 weeks?   No     Immunization questionnaire answers were all negative.        Per orders of Gisele Patel CNP, injection of Shingrix given by Ivy Callahan MA. Patient instructed to remain in clinic for 15 minutes afterwards, and to report any adverse reaction to me immediately.       Screening performed by Ivy Callahan MA on 10/26/2020 at 8:52 AM.

## 2020-10-26 NOTE — PATIENT INSTRUCTIONS
Preventive Health Recommendations  Female Ages 50 - 64    Yearly exam: See your health care provider every year in order to  o Review health changes.   o Discuss preventive care.    o Review your medicines if your doctor has prescribed any.      Get a Pap test every three years (unless you have an abnormal result and your provider advises testing more often).    If you get Pap tests with HPV test, you only need to test every 5 years, unless you have an abnormal result.     You do not need a Pap test if your uterus was removed (hysterectomy) and you have not had cancer.    You should be tested each year for STDs (sexually transmitted diseases) if you're at risk.     Have a mammogram every 1 to 2 years.    Have a colonoscopy at age 50, or have a yearly FIT test (stool test). These exams screen for colon cancer.      Have a cholesterol test every 5 years, or more often if advised.    Have a diabetes test (fasting glucose) every three years. If you are at risk for diabetes, you should have this test more often.     If you are at risk for osteoporosis (brittle bone disease), think about having a bone density scan (DEXA).    Shots: Get a flu shot each year. Get a tetanus shot every 10 years.    Nutrition:     Eat at least 5 servings of fruits and vegetables each day.    Eat whole-grain bread, whole-wheat pasta and brown rice instead of white grains and rice.    Get adequate Calcium and Vitamin D.     Lifestyle    Exercise at least 150 minutes a week (30 minutes a day, 5 days a week). This will help you control your weight and prevent disease.    Limit alcohol to one drink per day.    No smoking.     Wear sunscreen to prevent skin cancer.     See your dentist every six months for an exam and cleaning.    See your eye doctor every 1 to 2 years.  Patient Education     Subconjunctival Hemorrhage    A subconjunctival hemorrhage is when a blood vessel breaks open in the white of the eye. It causes a bright red patch in the  white of the eye. It is similar to a bruise on the skin. This type of hemorrhage is common. It can look quite alarming, but it is usually harmless.  Understanding the conjunctiva  The conjunctiva is the thin layer that covers the inside of the eyelids and the surface of the eye. It has many tiny blood vessels that bring oxygen and nutrients to the eye. The sclera is the white part of the eye that lies beneath the conjunctiva. Sometimes a blood vessel in the conjunctiva breaks open and bleeds. The blood then collects under the conjunctiva and turns part of the eye red. Over several weeks, your body then absorbs the blood.  What causes subconjunctival hemorrhage?  In many cases the cause isn t known. But some health conditions may make it more likely. These include:  Eye injury  Eye surgery  High blood pressure  Inflammation of the conjunctiva  Contact lens use  Diabetes  Arteriosclerosis  Tumor of the conjunctiva  Diseases that affect blood clotting  Violent sneezing, coughing, or vomiting  Certain medicines that can increase bleeding, such as aspirin  Pushing hard during childbirth  Straining during constipation  Symptoms of subconjunctival hemorrhage  The main symptom is a red patch on the eye. You may notice it after waking up in the morning. In most cases just one eye will have a hemorrhage. It usually happens once and then goes away. But some health conditions may cause repeat hemorrhages. You may feel like you have something in your eye, but this is not common. The hemorrhage shouldn t affect your eyesight or cause any pain. If you do have pain, you may have another type of problem with your eye.  Diagnosing subconjunctival hemorrhage  Your healthcare provider will ask about your health history. You may have a physical exam. This includes a basic eye exam. Your provider will make sure you don t have other causes of red eye that may need other treatment.  You will need to see an eye doctor (ophthalmologist) if  you have had an eye injury. This doctor might use a special lighted microscope to look closely at your eye. This helps show the doctor if the injury hurt the eye itself and not just its outer layer.  If this is not your first subconjunctival hemorrhage, your doctor may need to find the cause. For example, you may need blood tests to check for a blood clotting disorder.  Treatment for subconjunctival hemorrhage  In most cases you will not need treatment. The red patch will usually go away on its own in a few weeks. It will turn from red to brown and then yellow. There are no treatments to speed up this process. Your doctor may suggest you use a warm compress and artificial tears eye drops to help relieve some of the redness.  If your subconjunctival hemorrhage was caused by a health condition, that condition will be treated. For example, you may need a blood pressure medicine to treat high blood pressure.  When to call your healthcare provider  Call your healthcare provider right away if you have any of these:  Hemorrhage that doesn t go away in 2 to 3 weeks  Eye pain  Loss of eyesight  Another subconjunctival hemorrhage    Date Last Reviewed: 2/1/2017 2000-2019 [employer]. [address] [place] [address], [place] [address]. All rights reserved. This information is not intended as a substitute for professional medical care. Always follow your healthcare professional's instructions.

## 2020-10-27 ASSESSMENT — ANXIETY QUESTIONNAIRES: GAD7 TOTAL SCORE: 0

## 2020-11-17 ENCOUNTER — OFFICE VISIT (OUTPATIENT)
Dept: FAMILY MEDICINE | Facility: CLINIC | Age: 60
End: 2020-11-17
Payer: COMMERCIAL

## 2020-11-17 VITALS
RESPIRATION RATE: 16 BRPM | DIASTOLIC BLOOD PRESSURE: 67 MMHG | SYSTOLIC BLOOD PRESSURE: 130 MMHG | WEIGHT: 127 LBS | TEMPERATURE: 97.7 F | OXYGEN SATURATION: 98 % | HEART RATE: 69 BPM | BODY MASS INDEX: 23.61 KG/M2

## 2020-11-17 DIAGNOSIS — M54.50 ACUTE RIGHT-SIDED LOW BACK PAIN WITHOUT SCIATICA: Primary | ICD-10-CM

## 2020-11-17 LAB
ALBUMIN UR-MCNC: NEGATIVE MG/DL
APPEARANCE UR: CLEAR
BACTERIA #/AREA URNS HPF: ABNORMAL /HPF
BILIRUB UR QL STRIP: NEGATIVE
COLOR UR AUTO: YELLOW
GLUCOSE UR STRIP-MCNC: NEGATIVE MG/DL
HGB UR QL STRIP: ABNORMAL
KETONES UR STRIP-MCNC: NEGATIVE MG/DL
LEUKOCYTE ESTERASE UR QL STRIP: NEGATIVE
NITRATE UR QL: NEGATIVE
NON-SQ EPI CELLS #/AREA URNS LPF: ABNORMAL /LPF
PH UR STRIP: 5.5 PH (ref 5–7)
RBC #/AREA URNS AUTO: ABNORMAL /HPF
SOURCE: ABNORMAL
SP GR UR STRIP: 1.02 (ref 1–1.03)
SPECIMEN SOURCE: NORMAL
UROBILINOGEN UR STRIP-ACNC: 0.2 EU/DL (ref 0.2–1)
WBC #/AREA URNS AUTO: ABNORMAL /HPF
WET PREP SPEC: NORMAL

## 2020-11-17 PROCEDURE — 81001 URINALYSIS AUTO W/SCOPE: CPT | Performed by: PHYSICIAN ASSISTANT

## 2020-11-17 PROCEDURE — 87210 SMEAR WET MOUNT SALINE/INK: CPT | Performed by: PHYSICIAN ASSISTANT

## 2020-11-17 PROCEDURE — 99214 OFFICE O/P EST MOD 30 MIN: CPT | Performed by: PHYSICIAN ASSISTANT

## 2020-11-17 RX ORDER — CYCLOBENZAPRINE HCL 10 MG
10 TABLET ORAL 3 TIMES DAILY PRN
Qty: 30 TABLET | Refills: 0 | Status: SHIPPED | OUTPATIENT
Start: 2020-11-17 | End: 2021-02-04

## 2020-11-17 RX ORDER — TAMSULOSIN HYDROCHLORIDE 0.4 MG/1
0.4 CAPSULE ORAL DAILY
Qty: 30 CAPSULE | Refills: 0 | Status: SHIPPED | OUTPATIENT
Start: 2020-11-17 | End: 2021-02-04

## 2020-11-17 NOTE — PATIENT INSTRUCTIONS
Patient Education     Hematuria: causas posibles     Hay numerosos problemas que pueden causar la aparición de shakeel en la orina (hematuria). Puede que la shakeel se kinsey a simple vista (hematuria macroscópica) o quizás solo pueden verse células sanguíneas cuando se examina la orina con un microscopio (hematuria microscópica). A menudo no se encuentra la causa de la presencia de shakeel. [place] se conoce gaurav hematuria idiopática. Algunas de las causas más comunes de la presencia de shakeel en la orina pueden ser las siguientes:    Cálculos renales o vesicales. Estos son acumulaciones de daisy que se ramos en la orina. Los cálculos pueden aparecer en cualquier parte del tracto urinario, jeanne se ramos más a menudo en los riñones o la vejiga. Además de la shakeel en la orina, pueden causar dolor intenso.    Hiperplasia benigna de la próstata. [place] es un agrandamiento de la próstata. Les ocurre a los hombres con el paso de los años y puede dificultar el acto de orinar. A veces puede ocasionar la presencia de shakeel en la orina.    Infección de las vías urinarias. [place] se debe al crecimiento de bacterias en las vías urinarias y puede causar la presencia de shakeel en la orina. Otros síntomas pueden ser ardor o dolor al orinar. Quizás deba orinar seguido o con urgencia. Puede que también tenga fiebre.    El daño en el tracto urinario puede causar la presencia de shakeel en la orina. Ewelina daño puede deberse a un golpe o un accidente. También puede surgir debido al uso de jodi sonda urinaria. En ocasiones, el ejercicio muy enérgico puede irritar el tracto urinario y causar sangrado.    El cáncer puede afectar a cualquier parte del tracto urinario. A veces, el único síntoma de un tumor es el sangrado en el tracto urinario.  Entre otras causas de sangrado se encuentran las siguientes:    Infección en la próstata (prostatitis)    Uso de medicamentos anticoagulantes    Obstrucción de las vías urinarias    Inflamación o  enfermedad renal    Enfermedades quísticas del riñón    Anemia de células falciformes    Ejercicio vigoroso    Endometriosis    9543-0113 [employer]. [address] [place] [id number]. Todos los derechos reservados. Esta información no pretende sustituir la atención médica profesional. Sólo weldon médico puede diagnosticar y tratar un problema de mesha.           Patient Education     Los cálculos renales  Arin riñones son órganos con forma de frijol que ayudan a filtrar el exceso de sales, desechos y agua de weldon cuerpo. Debe beber suficiente agua todos los días para ayudar a eliminar farzana exceso de sales a través de la orina.      Qué son los cálculos renales?  Los cálculos renales (piedras en los riñones) están compuestos por sustancias químicas cristalizadas que se separan de la orina. Estos daisy se agrupan y ramos  piedras . Se ramos en el cáliz del riñón, y pueden quedarse en el riñón o pasar a las vías urinarias.    Por qué se ramos los cálculos renales?  Los cálculos renales pueden formarse por muchos motivos. Si no iker agua suficiente, por ejemplo, no tendrá orina suficiente para diluir las sustancias químicas. Entonces, esas sustancias químicas pueden formar daisy, los cuales pueden convertirse en piedras:    La pérdida de líquido (deshidratación) puede concentrar la orina y hacer que se formen piedras.    Hay ciertos alimentos que contienen grandes cantidades de las sustancias químicas que, en ocasiones, se cristalizan hasta formar piedras. Si come alimentos con alto contenido de carne o sal, eso puede hacer que se formen cálculos renales.    Las infecciones renales facilitan la formación de piedras al reducir el flujo de orina o cambiar el equilibrio ácido de weldon orina.    Antecedentes familiares. Si un integrante de weldon joseph tuvo cálculos renales, usted tiene más probabilidades de tener también.    La deficiencia de ciertas sustancias en la orina que ayudan a proteger contra  la formación de piedras también puede aumentar las probabilidades de que se formen piedras.   Dónde se ramos los cálculos?  Los cálculos comienzan a formarse en la parte del riñón que tiene forma de taza (el cáliz). Algunos se quedan en el cáliz y crecen allí, mientras que otros se desplazan a la pelvis renal o al uréter. Ahí pueden atascarse y bloquear el flujo de orina, con lo que ocasionan dolor.  Síntomas  Muchos cálculos renales pueden causar un dolor repentino e intenso y shakeel en la orina. En algunos casos pueden producir náuseas, deseo frecuente de orinar y escozor al hacerlo. Los síntomas suelen depender del tamaño de oh cálculos y del lugar donde se encuentren. La fiebre puede indicar la presencia de jodi infección grave. Llame a weldon proveedor de atención médica de inmediato si presenta fiebre.    [id number] [employer]. [address] [place] [address], [place] [address]. Todos los derechos reservados. Esta información no pretende sustituir la atención médica profesional. Sólo weldon médico puede diagnosticar y tratar un problema de mesha.

## 2020-11-17 NOTE — PROGRESS NOTES
Subjective     Mary Carbajal is a 60 year old female who presents to clinic today for the following health issues:    HPI         Back Pain  Onset/Duration: x3 week  Description:   Location of pain: low back right  Character of pain: sharp  Pain radiation: none  New numbness or weakness in legs, not attributed to pain: no   Intensity:  moderate  Progression of Symptoms: worsening  History:   Specific cause: none  Pain interferes with job: YES  History of back problems: no prior back problems  Any previous MRI or X-rays: None  Sees a specialist for back pain: No  Alleviating factors:   Improved by: NSAIDs    Precipitating factors:  Worsened by: Nothing  Therapies tried and outcome: NSAIDs    Accompanying Signs & Symptoms:  Risk of Fracture: None  Risk of Cauda Equina: None  Risk of Infection: None  Risk of Cancer: None  Risk of Ankylosing Spondylitis: Onset at age <35, male, AND morning back stiffness  no     Of note, patient states at beginning of symptoms, did have 1 day of painful urination. No symptoms since. No noted blood in urine. No known history of stones. States pain is constant and now worsening and waxes and wanes at times waking up at night. Drinks 6 glasses of water per day.     Patient also states it hurts to have intercourse.     Patient is sexually active with same partner for 23 years. No concerns for STIs. No fever or chills. No abdominal pain. No changes in urination frequency or bowel frequency or consistency.         Review of Systems   Constitutional, HEENT, cardiovascular, pulmonary, GI, , musculoskeletal, neuro, skin, endocrine and psych systems are negative, except as otherwise noted.      Objective    /67 (BP Location: Right arm, Patient Position: Chair, Cuff Size: Adult Regular)   Pulse 69   Temp 97.7  F (36.5  C) (Oral)   Resp 16   Wt 57.6 kg (127 lb)   LMP  (LMP Unknown)   SpO2 98%   Breastfeeding No   BMI 23.61 kg/m    Body mass index is 23.61 kg/m .  Physical Exam    GENERAL: healthy, alert and no distress  NECK: no adenopathy, no asymmetry, masses, or scars and thyroid normal to palpation  RESP: lungs clear to auscultation - no rales, rhonchi or wheezes  CV: regular rate and rhythm, normal S1 S2, no S3 or S4, no murmur, click or rub, no peripheral edema and peripheral pulses strong  ABDOMEN: soft, nontender, no hepatosplenomegaly, no masses and bowel sounds normal  BACK: right CVA tenderness, no paralumbar tenderness  Comprehensive back pain exam:  Tenderness of right flank, Range of motion not limited by pain, Lower extremity strength functional and equal on both sides, Lower extremity reflexes within normal limits bilaterally, Lower extremity sensation normal and equal on both sides and Straight leg raise negative bilaterally  PSYCH: mentation appears normal, affect normal/bright    Results for orders placed or performed in visit on 11/17/20 (from the past 24 hour(s))   *UA reflex to Microscopic and Culture ([place] and [facility] (except [place] and [place])    Specimen: Midstream Urine   Result Value Ref Range    Color Urine Yellow     Appearance Urine Clear     Glucose Urine Negative NEG^Negative mg/dL    Bilirubin Urine Negative NEG^Negative    Ketones Urine Negative NEG^Negative mg/dL    Specific Gravity Urine 1.020 1.003 - 1.035    Blood Urine Small (A) NEG^Negative    pH Urine 5.5 5.0 - 7.0 pH    Protein Albumin Urine Negative NEG^Negative mg/dL    Urobilinogen Urine 0.2 0.2 - 1.0 EU/dL    Nitrite Urine Negative NEG^Negative    Leukocyte Esterase Urine Negative NEG^Negative    Source Midstream Urine    Urine Microscopic   Result Value Ref Range    WBC Urine 0 - 5 OTO5^0 - 5 /HPF    RBC Urine 5-10 (A) OTO2^O - 2 /HPF    Squamous Epithelial /LPF Urine Few FEW^Few /LPF    Bacteria Urine Few (A) NEG^Negative /HPF   Wet prep    Specimen: Vagina   Result Value Ref Range    Specimen Description Vagina     Wet Prep No Trichomonas seen     Wet Prep No clue cells seen      Wet Prep No yeast seen     Wet Prep Few  WBC'S seen              Assessment & Plan     Acute right-sided low back pain without sciatica  Unclear of exact etiology. However, given slight hematuria on UA and acute flank pain, nephrolithiasis is possible. Wet prep negative. Considered sti, but patient denies concerns. No fever or abdominal/adnexal pain making PID unlikely. Given negative work up thus far with hematuria and flank pain, recommending ct abd pelvis without contrast for evaluation of stone given course length. flomax to be added to nsaid use and flexeril prn for possible msk etiology. If work up negative, will still have see urology given hematuria for possible cystoscopy and either renal ultrasound or CT urogram   - *UA reflex to Microscopic and Culture ([facility] (except [place] and [place])  - Urine Microscopic  - Wet prep  - CT Abdomen Pelvis w/o Contrast; Future  - tamsulosin (FLOMAX) 0.4 MG capsule; Take 1 capsule (0.4 mg) by mouth daily  - cyclobenzaprine (FLEXERIL) 10 MG tablet; Take 1 tablet (10 mg) by mouth 3 times daily as needed for muscle spasms  -Medication use and side effects discussed with the patient. Patient is in complete understanding and agreement with plan.           Return in about 11 months (around 10/17/2021) for Physical Exam, Lab Work.    Mikey Ulrich PA-C  [facility]

## 2020-11-25 ENCOUNTER — HOSPITAL ENCOUNTER (OUTPATIENT)
Dept: CT IMAGING | Facility: CLINIC | Age: 60
Discharge: HOME | End: 2020-11-25
Attending: PHYSICIAN ASSISTANT | Admitting: PHYSICIAN ASSISTANT
Payer: COMMERCIAL

## 2020-11-25 DIAGNOSIS — M54.50 ACUTE RIGHT-SIDED LOW BACK PAIN WITHOUT SCIATICA: ICD-10-CM

## 2020-11-25 PROCEDURE — 74176 CT ABD & PELVIS W/O CONTRAST: CPT

## 2020-11-27 ENCOUNTER — APPOINTMENT (OUTPATIENT)
Dept: INTERPRETER SERVICES | Facility: CLINIC | Age: 60
End: 2020-11-27
Payer: COMMERCIAL

## 2020-11-27 ENCOUNTER — NURSE TRIAGE (OUTPATIENT)
Dept: FAMILY MEDICINE | Facility: CLINIC | Age: 60
End: 2020-11-27

## 2020-11-27 DIAGNOSIS — R31.29 OTHER MICROSCOPIC HEMATURIA: Primary | ICD-10-CM

## 2020-11-27 NOTE — TELEPHONE ENCOUNTER
Called patient using "[email or web address]  Services.  Advised patient of below result note.  Still dealing with same symptoms.  States unable to take medicine given as helps but makes her very sleepy.  Just taking at night but drowsy in am and when has to go to work and dizzy when bends over.  Advised can try taking 1/2 tab and see if tolerates better.  No change in bowels.  Has a lump in rectum.  Does not know of blood in urine.  States was just told that here in clinic.  Please advise.  Call patient back with plan.  JOHNATHAN Verma, Mikey Hong PA-C   11/27/2020 12:08 PM CST      Please call patient.      Ct scan did not show any obvious stone causing her pain and blood in the urine.      They did see some possible inflammation in her colon but as long as she is not having any bowel changes or blood in her stool, no concerns at this time.      There was a cyst seen noted on her left kidney which has been seen before, but bigger in size. These typically are not a concerns.      Please inquire on her symptoms. Any change? Any bowel changes? If still having symptoms, would recommend seeing a urologist to evaluate for cause of blood in her urine. If having bowel changes, we might also consider a consult appt with GI.      -abbey martin, pac

## 2020-11-30 NOTE — TELEPHONE ENCOUNTER
Routed to TC, please call pt via Turkish interpretor and schedule appointment for rectal issue  Ai Landry RN, BSN  Message handled by CLINIC NURSE.

## 2020-11-30 NOTE — TELEPHONE ENCOUNTER
Given no bowel changes and persistent symptoms with microscopic blood present, would recommend consulting with Urology. Referral given.     -abbey martin, pac

## 2020-11-30 NOTE — TELEPHONE ENCOUNTER
Yes. This was not discussed nor examined at her last appt. Will need exam. Myself or extender appt.     -abbey martin, surjit

## 2020-11-30 NOTE — TELEPHONE ENCOUNTER
"Called pt via German interpretor, discussed at length, now pt wants to know what to do with lump by anus, see KF note, see triage note, no documentation that was discussed at recent visit, routed to ZB, please advise new issue, pt asking to make another appointment, please confirm plan, route to TC if f/u appointment needed for scheduling    Additional Information    Negative: Sounds like a life-threatening emergency to the triager    Negative: Diarrhea is the main symptom    Negative: Constipation is the main symptom (e.g., pain or discomfort caused by passage of hard BMs)    Negative: Blood in or on bowel movement is the main symptom    Negative: Sexual assault    Negative: Injury to rectum    Negative: Patient sounds very sick or weak to the triager    Negative: Severe rectal pain    Negative: Rectal pain or redness and fever > 100.5 F (38.1 C)    Negative: Acute onset rectal pain and constipation (straining with rectal pressure or fullness), which is not relieved by Sitz bath or suppository    Negative: MODERATE-SEVERE rectal pain (i.e., interferes with school, work, or sleep)    Negative: MODERATE-SEVERE rectal itching (i.e., interferes with school, work, or sleep)    Negative: Last bowel movement (BM) > 4 days ago    Negative: Rectal area looks infected (e.g., draining sore, spreading redness)    Negative: Rash of rectal area (e.g., open sore, painful tiny water blisters, unexplained bumps)    Negative: Caller is worried about a sexually transmitted disease (STD)    Negative: Home treatment > 3 days for rectal pain and not improved    Negative: Home treatment for > 3 days for rectal itching and not improved    Patient wants to be seen    Answer Assessment - Initial Assessment Questions  1. SYMPTOM:  \"What's the main symptom you're concerned about?\" (e.g., pain, itching, swelling, rash)      Itching skin, irritated, \"little ball next to anus\"  2. ONSET: \"When did the start?\"      2 weeks ago    3. RECTAL PAIN: " "\"Do you have any pain around your rectum?\" \"How bad is the pain?\"  (Scale 1-10; or mild, moderate, severe)    - MODERATE (4-7): interferes with normal activities or awakens from sleep, limping     4. RECTAL ITCHING: \"Do you have any itching in this area?\" \"How bad is the itching?\"  (Scale 1-10; or mild, moderate, severe)   - MILD - doesn't interfere with normal activities     5. CONSTIPATION: \"Do you have constipation?\" If so, \"How bad is it?\"      No  6. CAUSE: \"What do you think is causing the anus symptoms?\"       No   7. OTHER SYMPTOMS: \"Do you have any other symptoms?\"  (e.g., rectal bleeding, abdominal pain, vomiting, fever)      No   8. PREGNANCY: \"Is there any chance you are pregnant?\" \"When was your last menstrual period?\"      No    Protocols used: RECTAL SYMPTOMS-A-OH    Ai Landry RN, BSN  Message handled by CLINIC NURSE.      "

## 2020-12-01 ENCOUNTER — APPOINTMENT (OUTPATIENT)
Dept: INTERPRETER SERVICES | Facility: CLINIC | Age: 60
End: 2020-12-01
Payer: COMMERCIAL

## 2020-12-15 ENCOUNTER — VIRTUAL VISIT (OUTPATIENT)
Dept: UROLOGY | Facility: CLINIC | Age: 60
End: 2020-12-15
Payer: COMMERCIAL

## 2020-12-15 ENCOUNTER — ALLIED HEALTH/NURSE VISIT (OUTPATIENT)
Dept: INTERPRETER SERVICES | Facility: CLINIC | Age: 60
End: 2020-12-15

## 2020-12-15 VITALS — BODY MASS INDEX: 21.97 KG/M2 | HEIGHT: 63 IN | WEIGHT: 124 LBS

## 2020-12-15 DIAGNOSIS — R31.9 HEMATURIA, UNSPECIFIED TYPE: Primary | ICD-10-CM

## 2020-12-15 PROCEDURE — 99203 OFFICE O/P NEW LOW 30 MIN: CPT | Mod: 95 | Performed by: PHYSICIAN ASSISTANT

## 2020-12-15 PROCEDURE — T1013 SIGN LANG/ORAL INTERPRETER: HCPCS | Mod: U4 | Performed by: PHYSICIAN ASSISTANT

## 2020-12-15 ASSESSMENT — MIFFLIN-ST. JEOR: SCORE: 1111.59

## 2020-12-15 ASSESSMENT — PAIN SCALES - GENERAL: PAINLEVEL: NO PAIN (0)

## 2020-12-15 NOTE — PROGRESS NOTES
"Mary Carbajal is a 60 year old female who is being evaluated via a billable video visit.  ------------------------------------------------------  Pt denies gross hem.  Pt states she has an inflamed colon.  Pt denies hx of stones.  Pt needs Sen[place]e interurperter  Pt had vaginal US  pt sometimes has back pain  ------------------------------------------------------        Mary Carbajal is a 60 year old female who is being evaluated via a billable telephone visit.      The patient has been notified of following:     \"This telephone visit will be conducted via a call between you and your physician/provider. We have found that certain health care needs can be provided without the need for a physical exam.  This service lets us provide the care you need with a short phone conversation.  If a prescription is necessary we can send it directly to your pharmacy.  If lab work is needed we can place an order for that and you can then stop by our lab to have the test done at a later time.    Telephone visits are billed at different rates depending on your insurance coverage. During this emergency period, for some insurers they may be billed the same as an in-person visit.  Please reach out to your insurance provider with any questions.    If during the course of the call the physician/provider feels a telephone visit is not appropriate, you will not be charged for this service.\"    Patient has given verbal consent for Telephone visit?  Yes    What phone number would you like to be contacted at? cell    How would you like to obtain your AVS? Mail a copy    Phone call duration: 10 minutes    CC: Hematuria.    HPI: It is a pleasure to see Ms. Mary Carbajal, a 60 year old female, asked to be seen via billable phone visit in consultation by Al Ulrich PA-C for evaluation of micro hematuria.     The patient denies any gross hematuria.  Ms. Carbajal voids without difficulty.  She currently denies any dysuria, pyuria, hesitancy, " intermittency, feelings of incomplete emptying, or any recent history of urinary tract infections or stones.    CT notes renal cyst. Only one UA with micro hematuria.    The patient presents today with an  whom aids in obtaining the history and facilitating the visit.      Hematuria Risk Factors:  Age >40: Yes     Smoking history: no  Occupational exposure to chemicals or dyes (ie, benzenes, aromatic amines): no  History of urologic disorder or disease: no  History of irritative voiding symptoms: no  History of urinary tract infection: no  Analgesic abuse: no  History of pelvic irradiation: no    Past Medical History:   Diagnosis Date     Anxiety 1/29/2020     CARDIOVASCULAR SCREENING; LDL GOAL LESS THAN 160 10/31/2010     Cervicalgia 9/14/2018     Chronic allergic rhinitis due to other allergic trigger, unspecified seasonality 9/14/2018     Constipation, unspecified constipation type 9/14/2018     Family history of diabetes mellitus 10/28/2013     GERD (gastroesophageal reflux disease) 3/17/2011     History of neck pain 1/19/2019     Hx of abdominal pain 1/19/2019     Insomnia, unspecified type 10/12/2018     Mild single current episode of major depressive disorder (H) 9/14/2018     Non-seasonal allergic rhinitis 9/14/2018     Skin macule 10/12/2018     Vaginal atrophy 10/8/2015     Past Surgical History:   Procedure Laterality Date     COLONOSCOPY N/A 3/15/2019    Procedure: COLONOSCOPY & EGD;  Surgeon: Orlin Chaudhry MD;  Location:  GI     ESOPHAGOSCOPY, GASTROSCOPY, DUODENOSCOPY (EGD), COMBINED N/A 3/15/2019    Procedure: ESOPHAGOSCOPY, GASTROSCOPY, DUODENOSCOPY (EGD) ([facility]);  Surgeon: Orlin Chaudhry MD;  Location:  GI     HYSTERECTOMY, VAGINAL       LAPAROSCOPIC APPENDECTOMY       Current Outpatient Medications   Medication Sig Dispense Refill     cholecalciferol 2000 units tablet Take 1 tablet by mouth daily 90 tablet 1     cyclobenzaprine (FLEXERIL) 10 MG tablet Take 1 tablet (10 mg) by  mouth 3 times daily as needed for muscle spasms (Patient not taking: Reported on 12/15/2020) 30 tablet 0     fluticasone (FLONASE) 50 MCG/ACT nasal spray Spray 1 spray into both nostrils daily (Patient not taking: Reported on 12/15/2020) 16 g 11     ipratropium (ATROVENT) 0.06 % nasal spray Spray 2 sprays into both nostrils 4 times daily (Patient not taking: Reported on 12/15/2020) 15 mL 11     ranitidine (ZANTAC) 300 MG tablet        tamsulosin (FLOMAX) 0.4 MG capsule Take 1 capsule (0.4 mg) by mouth daily (Patient not taking: Reported on 12/15/2020) 30 capsule 0     Allergies   Allergen Reactions     Sinus & Allergy [Pseudoephedrine]      Gets sinuses from [place]lin      FAMILY HISTORY: There is no reported history of genitourinary carcinoma.  There is no history of urolithiasis.      Social History     Socioeconomic History     Marital status:      Spouse name: Sebastian     Number of children: 4     Years of education: Not on file     Highest education level: Not on file   Occupational History     Occupation: housekeeping   Social Needs     Financial resource strain: Not hard at all     Food insecurity     Worry: Never true     Inability: Never true     Transportation needs     Medical: No     Non-medical: No   Tobacco Use     Smoking status: Never Smoker     Smokeless tobacco: Never Used   Substance and Sexual Activity     Alcohol use: No     Alcohol/week: 0.0 standard drinks     Frequency: Never     Drinks per session: Patient refused     Drug use: No     Sexual activity: Yes     Partners: Male   Lifestyle     Physical activity     Days per week: 4 days     Minutes per session: 120 min     Stress: Not on file   Relationships     Social connections     Talks on phone: Three times a week     Gets together: Three times a week     Attends [employer] service: More than 4 times per year     Active member of club or organization: Yes     Attends meetings of clubs or organizations: 1 to 4 times per year     Relationship  "status:      Intimate partner violence     Fear of current or ex partner: Not on file     Emotionally abused: Not on file     Physically abused: Not on file     Forced sexual activity: Not on file   Other Topics Concern     Parent/sibling w/ CABG, MI or angioplasty before 65F 55M? No   Social History Narrative     Not on file       ROS: A comprehensive 14 point ROS was obtained and negative except for that outlined above in the HPI.    PHYSICAL EXAM:   Vitals:    12/15/20 0856   Weight: 56.2 kg (124 lb)   Height: 1.6 m (5' 3\")     PSYCH: NAD, talks in full sentences    Office Visit on 11/17/2020   Component Date Value Ref Range Status     Color Urine 11/17/2020 Yellow   Final     Appearance Urine 11/17/2020 Clear   Final     Glucose Urine 11/17/2020 Negative  NEG^Negative mg/dL Final     Bilirubin Urine 11/17/2020 Negative  NEG^Negative Final     Ketones Urine 11/17/2020 Negative  NEG^Negative mg/dL Final     Specific Gravity Urine 11/17/2020 1.020  1.003 - 1.035 Final     Blood Urine 11/17/2020 Small* NEG^Negative Final     pH Urine 11/17/2020 5.5  5.0 - 7.0 pH Final     Protein Albumin Urine 11/17/2020 Negative  NEG^Negative mg/dL Final     Urobilinogen Urine 11/17/2020 0.2  0.2 - 1.0 EU/dL Final     Nitrite Urine 11/17/2020 Negative  NEG^Negative Final     Leukocyte Esterase Urine 11/17/2020 Negative  NEG^Negative Final     Source 11/17/2020 Midstream Urine   Final     WBC Urine 11/17/2020 0 - 5  OTO5^0 - 5 /HPF Final     RBC Urine 11/17/2020 5-10* OTO2^O - 2 /HPF Final     Squamous Epithelial /LPF Urine 11/17/2020 Few  FEW^Few /LPF Final     Bacteria Urine 11/17/2020 Few* NEG^Negative /HPF Final     Specimen Description 11/17/2020 Vagina   Final     Wet Prep 11/17/2020 No Trichomonas seen   Final     Wet Prep 11/17/2020 No clue cells seen   Final     Wet Prep 11/17/2020 No yeast seen   Final     Wet Prep 11/17/2020    Final                    Value:Few  WBC'S seen         IMAGING:   CT ABDOMEN AND PELVIS " WITHOUT CONTRAST 11/25/2020 8:03 AM     CLINICAL HISTORY: Flank pain, stone disease suspected - right. Acute  right-sided low back pain without sciatica. Surgical history of  hysterectomy and appendectomy.     TECHNIQUE: CT scan of the abdomen and pelvis was performed without IV  contrast. Multiplanar reformats were obtained. Dose reduction  techniques were used.     CONTRAST: None.     COMPARISON: CT abdomen and pelvis dated 12/23/2005. Limited abdominal  ultrasound dated 2/20/2019.     FINDINGS:   LOWER CHEST: 0.3 cm benign calcified granuloma is seen in the  posterior right lung base. There is minimal dependent atelectasis in  the posterior bilateral lung bases. Visualized portions of the lung  bases and mediastinal contents are otherwise grossly unremarkable.     HEPATOBILIARY: Normal.     PANCREAS: Normal.     SPLEEN: Normal.     ADRENAL GLANDS: Normal.     KIDNEYS/BLADDER: Hypoattenuating lesion in the posterior cortex of the  superior pole of the left kidney measures up to 2.3 cm in diameter and  has significantly increased in size since the prior study dated 2005.  It has an average density of 5 Hounsfield units and is most consistent  with a cyst. The kidneys are otherwise normal in appearance for a  noncontrast CT. There is mild left hydronephrosis and proximal left  hydroureter. There is a tiny density at the left ureterovesicular  junction measuring less than 0.2 cm (image 185 series 4). This could  represent a tiny distal left ureterovesicular junction calculus. The  mid to distal ureter on the left is normal in caliber. There is no  hydronephrosis, hydroureter or, nephrolithiasis or ureteral calculus  in the right urinary collecting system. Urinary bladder is otherwise  normal appearance in for a noncontrast CT.     BOWEL: No significant air is seen in the colon from the hepatic  flexure distally. This could represent mild colon wall thickening but  could also be due to incomplete distention of the  colon. Colitis is  difficult to entirely exclude. The colon is otherwise grossly of  normal caliber without pericolonic inflammatory change to suggest  acute diverticulitis. Appendix is not seen, consistent with surgical  history. No pericecal inflammatory change to suggest acute  appendicitis. Small bowel is grossly of normal caliber. Stomach is  distended by a large amount of ingested material but is otherwise  unremarkable.     LYMPH NODES: No lymphadenopathy is seen.     VASCULATURE: There is nonaneurysmal atherosclerosis.     PELVIC ORGANS:  Structures which could represent normal ovaries are  seen in the bilateral adnexal regions. These appear slightly atrophic.     OTHER: No free fluid or free air is seen in the peritoneal cavity.  There is a tiny fat-containing umbilical hernia, similar to the prior  study.     MUSCULOSKELETAL: No aggressive osseous lesions or acute osseous  fractures are identified.                                                                      IMPRESSION:   1.  Tiny density at the left ureterovesicular junction could represent  a tiny distal ureterovesicular junction calculus, but could also  represent artifact. This does not appear to be causing significant  obstruction. Mild prominence of the left intrarenal collecting system  and proximal left ureter are noted but the mid to distal left ureter  is normal in caliber. No evidence for obstruction or calculus is seen  in the right urinary collecting system.  2.  Relative paucity of gas in the colon from the hepatic flexure  distally and questionable colon wall thickening throughout the same  region. This could be due to incomplete distention but diffuse colitis  is difficult to exclude. Recommend clinical correlation.  3.  Significantly increased size of cyst in the posterior aspect of  the superior pole of the left kidney.  4.  Evaluation of the solid organs of the abdomen and pelvis is  limited due to lack of IV  contrast.    ASSESSMENT and PLAN:    60 year old female with micro hematuria x1, will recheck in 4 weeks.   -If micro hematuria persists, will recommend proceeding with comprehensive hematuria evaluation to include:  - Urine cytology to look for cells concerning for malignancy.  - Cystoscopy with the first available urologist to evaluate the interior of the bladder. Follow up for hematuria as recommended by urologist performing cystoscopic evaluation.    Thank you for allowing me to participate in Ms. Carbajal's care. I will keep you updated of her progress, but please do not hesitate to contact me with any questions.    Mary Gonzalez PA-C  [facility]

## 2020-12-15 NOTE — LETTER
"12/15/2020       RE: Mary Carbajal  [address] [facility] [address]     Dear Colleague,    Thank you for referring your patient, Mary Carbajal, to the [facility] [place] at [facility]. Please see a copy of my visit note below.    Mary Carbajal is a 60 year old female who is being evaluated via a billable video visit.  ------------------------------------------------------  Pt denies gross hem.  Pt states she has an inflamed colon.  Pt denies hx of stones.  Pt needs Malian interurperter  Pt had vaginal US  pt sometimes has back pain  ------------------------------------------------------        Mary Carbajal is a 60 year old female who is being evaluated via a billable telephone visit.      The patient has been notified of following:     \"This telephone visit will be conducted via a call between you and your physician/provider. We have found that certain health care needs can be provided without the need for a physical exam.  This service lets us provide the care you need with a short phone conversation.  If a prescription is necessary we can send it directly to your pharmacy.  If lab work is needed we can place an order for that and you can then stop by our lab to have the test done at a later time.    Telephone visits are billed at different rates depending on your insurance coverage. During this emergency period, for some insurers they may be billed the same as an in-person visit.  Please reach out to your insurance provider with any questions.    If during the course of the call the physician/provider feels a telephone visit is not appropriate, you will not be charged for this service.\"    Patient has given verbal consent for Telephone visit?  Yes    What phone number would you like to be contacted at? cell    How would you like to obtain your AVS? Mail a copy    Phone call duration: 10 minutes    CC: Hematuria.    HPI: It is a pleasure to see Ms. " Mary Carbajal, a 60 year old female, asked to be seen via billable phone visit in consultation by Al Ulrich PA-C for evaluation of micro hematuria.     The patient denies any gross hematuria.  Ms. Carbajal voids without difficulty.  She currently denies any dysuria, pyuria, hesitancy, intermittency, feelings of incomplete emptying, or any recent history of urinary tract infections or stones.    CT notes renal cyst. Only one UA with micro hematuria.    The patient presents today with an  whom aids in obtaining the history and facilitating the visit.      Hematuria Risk Factors:  Age >40: Yes     Smoking history: no  Occupational exposure to chemicals or dyes (ie, benzenes, aromatic amines): no  History of urologic disorder or disease: no  History of irritative voiding symptoms: no  History of urinary tract infection: no  Analgesic abuse: no  History of pelvic irradiation: no    Past Medical History:   Diagnosis Date     Anxiety 1/29/2020     CARDIOVASCULAR SCREENING; LDL GOAL LESS THAN 160 10/31/2010     Cervicalgia 9/14/2018     Chronic allergic rhinitis due to other allergic trigger, unspecified seasonality 9/14/2018     Constipation, unspecified constipation type 9/14/2018     Family history of diabetes mellitus 10/28/2013     GERD (gastroesophageal reflux disease) 3/17/2011     History of neck pain 1/19/2019     Hx of abdominal pain 1/19/2019     Insomnia, unspecified type 10/12/2018     Mild single current episode of major depressive disorder (H) 9/14/2018     Non-seasonal allergic rhinitis 9/14/2018     Skin macule 10/12/2018     Vaginal atrophy 10/8/2015     Past Surgical History:   Procedure Laterality Date     COLONOSCOPY N/A 3/15/2019    Procedure: COLONOSCOPY & EGD;  Surgeon: Orlin Chaudhry MD;  Location:  GI     ESOPHAGOSCOPY, GASTROSCOPY, DUODENOSCOPY (EGD), COMBINED N/A 3/15/2019    Procedure: ESOPHAGOSCOPY, GASTROSCOPY, DUODENOSCOPY (EGD) (FV);  Surgeon: Orlin Chaudhry MD;   Location:  GI     HYSTERECTOMY, VAGINAL       LAPAROSCOPIC APPENDECTOMY       Current Outpatient Medications   Medication Sig Dispense Refill     cholecalciferol 2000 units tablet Take 1 tablet by mouth daily 90 tablet 1     cyclobenzaprine (FLEXERIL) 10 MG tablet Take 1 tablet (10 mg) by mouth 3 times daily as needed for muscle spasms (Patient not taking: Reported on 12/15/2020) 30 tablet 0     fluticasone (FLONASE) 50 MCG/ACT nasal spray Spray 1 spray into both nostrils daily (Patient not taking: Reported on 12/15/2020) 16 g 11     ipratropium (ATROVENT) 0.06 % nasal spray Spray 2 sprays into both nostrils 4 times daily (Patient not taking: Reported on 12/15/2020) 15 mL 11     ranitidine (ZANTAC) 300 MG tablet        tamsulosin (FLOMAX) 0.4 MG capsule Take 1 capsule (0.4 mg) by mouth daily (Patient not taking: Reported on 12/15/2020) 30 capsule 0     Allergies   Allergen Reactions     Sinus & Allergy [Pseudoephedrine]      Gets sinuses from pollin      FAMILY HISTORY: There is no reported history of genitourinary carcinoma.  There is no history of urolithiasis.      Social History     Socioeconomic History     Marital status:      Spouse name: Sebastian     Number of children: 4     Years of education: Not on file     Highest education level: Not on file   Occupational History     Occupation: housekeeping   Social Needs     Financial resource strain: Not hard at all     Food insecurity     Worry: Never true     Inability: Never true     Transportation needs     Medical: No     Non-medical: No   Tobacco Use     Smoking status: Never Smoker     Smokeless tobacco: Never Used   Substance and Sexual Activity     Alcohol use: No     Alcohol/week: 0.0 standard drinks     Frequency: Never     Drinks per session: Patient refused     Drug use: No     Sexual activity: Yes     Partners: Male   Lifestyle     Physical activity     Days per week: 4 days     Minutes per session: 120 min     Stress: Not on file   Relationships  "    Social connections     Talks on phone: Three times a week     Gets together: Three times a week     Attends Mu-ism service: More than 4 times per year     Active member of club or organization: Yes     Attends meetings of clubs or organizations: 1 to 4 times per year     Relationship status:      Intimate partner violence     Fear of current or ex partner: Not on file     Emotionally abused: Not on file     Physically abused: Not on file     Forced sexual activity: Not on file   Other Topics Concern     Parent/sibling w/ CABG, MI or angioplasty before 65F 55M? No   Social History Narrative     Not on file       ROS: A comprehensive 14 point ROS was obtained and negative except for that outlined above in the HPI.    PHYSICAL EXAM:   Vitals:    12/15/20 0856   Weight: 56.2 kg (124 lb)   Height: 1.6 m (5' 3\")     PSYCH: NAD, talks in full sentences    Office Visit on 11/17/2020   Component Date Value Ref Range Status     Color Urine 11/17/2020 Yellow   Final     Appearance Urine 11/17/2020 Clear   Final     Glucose Urine 11/17/2020 Negative  NEG^Negative mg/dL Final     Bilirubin Urine 11/17/2020 Negative  NEG^Negative Final     Ketones Urine 11/17/2020 Negative  NEG^Negative mg/dL Final     Specific Gravity Urine 11/17/2020 1.020  1.003 - 1.035 Final     Blood Urine 11/17/2020 Small* NEG^Negative Final     pH Urine 11/17/2020 5.5  5.0 - 7.0 pH Final     Protein Albumin Urine 11/17/2020 Negative  NEG^Negative mg/dL Final     Urobilinogen Urine 11/17/2020 0.2  0.2 - 1.0 EU/dL Final     Nitrite Urine 11/17/2020 Negative  NEG^Negative Final     Leukocyte Esterase Urine 11/17/2020 Negative  NEG^Negative Final     Source 11/17/2020 Midstream Urine   Final     WBC Urine 11/17/2020 0 - 5  OTO5^0 - 5 /HPF Final     RBC Urine 11/17/2020 5-10* OTO2^O - 2 /HPF Final     Squamous Epithelial /LPF Urine 11/17/2020 Few  FEW^Few /LPF Final     Bacteria Urine 11/17/2020 Few* NEG^Negative /HPF Final     Specimen " Description 11/17/2020 Vagina   Final     Wet Prep 11/17/2020 No Trichomonas seen   Final     Wet Prep 11/17/2020 No clue cells seen   Final     Wet Prep 11/17/2020 No yeast seen   Final     Wet Prep 11/17/2020    Final                    Value:Few  WBC'S seen         IMAGING:   CT ABDOMEN AND PELVIS WITHOUT CONTRAST 11/25/2020 8:03 AM     CLINICAL HISTORY: Flank pain, stone disease suspected - right. Acute  right-sided low back pain without sciatica. Surgical history of  hysterectomy and appendectomy.     TECHNIQUE: CT scan of the abdomen and pelvis was performed without IV  contrast. Multiplanar reformats were obtained. Dose reduction  techniques were used.     CONTRAST: None.     COMPARISON: CT abdomen and pelvis dated 12/23/2005. Limited abdominal  ultrasound dated 2/20/2019.     FINDINGS:   LOWER CHEST: 0.3 cm benign calcified granuloma is seen in the  posterior right lung base. There is minimal dependent atelectasis in  the posterior bilateral lung bases. Visualized portions of the lung  bases and mediastinal contents are otherwise grossly unremarkable.     HEPATOBILIARY: Normal.     PANCREAS: Normal.     SPLEEN: Normal.     ADRENAL GLANDS: Normal.     KIDNEYS/BLADDER: Hypoattenuating lesion in the posterior cortex of the  superior pole of the left kidney measures up to 2.3 cm in diameter and  has significantly increased in size since the prior study dated 2005.  It has an average density of 5 Hounsfield units and is most consistent  with a cyst. The kidneys are otherwise normal in appearance for a  noncontrast CT. There is mild left hydronephrosis and proximal left  hydroureter. There is a tiny density at the left ureterovesicular  junction measuring less than 0.2 cm (image 185 series 4). This could  represent a tiny distal left ureterovesicular junction calculus. The  mid to distal ureter on the left is normal in caliber. There is no  hydronephrosis, hydroureter or, nephrolithiasis or ureteral calculus  in  the right urinary collecting system. Urinary bladder is otherwise  normal appearance in for a noncontrast CT.     BOWEL: No significant air is seen in the colon from the hepatic  flexure distally. This could represent mild colon wall thickening but  could also be due to incomplete distention of the colon. Colitis is  difficult to entirely exclude. The colon is otherwise grossly of  normal caliber without pericolonic inflammatory change to suggest  acute diverticulitis. Appendix is not seen, consistent with surgical  history. No pericecal inflammatory change to suggest acute  appendicitis. Small bowel is grossly of normal caliber. Stomach is  distended by a large amount of ingested material but is otherwise  unremarkable.     LYMPH NODES: No lymphadenopathy is seen.     VASCULATURE: There is nonaneurysmal atherosclerosis.     PELVIC ORGANS:  Structures which could represent normal ovaries are  seen in the bilateral adnexal regions. These appear slightly atrophic.     OTHER: No free fluid or free air is seen in the peritoneal cavity.  There is a tiny fat-containing umbilical hernia, similar to the prior  study.     MUSCULOSKELETAL: No aggressive osseous lesions or acute osseous  fractures are identified.                                                                      IMPRESSION:   1.  Tiny density at the left ureterovesicular junction could represent  a tiny distal ureterovesicular junction calculus, but could also  represent artifact. This does not appear to be causing significant  obstruction. Mild prominence of the left intrarenal collecting system  and proximal left ureter are noted but the mid to distal left ureter  is normal in caliber. No evidence for obstruction or calculus is seen  in the right urinary collecting system.  2.  Relative paucity of gas in the colon from the hepatic flexure  distally and questionable colon wall thickening throughout the same  region. This could be due to incomplete  distention but diffuse colitis  is difficult to exclude. Recommend clinical correlation.  3.  Significantly increased size of cyst in the posterior aspect of  the superior pole of the left kidney.  4.  Evaluation of the solid organs of the abdomen and pelvis is  limited due to lack of IV contrast.    ASSESSMENT and PLAN:    60 year old female with micro hematuria x1, will recheck in 4 weeks.   -If micro hematuria persists, will recommend proceeding with comprehensive hematuria evaluation to include:  - Urine cytology to look for cells concerning for malignancy.  - Cystoscopy with the first available urologist to evaluate the interior of the bladder. Follow up for hematuria as recommended by urologist performing cystoscopic evaluation.    Thank you for allowing me to participate in Ms. Carbajal's care. I will keep you updated of her progress, but please do not hesitate to contact me with any questions.    Mary Gonzalez PA-C  [facility]

## 2020-12-17 ENCOUNTER — APPOINTMENT (OUTPATIENT)
Dept: INTERPRETER SERVICES | Facility: CLINIC | Age: 60
End: 2020-12-17
Payer: COMMERCIAL

## 2020-12-18 ENCOUNTER — OFFICE VISIT (OUTPATIENT)
Dept: FAMILY MEDICINE | Facility: CLINIC | Age: 60
End: 2020-12-18
Payer: COMMERCIAL

## 2020-12-18 VITALS
WEIGHT: 127 LBS | HEART RATE: 103 BPM | DIASTOLIC BLOOD PRESSURE: 76 MMHG | BODY MASS INDEX: 22.5 KG/M2 | TEMPERATURE: 98.3 F | SYSTOLIC BLOOD PRESSURE: 117 MMHG | RESPIRATION RATE: 16 BRPM

## 2020-12-18 DIAGNOSIS — K59.00 CONSTIPATION, UNSPECIFIED CONSTIPATION TYPE: ICD-10-CM

## 2020-12-18 DIAGNOSIS — K64.4 EXTERNAL HEMORRHOIDS: Primary | ICD-10-CM

## 2020-12-18 PROCEDURE — 99213 OFFICE O/P EST LOW 20 MIN: CPT | Performed by: PHYSICIAN ASSISTANT

## 2020-12-18 RX ORDER — HYDROCORTISONE 25 MG/G
CREAM TOPICAL 2 TIMES DAILY PRN
Qty: 30 G | Refills: 0 | Status: SHIPPED | OUTPATIENT
Start: 2020-12-18 | End: 2021-02-04

## 2020-12-18 NOTE — PROGRESS NOTES
Subjective     Future Appointments   Date Time Provider Department Center   12/18/2020  7:30 AM Mikey Ulrich PA-C [facility] CR   1/11/2021  7:30 AM [facility]   1/18/2021  2:00 PM Mary Gonzalez PA-C [facility] PHY [facility]     Appointment Notes for this encounter:      Lump by anus  No in person  avaialble. Please use Ipad or call [phone] option 1 then option 1 for phone  if needed.    There are no preventive care reminders to display for this patient.  Health Maintenance addressed:  NONE    MyChart Status:  Not Active Patient declined      Mary Carbajal is a 60 year old female who presents to clinic today for the following health issues:    HPI         Concern - lump  Onset: x 2 weeks  Description: small lump by anus   Intensity: moderate  Progression of Symptoms:  same  Accompanying Signs & Symptoms: painful BM's, constipation  Previous history of similar problem: none  Therapies tried and outcome: preperation H-temporary relief    Takes miralax daily for constipation. Thyroid checked within the last year and negative. Symptoms fluctuates and tries not only miralax but also increased water and fiber.     Review of Systems   Constitutional, HEENT, cardiovascular, pulmonary, gi and gu systems are negative, except as otherwise noted.      Objective    /76 (BP Location: Right arm, Patient Position: Chair, Cuff Size: Adult Regular)   Pulse 103   Temp 98.3  F (36.8  C) (Oral)   Resp 16   Wt 57.6 kg (127 lb)   LMP  (LMP Unknown)   BMI 22.50 kg/m    Body mass index is 22.5 kg/m .  Physical Exam   GENERAL: healthy, alert and no distress  RECTAL (female): external hemorrhoid at 6 o clock without bleeding. Mild tenderness to palpation.   PSYCH: mentation appears normal, affect normal/bright          Assessment & Plan     External hemorrhoids  Present on exam. Likely secondary to constipation. Failed otc management. Recommending sitz baths, laxative use until symptoms resolve  and bid anusol. If no improvement in 1-2 weeks, will have see colorectal.   - hydrocortisone, Perianal, (HYDROCORTISONE) 2.5 % cream; Place rectally 2 times daily as needed for hemorrhoids  -Medication use and side effects discussed with the patient. Patient is in complete understanding and agreement with plan.       Constipation, unspecified constipation type  Chronic and recurrent. Failed conservative management and work up thus far negative. Will have see GI for second opinion.   - GASTROENTEROLOGY ADULT REF CONSULT ONLY; Future          Return in about 1 year (around 12/18/2021) for Physical Exam.    Mikey Ulrich PA-C  [facility]

## 2020-12-18 NOTE — PATIENT INSTRUCTIONS
Patient Education     Tratamiento del estreñimiento    El estreñimiento es un problema común y, a menudo, molesto. Significa que evacúa oh intestinos menos de 3 veces a la semana. o que debe hacer esfuerzo al ir al baño porque las heces son duras y secas. Puede durar poco tiempo. O puede ser un problema que parece no terminar nunca. La buena noticia es que, a menudo, puede tratarse y mantenerse bajo control.  Coma más fibra  Stephenie de las mejores maneras de tratar el estreñimiento es aumentar la fibra que come. Usted puede hacer esto a través de la dieta o mediante suplementos de fibra. La fibra (que está en los granos enteros, las frutas y los vegetales) se agranda en los intestinos y absorbe agua, lo cual ablanda las heces. [place] ayuda a que las heces pasen más fácilmente por el colon. Cuando coma más fibra, hágalo poco a poco para evitar efectos secundarios tales gaurav la hinchazón. También debe mike más agua. Si come más de estos alimentos, estará agregando fibra a weldon dieta:    Cereales altos en fibra    Granos enteros, salvado y arroz integral    Vegetales tales gaurav las zanahorias, el brócoli y las hojas verdes    Frutas frescas (en especial las manzanas y las peras, y frutas secas gaurav las uvas pasas y los albaricoques)    Esa secos y legumbres (en especial, frijoles, lentejas, frijoles rojos y frijoles verdes)  Tim actividad física  El ejercicio ayuda a mejorar el funcionamiento del colon, lo que puede aliviar el estreñimiento. Trate de hacer ejercicios físicos todos los días. Si no carcamo hecho actividad en algún tiempo, hable con weldon proveedor de atención médica antes de volver a empezar.  Laxantes  Es posible que weldon proveedor de atención médica le sugiera usar algún producto de venta joseph para ayudar a aliviar el estreñimiento. Puede que le sugiera usar agentes para incrementar el john fecal o laxantes. Los laxantes, si los usa davi gaurav le indique weldon profesional de atención médica, son comunes y seguros.  Siga las instrucciones al pie de la letra para usarlos. Consulte a weldon proveedor ante jodi aparición nueva de estreñimiento, para descartar otras causas, tales gaurav medicamentos o alguna enfermedad de la glándula tiroides.    [id number] [employer]. [address] [place] [id number]. Todos los derechos reservados. Esta información no pretende sustituir la atención médica profesional. Sólo weldon médico puede diagnosticar y tratar un problema de mesha.           Patient Education     Hemorroides    Las hemorroides son venas hinchadas e inflamadas dentro del recto y cerca del ano. El recto es la porción final del colon y mide varias pulgadas. El ano es el conducto entre el recto y el exterior del cuerpo.   Causas  Las venas pueden inflamarse cuando sufren mayor presión. Por lo general, eso puede deberse a lo siguiente:     Estreñimiento o diarrea crónicos    Hacer fuerza para evacuar los intestinos    Pasar demasiado tiempo sentado en el inodoro    Tener jodi dieta baja en fibra    Embarazo  Síntomas    Sangrado del recto. Puede notar esto después de defecar.    Bulto cerca del ano    Comezón alrededor del ano    Dolor alrededor del ano    Secreción de mucosidad del ano  Hay diferentes tipos de hemorroides. Según el tipo y la gravedad de las hemorroides que tenga, es posible que pueda tratarse usted mismo en weldon casa. En algunos casos, jodi cirugía puede ser la mejor opción de tratamiento. Weldon proveedor de atención médica puede darle más información sobre esta opción, si es necesario.   Cuidados en el hogar  Cuidados generales    Para aliviar el dolor o la picazón, intente lo siguiente:  ? Medicamentos. Weldon proveedor de atención médica podrá recomendarle ablandadores de heces, supositorios o laxantes para ayudarle a controlar el estreñimiento. Úselos davi gaurav le hayan indicado.  ? Jose de asiento. Un baño de asiento consiste en sentarse en jodi ariadna que contiene algunas pulgadas de agua tibia. Tenga cuidado  de que el agua no esté alexander caliente que pueda quemarse. ([place]ru[place]bela antes de sentarse). Empape la kath afectada de unos 10 a 15 minutos, varias veces al día. Puede ayudar a aliviar el dolor.  ? Productos tópicos. Weldon proveedor de atención médica puede recetarle o recomendarle cremas, pomadas o almohadillas que pueden aplicarse sobre las hemorroides. Úselos davi gaurav le hayan indicado.  Sugerencias para ayudar a prevenir las hemorroides    Coma más fibra. La fibra agrega volumen a las heces y absorbe agua al ir moviéndose por el colon. [place] hace que las heces se ablanden y resulte más fácil expulsarlas.  ? Aumente la fibra en weldon dieta. Para eso coma más alimentos ricos en fibra. Estos incluyen frutas frescas, verduras y granos integrales.  ? [place] un suplemento de fibra o un agente volumétrico, si así le recomienda weldon proveedor de atención médica. Por ejemplo, psilio o metilcelulosa.    Piotr más agua. Weldon proveedor de atención médica también puede recomendarle que piotr abundante cantidad de agua. Eso puede ayudar a ablandar las heces.    Osman más actividad física. Ejercitarse con regularidad ayuda a la digestión y a prevenir el estreñimiento. También puede ayudarlo a hacer deposiciones más regulares.    No osman esfuerzo para defecar. Puede aumentar las probabilidades de tener hemorroides. Tampoco se quede sentado en el inodoro por períodos largos.    Seguimiento  [place] un seguimiento con weldon proveedor de atención médica o gaurav le indiquen. Si le realizaron un cultivo o pruebas de diagnóstico por imágenes, le avisarán cuando los resultados estén listos. [place] puede tardar algunos días o más. Si weldon proveedor de atención médica recomienda un procedimiento para las hemorroides, se pueden analizar las siguientes opciones: cirugía y tratamientos ambulatorios en el consultorio.   Cuándo debe buscar atención médica  Llame a weldon proveedor de atención médica de inmediato si presenta alguno de estos síntomas:    Aumento del sangrado del  recto    Más dolor alrededor del recto o el ano    Debilidad o mareos  Llame al 911  Llame al 911ante cualquiera de las situaciones a continuación:     Dificultad para respirar o tragar    Desmayo o pérdida del conocimiento    Frecuencia cardíaca inusualmente rápida    Vómitos con shakeel    Grandes cantidades de shakeel en la heces o heces negras y alquitranadas    [id number] [employer]. [address] [place] [id number]. Todos los derechos reservados. Esta información no pretende sustituir la atención médica profesional. Sólo weldon médico puede diagnosticar y tratar un problema de mesha.

## 2021-01-11 DIAGNOSIS — R31.9 HEMATURIA, UNSPECIFIED TYPE: ICD-10-CM

## 2021-01-11 LAB
ALBUMIN UR-MCNC: NEGATIVE MG/DL
APPEARANCE UR: CLEAR
BACTERIA #/AREA URNS HPF: ABNORMAL /HPF
BILIRUB UR QL STRIP: NEGATIVE
COLOR UR AUTO: YELLOW
GLUCOSE UR STRIP-MCNC: NEGATIVE MG/DL
HGB UR QL STRIP: ABNORMAL
KETONES UR STRIP-MCNC: NEGATIVE MG/DL
LEUKOCYTE ESTERASE UR QL STRIP: NEGATIVE
NITRATE UR QL: NEGATIVE
NON-SQ EPI CELLS #/AREA URNS LPF: ABNORMAL /LPF
PH UR STRIP: 5 PH (ref 5–7)
RBC #/AREA URNS AUTO: ABNORMAL /HPF
SOURCE: ABNORMAL
SP GR UR STRIP: 1.02 (ref 1–1.03)
UROBILINOGEN UR STRIP-ACNC: 0.2 EU/DL (ref 0.2–1)
WBC #/AREA URNS AUTO: ABNORMAL /HPF

## 2021-01-11 PROCEDURE — 81001 URINALYSIS AUTO W/SCOPE: CPT | Performed by: PHYSICIAN ASSISTANT

## 2021-01-12 ENCOUNTER — APPOINTMENT (OUTPATIENT)
Dept: INTERPRETER SERVICES | Facility: CLINIC | Age: 61
End: 2021-01-12
Payer: COMMERCIAL

## 2021-01-12 DIAGNOSIS — R31.9 HEMATURIA, UNSPECIFIED TYPE: Primary | ICD-10-CM

## 2021-01-18 ENCOUNTER — VIRTUAL VISIT (OUTPATIENT)
Dept: UROLOGY | Facility: CLINIC | Age: 61
End: 2021-01-18
Payer: COMMERCIAL

## 2021-01-18 VITALS — BODY MASS INDEX: 22.5 KG/M2 | WEIGHT: 127 LBS | HEIGHT: 63 IN

## 2021-01-18 DIAGNOSIS — R31.9 HEMATURIA, UNSPECIFIED TYPE: Primary | ICD-10-CM

## 2021-01-18 PROCEDURE — 99213 OFFICE O/P EST LOW 20 MIN: CPT | Mod: 95 | Performed by: PHYSICIAN ASSISTANT

## 2021-01-18 ASSESSMENT — PAIN SCALES - GENERAL: PAINLEVEL: NO PAIN (0)

## 2021-01-18 ASSESSMENT — MIFFLIN-ST. JEOR: SCORE: 1125.2

## 2021-01-18 NOTE — LETTER
"1/18/2021       RE: Mary Carbajal  [address] [facility] [place] [address]     Dear Colleague,    Thank you for referring your patient, Mary Carbajal, to the [facility] [place] at [facility]. Please see a copy of my visit note below.        Mary is a 60 year old who is being evaluated via a billable telephone visit.      What phone number would you like to be contacted at? [phone]  How would you like to obtain your AVS? Mail a copy      Phone call duration: 5 minutes    Review of external notes as documented above /below  Independent interpretation of a test performed by another physician/other qualified health care professional (not separately reported) - UA micro      6 min spent on the date of the encounter in chart review, patient visit, review of tests, documentation and/or discussion with other providers about the issues documented above.           CC: \"Hematuria\".     HPI: It is a pleasure to see Ms. Mary Carbajal, a 60 year old female, seen in follow up of micro hematuria.      The patient denies any gross hematuria.  Ms. Carbajal voids without difficulty.  She currently denies any dysuria, pyuria, hesitancy, intermittency, feelings of incomplete emptying, or any recent history of urinary tract infections or stones.     CT notes renal cyst. Only one UA with micro hematuria.     The patient presents today with an  whom aids in obtaining the history and facilitating the visit. Recheck UA micro was neg.         Hematuria Risk Factors:  Age >40: Yes                                       Smoking history: no  Occupational exposure to chemicals or dyes (ie, benzenes, aromatic amines): no  History of urologic disorder or disease: no  History of irritative voiding symptoms: no  History of urinary tract infection: no  Analgesic abuse: no  History of pelvic irradiation: no     Past Medical History        Past Medical History:   Diagnosis Date     " Anxiety 1/29/2020     CARDIOVASCULAR SCREENING; LDL GOAL LESS THAN 160 10/31/2010     Cervicalgia 9/14/2018     Chronic allergic rhinitis due to other allergic trigger, unspecified seasonality 9/14/2018     Constipation, unspecified constipation type 9/14/2018     Family history of diabetes mellitus 10/28/2013     GERD (gastroesophageal reflux disease) 3/17/2011     History of neck pain 1/19/2019     Hx of abdominal pain 1/19/2019     Insomnia, unspecified type 10/12/2018     Mild single current episode of major depressive disorder (H) 9/14/2018     Non-seasonal allergic rhinitis 9/14/2018     Skin macule 10/12/2018     Vaginal atrophy 10/8/2015         Past Surgical History         Past Surgical History:   Procedure Laterality Date     COLONOSCOPY N/A 3/15/2019     Procedure: COLONOSCOPY & EGD;  Surgeon: Orlin Chaudhry MD;  Location:  GI     ESOPHAGOSCOPY, GASTROSCOPY, DUODENOSCOPY (EGD), COMBINED N/A 3/15/2019     Procedure: ESOPHAGOSCOPY, GASTROSCOPY, DUODENOSCOPY (EGD) ([facility]);  Surgeon: Orlin Chaudhry MD;  Location:  GI     HYSTERECTOMY, VAGINAL         LAPAROSCOPIC APPENDECTOMY             Current Outpatient Prescriptions          Current Outpatient Medications   Medication Sig Dispense Refill     cholecalciferol 2000 units tablet Take 1 tablet by mouth daily 90 tablet 1     cyclobenzaprine (FLEXERIL) 10 MG tablet Take 1 tablet (10 mg) by mouth 3 times daily as needed for muscle spasms (Patient not taking: Reported on 12/15/2020) 30 tablet 0     fluticasone (FLONASE) 50 MCG/ACT nasal spray Spray 1 spray into both nostrils daily (Patient not taking: Reported on 12/15/2020) 16 g 11     ipratropium (ATROVENT) 0.06 % nasal spray Spray 2 sprays into both nostrils 4 times daily (Patient not taking: Reported on 12/15/2020) 15 mL 11     ranitidine (ZANTAC) 300 MG tablet           tamsulosin (FLOMAX) 0.4 MG capsule Take 1 capsule (0.4 mg) by mouth daily (Patient not taking: Reported on 12/15/2020) 30 capsule 0                Allergies   Allergen Reactions     Sinus & Allergy [Pseudoephedrine]         Gets sinuses from pollin       FAMILY HISTORY: There is no reported history of genitourinary carcinoma.  There is no history of urolithiasis.       Social History   Social History            Socioeconomic History     Marital status:        Spouse name: Sebastian     Number of children: 4     Years of education: Not on file     Highest education level: Not on file   Occupational History     Occupation: housekeeping   Social Needs     Financial resource strain: Not hard at all     Food insecurity       Worry: Never true       Inability: Never true     Transportation needs       Medical: No       Non-medical: No   Tobacco Use     Smoking status: Never Smoker     Smokeless tobacco: Never Used   Substance and Sexual Activity     Alcohol use: No       Alcohol/week: 0.0 standard drinks       Frequency: Never       Drinks per session: Patient refused     Drug use: No     Sexual activity: Yes       Partners: Male   Lifestyle     Physical activity       Days per week: 4 days       Minutes per session: 120 min     Stress: Not on file   Relationships     Social connections       Talks on phone: Three times a week       Gets together: Three times a week       Attends Voodoo service: More than 4 times per year       Active member of club or organization: Yes       Attends meetings of clubs or organizations: 1 to 4 times per year       Relationship status:      Intimate partner violence       Fear of current or ex partner: Not on file       Emotionally abused: Not on file       Physically abused: Not on file       Forced sexual activity: Not on file   Other Topics Concern     Parent/sibling w/ CABG, MI or angioplasty before 65F 55M? No   Social History Narrative     Not on file            ROS: A comprehensive 8 point ROS was obtained and negative except for that outlined above in the HPI.     PHYSICAL EXAM:   Vitals       Vitals:  "    12/15/20 0856   Weight: 56.2 kg (124 lb)   Height: 1.6 m (5' 3\")         PSYCH: NAD, talks in full sentences     Jan 2021--UA neg             Office Visit on 11/17/2020   Component Date Value Ref Range Status      Color Urine 11/17/2020 Yellow    Final     Appearance Urine 11/17/2020 Clear    Final     Glucose Urine 11/17/2020 Negative  NEG^Negative mg/dL Final     Bilirubin Urine 11/17/2020 Negative  NEG^Negative Final     Ketones Urine 11/17/2020 Negative  NEG^Negative mg/dL Final     Specific Gravity Urine 11/17/2020 1.020  1.003 - 1.035 Final     Blood Urine 11/17/2020 Small* NEG^Negative Final     pH Urine 11/17/2020 5.5  5.0 - 7.0 pH Final     Protein Albumin Urine 11/17/2020 Negative  NEG^Negative mg/dL Final     Urobilinogen Urine 11/17/2020 0.2  0.2 - 1.0 EU/dL Final     Nitrite Urine 11/17/2020 Negative  NEG^Negative Final     Leukocyte Esterase Urine 11/17/2020 Negative  NEG^Negative Final     Source 11/17/2020 Midstream Urine    Final     WBC Urine 11/17/2020 0 - 5  OTO5^0 - 5 /HPF Final     RBC Urine 11/17/2020 5-10* OTO2^O - 2 /HPF Final     Squamous Epithelial /LPF Urine 11/17/2020 Few  FEW^Few /LPF Final     Bacteria Urine 11/17/2020 Few* NEG^Negative /HPF Final     Specimen Description 11/17/2020 Vagina    Final     Wet Prep 11/17/2020 No Trichomonas seen    Final     Wet Prep 11/17/2020 No clue cells seen    Final     Wet Prep 11/17/2020 No yeast seen    Final     Wet Prep 11/17/2020     Final                    Value:Few  WBC'S seen            IMAGING:   CT ABDOMEN AND PELVIS WITHOUT CONTRAST 11/25/2020 8:03 AM     CLINICAL HISTORY: Flank pain, stone disease suspected - right. Acute  right-sided low back pain without sciatica. Surgical history of  hysterectomy and appendectomy.     TECHNIQUE: CT scan of the abdomen and pelvis was performed without IV  contrast. Multiplanar reformats were obtained. Dose reduction  techniques were used.     CONTRAST: None.     COMPARISON: CT abdomen and pelvis " dated 12/23/2005. Limited abdominal  ultrasound dated 2/20/2019.     FINDINGS:   LOWER CHEST: 0.3 cm benign calcified granuloma is seen in the  posterior right lung base. There is minimal dependent atelectasis in  the posterior bilateral lung bases. Visualized portions of the lung  bases and mediastinal contents are otherwise grossly unremarkable.     HEPATOBILIARY: Normal.     PANCREAS: Normal.     SPLEEN: Normal.     ADRENAL GLANDS: Normal.     KIDNEYS/BLADDER: Hypoattenuating lesion in the posterior cortex of the  superior pole of the left kidney measures up to 2.3 cm in diameter and  has significantly increased in size since the prior study dated 2005.  It has an average density of 5 Hounsfield units and is most consistent  with a cyst. The kidneys are otherwise normal in appearance for a  noncontrast CT. There is mild left hydronephrosis and proximal left  hydroureter. There is a tiny density at the left ureterovesicular  junction measuring less than 0.2 cm (image 185 series 4). This could  represent a tiny distal left ureterovesicular junction calculus. The  mid to distal ureter on the left is normal in caliber. There is no  hydronephrosis, hydroureter or, nephrolithiasis or ureteral calculus  in the right urinary collecting system. Urinary bladder is otherwise  normal appearance in for a noncontrast CT.     BOWEL: No significant air is seen in the colon from the hepatic  flexure distally. This could represent mild colon wall thickening but  could also be due to incomplete distention of the colon. Colitis is  difficult to entirely exclude. The colon is otherwise grossly of  normal caliber without pericolonic inflammatory change to suggest  acute diverticulitis. Appendix is not seen, consistent with surgical  history. No pericecal inflammatory change to suggest acute  appendicitis. Small bowel is grossly of normal caliber. Stomach is  distended by a large amount of ingested material but is  otherwise  unremarkable.     LYMPH NODES: No lymphadenopathy is seen.     VASCULATURE: There is nonaneurysmal atherosclerosis.     PELVIC ORGANS:  Structures which could represent normal ovaries are  seen in the bilateral adnexal regions. These appear slightly atrophic.     OTHER: No free fluid or free air is seen in the peritoneal cavity.  There is a tiny fat-containing umbilical hernia, similar to the prior  study.     MUSCULOSKELETAL: No aggressive osseous lesions or acute osseous  fractures are identified.                                                                      IMPRESSION:   1.  Tiny density at the left ureterovesicular junction could represent  a tiny distal ureterovesicular junction calculus, but could also  represent artifact. This does not appear to be causing significant  obstruction. Mild prominence of the left intrarenal collecting system  and proximal left ureter are noted but the mid to distal left ureter  is normal in caliber. No evidence for obstruction or calculus is seen  in the right urinary collecting system.  2.  Relative paucity of gas in the colon from the hepatic flexure  distally and questionable colon wall thickening throughout the same  region. This could be due to incomplete distention but diffuse colitis  is difficult to exclude. Recommend clinical correlation.  3.  Significantly increased size of cyst in the posterior aspect of  the superior pole of the left kidney.  4.  Evaluation of the solid organs of the abdomen and pelvis is  limited due to lack of IV contrast.     ASSESSMENT and PLAN:    60 year old female with micro hematuria x1  -UA micro normal.   -Repeat UA with micro in 1 year. Call if concerns or gross hematuria.     Mary Gonzalez PA-C  [facility]

## 2021-01-18 NOTE — PROGRESS NOTES
"    Mary is a 60 year old who is being evaluated via a billable telephone visit.      What phone number would you like to be contacted at? [phone]  How would you like to obtain your AVS? Mail a copy      Phone call duration: 5 minutes    Review of external notes as documented above /below  Independent interpretation of a test performed by another physician/other qualified health care professional (not separately reported) - UA micro      6 min spent on the date of the encounter in chart review, patient visit, review of tests, documentation and/or discussion with other providers about the issues documented above.           CC: \"Hematuria\".     HPI: It is a pleasure to see Ms. Mary Carbajal, a 60 year old female, seen in follow up of micro hematuria.      The patient denies any gross hematuria.  Ms. Carbajal voids without difficulty.  She currently denies any dysuria, pyuria, hesitancy, intermittency, feelings of incomplete emptying, or any recent history of urinary tract infections or stones.     CT notes renal cyst. Only one UA with micro hematuria.     The patient presents today with an  whom aids in obtaining the history and facilitating the visit. Recheck UA micro was neg.         Hematuria Risk Factors:  Age >40: Yes                                       Smoking history: no  Occupational exposure to chemicals or dyes (ie, benzenes, aromatic amines): no  History of urologic disorder or disease: no  History of irritative voiding symptoms: no  History of urinary tract infection: no  Analgesic abuse: no  History of pelvic irradiation: no     Past Medical History        Past Medical History:   Diagnosis Date     Anxiety 1/29/2020     CARDIOVASCULAR SCREENING; LDL GOAL LESS THAN 160 10/31/2010     Cervicalgia 9/14/2018     Chronic allergic rhinitis due to other allergic trigger, unspecified seasonality 9/14/2018     Constipation, unspecified constipation type 9/14/2018     Family history of diabetes " mellitus 10/28/2013     GERD (gastroesophageal reflux disease) 3/17/2011     History of neck pain 1/19/2019     Hx of abdominal pain 1/19/2019     Insomnia, unspecified type 10/12/2018     Mild single current episode of major depressive disorder (H) 9/14/2018     Non-seasonal allergic rhinitis 9/14/2018     Skin macule 10/12/2018     Vaginal atrophy 10/8/2015         Past Surgical History         Past Surgical History:   Procedure Laterality Date     COLONOSCOPY N/A 3/15/2019     Procedure: COLONOSCOPY & EGD;  Surgeon: Orlin Chaudhry MD;  Location: [facility] GI     ESOPHAGOSCOPY, GASTROSCOPY, DUODENOSCOPY (EGD), COMBINED N/A 3/15/2019     Procedure: ESOPHAGOSCOPY, GASTROSCOPY, DUODENOSCOPY (EGD) ([facility]);  Surgeon: Orlin Chaudhry MD;  Location:  GI     HYSTERECTOMY, VAGINAL         LAPAROSCOPIC APPENDECTOMY             Current Outpatient Prescriptions          Current Outpatient Medications   Medication Sig Dispense Refill     cholecalciferol 2000 units tablet Take 1 tablet by mouth daily 90 tablet 1     cyclobenzaprine (FLEXERIL) 10 MG tablet Take 1 tablet (10 mg) by mouth 3 times daily as needed for muscle spasms (Patient not taking: Reported on 12/15/2020) 30 tablet 0     fluticasone (FLONASE) 50 MCG/ACT nasal spray Spray 1 spray into both nostrils daily (Patient not taking: Reported on 12/15/2020) 16 g 11     ipratropium (ATROVENT) 0.06 % nasal spray Spray 2 sprays into both nostrils 4 times daily (Patient not taking: Reported on 12/15/2020) 15 mL 11     ranitidine (ZANTAC) 300 MG tablet           tamsulosin (FLOMAX) 0.4 MG capsule Take 1 capsule (0.4 mg) by mouth daily (Patient not taking: Reported on 12/15/2020) 30 capsule 0               Allergies   Allergen Reactions     Sinus & Allergy [Pseudoephedrine]         Gets sinuses from pollin       FAMILY HISTORY: There is no reported history of genitourinary carcinoma.  There is no history of urolithiasis.       Social History   Social History           "  Socioeconomic History     Marital status:        Spouse name: Sebastian     Number of children: 4     Years of education: Not on file     Highest education level: Not on file   Occupational History     Occupation: housekeeping   Social Needs     Financial resource strain: Not hard at all     Food insecurity       Worry: Never true       Inability: Never true     Transportation needs       Medical: No       Non-medical: No   Tobacco Use     Smoking status: Never Smoker     Smokeless tobacco: Never Used   Substance and Sexual Activity     Alcohol use: No       Alcohol/week: 0.0 standard drinks       Frequency: Never       Drinks per session: Patient refused     Drug use: No     Sexual activity: Yes       Partners: Male   Lifestyle     Physical activity       Days per week: 4 days       Minutes per session: 120 min     Stress: Not on file   Relationships     Social connections       Talks on phone: Three times a week       Gets together: Three times a week       Attends Congregation service: More than 4 times per year       Active member of club or organization: Yes       Attends meetings of clubs or organizations: 1 to 4 times per year       Relationship status:      Intimate partner violence       Fear of current or ex partner: Not on file       Emotionally abused: Not on file       Physically abused: Not on file       Forced sexual activity: Not on file   Other Topics Concern     Parent/sibling w/ CABG, MI or angioplasty before 65F 55M? No   Social History Narrative     Not on file            ROS: A comprehensive 8 point ROS was obtained and negative except for that outlined above in the HPI.     PHYSICAL EXAM:   Vitals       Vitals:     12/15/20 0856   Weight: 56.2 kg (124 lb)   Height: 1.6 m (5' 3\")         PSYCH: NAD, talks in full sentences     Jan 2021--UA neg             Office Visit on 11/17/2020   Component Date Value Ref Range Status      Color Urine 11/17/2020 Yellow    Final     Appearance Urine " 11/17/2020 Clear    Final     Glucose Urine 11/17/2020 Negative  NEG^Negative mg/dL Final     Bilirubin Urine 11/17/2020 Negative  NEG^Negative Final     Ketones Urine 11/17/2020 Negative  NEG^Negative mg/dL Final     Specific Gravity Urine 11/17/2020 1.020  1.003 - 1.035 Final     Blood Urine 11/17/2020 Small* NEG^Negative Final     pH Urine 11/17/2020 5.5  5.0 - 7.0 pH Final     Protein Albumin Urine 11/17/2020 Negative  NEG^Negative mg/dL Final     Urobilinogen Urine 11/17/2020 0.2  0.2 - 1.0 EU/dL Final     Nitrite Urine 11/17/2020 Negative  NEG^Negative Final     Leukocyte Esterase Urine 11/17/2020 Negative  NEG^Negative Final     Source 11/17/2020 Midstream Urine    Final     WBC Urine 11/17/2020 0 - 5  OTO5^0 - 5 /HPF Final     RBC Urine 11/17/2020 5-10* OTO2^O - 2 /HPF Final     Squamous Epithelial /LPF Urine 11/17/2020 Few  FEW^Few /LPF Final     Bacteria Urine 11/17/2020 Few* NEG^Negative /HPF Final     Specimen Description 11/17/2020 Vagina    Final     Wet Prep 11/17/2020 No Trichomonas seen    Final     Wet Prep 11/17/2020 No clue cells seen    Final     Wet Prep 11/17/2020 No yeast seen    Final     Wet Prep 11/17/2020     Final                    Value:Few  WBC'S seen            IMAGING:   CT ABDOMEN AND PELVIS WITHOUT CONTRAST 11/25/2020 8:03 AM     CLINICAL HISTORY: Flank pain, stone disease suspected - right. Acute  right-sided low back pain without sciatica. Surgical history of  hysterectomy and appendectomy.     TECHNIQUE: CT scan of the abdomen and pelvis was performed without IV  contrast. Multiplanar reformats were obtained. Dose reduction  techniques were used.     CONTRAST: None.     COMPARISON: CT abdomen and pelvis dated 12/23/2005. Limited abdominal  ultrasound dated 2/20/2019.     FINDINGS:   LOWER CHEST: 0.3 cm benign calcified granuloma is seen in the  posterior right lung base. There is minimal dependent atelectasis in  the posterior bilateral lung bases. Visualized portions of the  lung  bases and mediastinal contents are otherwise grossly unremarkable.     HEPATOBILIARY: Normal.     PANCREAS: Normal.     SPLEEN: Normal.     ADRENAL GLANDS: Normal.     KIDNEYS/BLADDER: Hypoattenuating lesion in the posterior cortex of the  superior pole of the left kidney measures up to 2.3 cm in diameter and  has significantly increased in size since the prior study dated 2005.  It has an average density of 5 Hounsfield units and is most consistent  with a cyst. The kidneys are otherwise normal in appearance for a  noncontrast CT. There is mild left hydronephrosis and proximal left  hydroureter. There is a tiny density at the left ureterovesicular  junction measuring less than 0.2 cm (image 185 series 4). This could  represent a tiny distal left ureterovesicular junction calculus. The  mid to distal ureter on the left is normal in caliber. There is no  hydronephrosis, hydroureter or, nephrolithiasis or ureteral calculus  in the right urinary collecting system. Urinary bladder is otherwise  normal appearance in for a noncontrast CT.     BOWEL: No significant air is seen in the colon from the hepatic  flexure distally. This could represent mild colon wall thickening but  could also be due to incomplete distention of the colon. Colitis is  difficult to entirely exclude. The colon is otherwise grossly of  normal caliber without pericolonic inflammatory change to suggest  acute diverticulitis. Appendix is not seen, consistent with surgical  history. No pericecal inflammatory change to suggest acute  appendicitis. Small bowel is grossly of normal caliber. Stomach is  distended by a large amount of ingested material but is otherwise  unremarkable.     LYMPH NODES: No lymphadenopathy is seen.     VASCULATURE: There is nonaneurysmal atherosclerosis.     PELVIC ORGANS:  Structures which could represent normal ovaries are  seen in the bilateral adnexal regions. These appear slightly atrophic.     OTHER: No free fluid or  free air is seen in the peritoneal cavity.  There is a tiny fat-containing umbilical hernia, similar to the prior  study.     MUSCULOSKELETAL: No aggressive osseous lesions or acute osseous  fractures are identified.                                                                      IMPRESSION:   1.  Tiny density at the left ureterovesicular junction could represent  a tiny distal ureterovesicular junction calculus, but could also  represent artifact. This does not appear to be causing significant  obstruction. Mild prominence of the left intrarenal collecting system  and proximal left ureter are noted but the mid to distal left ureter  is normal in caliber. No evidence for obstruction or calculus is seen  in the right urinary collecting system.  2.  Relative paucity of gas in the colon from the hepatic flexure  distally and questionable colon wall thickening throughout the same  region. This could be due to incomplete distention but diffuse colitis  is difficult to exclude. Recommend clinical correlation.  3.  Significantly increased size of cyst in the posterior aspect of  the superior pole of the left kidney.  4.  Evaluation of the solid organs of the abdomen and pelvis is  limited due to lack of IV contrast.     ASSESSMENT and PLAN:    60 year old female with micro hematuria x1  -UA micro normal.   -Repeat UA with micro in 1 year. Call if concerns or gross hematuria.     ESTELA Connor [facility]

## 2021-02-04 ENCOUNTER — OFFICE VISIT (OUTPATIENT)
Dept: FAMILY MEDICINE | Facility: CLINIC | Age: 61
End: 2021-02-04
Payer: COMMERCIAL

## 2021-02-04 VITALS
OXYGEN SATURATION: 99 % | SYSTOLIC BLOOD PRESSURE: 130 MMHG | WEIGHT: 128 LBS | TEMPERATURE: 97.9 F | DIASTOLIC BLOOD PRESSURE: 77 MMHG | BODY MASS INDEX: 22.67 KG/M2 | HEART RATE: 67 BPM

## 2021-02-04 DIAGNOSIS — J30.2 SEASONAL ALLERGIES: ICD-10-CM

## 2021-02-04 DIAGNOSIS — M26.609 TMJ (TEMPOROMANDIBULAR JOINT SYNDROME): Primary | ICD-10-CM

## 2021-02-04 DIAGNOSIS — K12.0 APHTHAE, ORAL: ICD-10-CM

## 2021-02-04 PROBLEM — J30.89 NON-SEASONAL ALLERGIC RHINITIS: Status: RESOLVED | Noted: 2018-09-14 | Resolved: 2021-02-04

## 2021-02-04 PROCEDURE — 99214 OFFICE O/P EST MOD 30 MIN: CPT | Performed by: FAMILY MEDICINE

## 2021-02-04 RX ORDER — SUCRALFATE ORAL 1 G/10ML
1 SUSPENSION ORAL 4 TIMES DAILY
Qty: 420 ML | Refills: 0 | Status: SHIPPED | OUTPATIENT
Start: 2021-02-04 | End: 2021-02-24

## 2021-02-04 RX ORDER — FLUTICASONE PROPIONATE 50 MCG
1 SPRAY, SUSPENSION (ML) NASAL DAILY
Qty: 16 G | Refills: 11 | Status: SHIPPED | OUTPATIENT
Start: 2021-02-04 | End: 2021-08-13

## 2021-02-04 RX ORDER — NAPROXEN 500 MG/1
500 TABLET ORAL 2 TIMES DAILY WITH MEALS
Qty: 30 TABLET | Refills: 0 | Status: SHIPPED | OUTPATIENT
Start: 2021-02-04 | End: 2021-11-09

## 2021-02-04 RX ORDER — IPRATROPIUM BROMIDE 42 UG/1
2 SPRAY, METERED NASAL 4 TIMES DAILY
Qty: 15 ML | Refills: 11 | Status: SHIPPED | OUTPATIENT
Start: 2021-02-04 | End: 2021-11-09

## 2021-02-04 NOTE — PROGRESS NOTES
"  Assessment & Plan   Problem List Items Addressed This Visit     Aphthae, oral     10 days.  Exam consistent.  Carafate         Relevant Medications    sucralfate (CARAFATE) 1 GM/10ML suspension    Seasonal allergies     She requests refills of her nasal sprays, effective.  Continue         Relevant Medications    ipratropium (ATROVENT) 0.06 % nasal spray    fluticasone (FLONASE) 50 MCG/ACT nasal spray    TMJ (temporomandibular joint syndrome) - Primary     Right-sided, attributed to ear by patient.  Temporally associated with aphthae.  Discussed differential, treatment options.  Nonsteroidals         Relevant Medications    naproxen (NAPROSYN) 500 MG tablet                                          Return in about 7 months (around 9/4/2021) for recheck.    David Bustos MD  [facility]    Paulo Hernandez is a 60 year old who presents to clinic today for the following health issues     HPI       Acute Illness  Acute illness concerns: Ear pain   Onset/Duration: couple of weeks   Symptoms:  Fever: no  Chills/Sweats: no  Headache (location?): YES  Sinus Pressure: YES  Conjunctivitis:  no  Ear Pain: YES: right  Rhinorrhea: YES  Congestion: no  Sore Throat: no  Cough: no  Wheeze: no  Decreased Appetite: no  Nausea: no  Vomiting: no  Diarrhea: no  Dysuria/Freq.: no  Dysuria or Hematuria: no  Fatigue/Achiness: YES sometimes   Sick/Strep Exposure: no  Therapies tried and outcome: None  Patient reports \"bumps\" in her mouth at times painful although not now.  Right ear pain 10 to 14 days palliative and provocative's not known  She also requests refills of her nasal sprays for \"allergies\"  Past Medical History:   Diagnosis Date     Anxiety 1/29/2020     CARDIOVASCULAR SCREENING; LDL GOAL LESS THAN 160 10/31/2010     Cervicalgia 9/14/2018     Chronic allergic rhinitis due to other allergic trigger, unspecified seasonality 9/14/2018     Constipation, unspecified constipation type 9/14/2018     " Family history of diabetes mellitus 10/28/2013     GERD (gastroesophageal reflux disease) 3/17/2011     History of neck pain 1/19/2019     Hx of abdominal pain 1/19/2019     Insomnia, unspecified type 10/12/2018     Mild single current episode of major depressive disorder (H) 9/14/2018     Non-seasonal allergic rhinitis 9/14/2018     Skin macule 10/12/2018     Vaginal atrophy 10/8/2015       Past Surgical History:   Procedure Laterality Date     COLONOSCOPY N/A 3/15/2019    Procedure: COLONOSCOPY & EGD;  Surgeon: Orlin Chaudhry MD;  Location: [facility]     ESOPHAGOSCOPY, GASTROSCOPY, DUODENOSCOPY (EGD), COMBINED N/A 3/15/2019    Procedure: ESOPHAGOSCOPY, GASTROSCOPY, DUODENOSCOPY (EGD) (FV);  Surgeon: Orlin Chaudhry MD;  Location: [facility]     HYSTERECTOMY, VAGINAL       LAPAROSCOPIC APPENDECTOMY         Family History   Problem Relation Age of Onset     Diabetes Mother      Diabetes Brother      Colorectal Cancer Father      Diabetes Brother      Diabetes Brother      Colon Cancer No family hx of        Social History     Tobacco Use     Smoking status: Never Smoker     Smokeless tobacco: Never Used   Substance Use Topics     Alcohol use: No     Alcohol/week: 0.0 standard drinks     Frequency: Never     Drinks per session: Patient refused         Review of Systems     No fevers no cough        Objective    LMP  (LMP Unknown)   There is no height or weight on file to calculate BMI.   /77 (BP Location: Right arm, Patient Position: Chair, Cuff Size: Adult Regular)   Pulse 67   Temp 97.9  F (36.6  C) (Oral)   Wt 58.1 kg (128 lb)   LMP  (LMP Unknown)   SpO2 99%   BMI 22.67 kg/m      Physical Exam   Ears unremarkable  No cervical nodes  Tenderness reproduced at the right TMJ to palpation  Right buccal aphthae  Chest clear approximately 8 days duration        David Bustos MD

## 2021-02-05 NOTE — ASSESSMENT & PLAN NOTE
Right-sided, attributed to ear by patient.  Temporally associated with aphthae.  Discussed differential, treatment options.  Nonsteroidals

## 2021-02-24 DIAGNOSIS — K12.0 APHTHAE, ORAL: ICD-10-CM

## 2021-02-24 RX ORDER — SUCRALFATE ORAL 1 G/10ML
1 SUSPENSION ORAL 4 TIMES DAILY
Qty: 420 ML | Refills: 3 | Status: SHIPPED | OUTPATIENT
Start: 2021-02-24 | End: 2021-11-09

## 2021-08-13 ENCOUNTER — OFFICE VISIT (OUTPATIENT)
Dept: URGENT CARE | Facility: URGENT CARE | Age: 61
End: 2021-08-13
Payer: COMMERCIAL

## 2021-08-13 VITALS
WEIGHT: 125 LBS | HEART RATE: 68 BPM | BODY MASS INDEX: 22.14 KG/M2 | SYSTOLIC BLOOD PRESSURE: 118 MMHG | DIASTOLIC BLOOD PRESSURE: 62 MMHG | TEMPERATURE: 98.3 F | OXYGEN SATURATION: 98 %

## 2021-08-13 DIAGNOSIS — J30.2 SEASONAL ALLERGIES: ICD-10-CM

## 2021-08-13 DIAGNOSIS — K21.9 GASTROESOPHAGEAL REFLUX DISEASE WITHOUT ESOPHAGITIS: Primary | ICD-10-CM

## 2021-08-13 PROCEDURE — 99213 OFFICE O/P EST LOW 20 MIN: CPT | Performed by: FAMILY MEDICINE

## 2021-08-13 RX ORDER — FAMOTIDINE 10 MG
10 TABLET ORAL 2 TIMES DAILY
Qty: 60 TABLET | Refills: 0 | Status: SHIPPED | OUTPATIENT
Start: 2021-08-13 | End: 2021-11-09

## 2021-08-13 RX ORDER — FLUTICASONE PROPIONATE 50 MCG
1 SPRAY, SUSPENSION (ML) NASAL DAILY
Qty: 16 G | Refills: 0 | Status: SHIPPED | OUTPATIENT
Start: 2021-08-13 | End: 2021-11-09

## 2021-08-13 NOTE — PROGRESS NOTES
Assessment & Plan       Seasonal allergies  - fluticasone (FLONASE) 50 MCG/ACT nasal spray  Dispense: 16 g; Refill: 0  Refill provided - patient having difficulty with access to primary care but will make appt for annual exam    Gastroesophageal reflux disease without esophagitis  - famotidine (PEPCID) 10 MG tablet  Dispense: 60 tablet; Refill: 0       Consider H. Pylori testing in the future if symptoms persist or show no improvement. Benign abdominal exam - symptoms not suggestive of cholecystitis/appendicitis. Start famotidine twice daily. F/u with PCP    Bryn Carlos MD   [facility]    Paulo Hernandez is a 61 year old female who presents to clinic today for the following health issues:  Chief Complaint   Patient presents with     Urgent Care     Stomach pain- upper gastric area after eating with indigestion and bloating x 2 weeks     HPI    No hx of abdominal surgeries  Has not had any fevers    Episode of emesis three times early last week    She has a bowel movement every 1-2 days.   Eating appears to trigger her symptoms. She does not eat much spicy foods. She tries to avoid acidic foods such as limes/tomatoes.     Meds attempted: tums/milk    Has about 2 meals a day  Has had endoscopy twice last one in last couple years which showed no ulcers.       Her  accompanies her today    Patient Active Problem List    Diagnosis Date Noted     TMJ (temporomandibular joint syndrome) 02/04/2021     Priority: Medium     Aphthae, oral 02/04/2021     Priority: Medium     Dysesthesia 01/29/2020     Priority: Medium     Dysfunction of right eustachian tube 01/29/2020     Priority: Medium     Anxiety 01/29/2020     Priority: Medium     Insomnia, unspecified type 10/12/2018     Priority: Medium     Skin macule 10/12/2018     Priority: Medium     Mild single current episode of major depressive disorder (H) 09/14/2018     Priority: Medium     Constipation, unspecified constipation type 09/14/2018      Priority: Medium     Cervicalgia 09/14/2018     Priority: Medium     Vaginal atrophy 10/08/2015     Priority: Medium     Family history of diabetes mellitus 10/28/2013     Priority: Medium     GERD (gastroesophageal reflux disease) 03/17/2011     Priority: Medium     CARDIOVASCULAR SCREENING; LDL GOAL LESS THAN 160 10/31/2010     Priority: Medium     Seasonal allergies 08/11/2010     Priority: Medium       Current Outpatient Medications   Medication     cholecalciferol 2000 units tablet     famotidine (PEPCID) 10 MG tablet     fluticasone (FLONASE) 50 MCG/ACT nasal spray     ipratropium (ATROVENT) 0.06 % nasal spray     naproxen (NAPROSYN) 500 MG tablet     sucralfate (CARAFATE) 1 GM/10ML suspension     No current facility-administered medications for this visit.           Objective    /62   Pulse 68   Temp 98.3  F (36.8  C) (Oral)   Wt 56.7 kg (125 lb)   LMP  (LMP Unknown)   SpO2 98%   BMI 22.14 kg/m    Physical Exam   GEN: NAD  Abd: no guarding, corcoran's and rovsing's sign negative, normoactive    No results found for any visits on 08/13/21.                  The use of Dragon/[employer] dictation services may have been used to construct the content in this note; any grammatical or spelling errors are non-intentional. Please contact the author of this note directly if you are in need of any clarification.

## 2021-08-13 NOTE — PATIENT INSTRUCTIONS
Over the counter famotidine (acid reducing medication )  twice a day        If symptoms are not better in the next 5 days make an appointment to see your doctor        If your symptoms get worse (fever, pain, vomiting) please come in right away to be evaluated

## 2021-08-18 ENCOUNTER — OFFICE VISIT (OUTPATIENT)
Dept: FAMILY MEDICINE | Facility: CLINIC | Age: 61
End: 2021-08-18
Payer: COMMERCIAL

## 2021-08-18 VITALS
BODY MASS INDEX: 22.5 KG/M2 | TEMPERATURE: 98.7 F | OXYGEN SATURATION: 98 % | WEIGHT: 127 LBS | SYSTOLIC BLOOD PRESSURE: 118 MMHG | HEART RATE: 68 BPM | DIASTOLIC BLOOD PRESSURE: 58 MMHG

## 2021-08-18 DIAGNOSIS — R10.13 EPIGASTRIC PAIN: ICD-10-CM

## 2021-08-18 DIAGNOSIS — A09 TRAVELER'S DIARRHEA: Primary | ICD-10-CM

## 2021-08-18 PROCEDURE — 99213 OFFICE O/P EST LOW 20 MIN: CPT | Performed by: PHYSICIAN ASSISTANT

## 2021-08-18 RX ORDER — AZITHROMYCIN 500 MG/1
TABLET, FILM COATED ORAL
Qty: 3 TABLET | Refills: 0 | Status: SHIPPED | OUTPATIENT
Start: 2021-08-18 | End: 2021-11-09

## 2021-08-18 NOTE — PROGRESS NOTES
Assessment & Plan     Traveler's diarrhea    Possible traveler's diarrhea. Treat with zithromax.    - azithromycin (ZITHROMAX) 500 MG tablet; Take one tablet daily for up to 3 days      Epigastric pain    Test for H pylori. Continue famotidine.    - Helicobacter pylori Antigen Stool; Future  - Helicobacter pylori Antigen Stool             There are no Patient Instructions on file for this visit.    No follow-ups on file.    ESTELA [facility]    Paulo Hernandez is a 61 year old who presents for the following health issues     HPI     Abdominal/Flank Pain  Onset/Duration: two weeks   Description:   Character: Sharp  Location: upper abdominal region   Radiation: Back when the pt is sleeping   Intensity: moderate  Progression of Symptoms: same  Accompanying Signs & Symptoms:  Fever/Chills: YES- chills   Gas/Bloating: YES- gas   Nausea: YES  Vomitting: YES- 08/10/21  Diarrhea: YES- pt did previously had diarrhea then took a medication to stop it   Constipation: YES  Dysuria or Hematuria: no  History:   Trauma: no  Previous similar pain: no  Previous tests done: none  Precipitating factors:   Does the pain change with:     Food: YES- pain gets worse     Bowel Movement: no    Urination: no   Other factors:  no  Therapies tried and outcome: tums, famotidine with no relief   No LMP recorded (lmp unknown). Patient has had a hysterectomy.    Recommended stool sample for H pylori at urgent care. Recently traveled to [facility]. Symptoms started 2 days after returning. She had vomiting, diarrhea, and abdominal pain.       Review of Systems   Constitutional, HEENT, cardiovascular, pulmonary, gi and gu systems are negative, except as otherwise noted.        Objective    /58 (BP Location: Right arm, Patient Position: Chair, Cuff Size: Adult Regular)   Pulse 68   Temp 98.7  F (37.1  C) (Oral)   Wt 57.6 kg (127 lb)   LMP  (LMP Unknown)   SpO2 98%   BMI 22.50 kg/m    Body  mass index is 22.5 kg/m .       Physical Exam   GENERAL: healthy, alert and no distress  EYES: Eyes grossly normal to inspection, PERRL and conjunctivae and sclerae normal  RESP: lungs clear to auscultation - no rales, rhonchi or wheezes  CV: regular rate and rhythm, normal S1 S2, no S3 or S4, no murmur, click or rub, no peripheral edema and peripheral pulses strong  ABDOMEN: Mildly tender to palpation in epigastric area. No guarding or rebound tenderness. Otherwise soft, nontender, no hepatosplenomegaly, no masses and bowel sounds normal  MS: no gross musculoskeletal defects noted, no edema  SKIN: no suspicious lesions or rashes  NEURO: Normal strength and tone, mentation intact and speech normal  PSYCH: mentation appears normal, affect normal/bright

## 2021-08-19 ENCOUNTER — APPOINTMENT (OUTPATIENT)
Dept: LAB | Facility: CLINIC | Age: 61
End: 2021-08-19
Payer: COMMERCIAL

## 2021-08-19 PROCEDURE — 87338 HPYLORI STOOL AG IA: CPT | Performed by: PHYSICIAN ASSISTANT

## 2021-08-20 LAB — H PYLORI AG STL QL IA: NEGATIVE

## 2021-08-30 NOTE — ASSESSMENT & PLAN NOTE
10 days.  Exam consistent.  Carafate   If you receive a survey via e-mail or mail, please take the time to complete and return as your feedback is very helpful to our practice.     ########################################    If your neurological testing is not going to be scheduled today our Pre-Service Department will contact you Christofer schedule within one to two days. If you would like to call and schedule when it is convenient for you or if you need to reschedule your appointment please call the Pre-Service Department at [phone].    Your tests will be reviewed by the Benefits Department and if there are any concerns regarding prior authorization or financial obligation they will contact you. We recommend you still contact your insurance company to see if the test, provider, and location of service are covered, and if so, will there be any out of pocket expenses.     If you should have any concerns regarding the financial obligation of the tests please contact our Financial Advocates at [phone] and they will be happy to assist you.                 Thank you for letting us care for you today.       #######################################    In order to make sure we are meeting our patients' needs, we require a 36 hour notice from you prior to picking up your medications. Attached is a table that will help you determine when your prescriptions will be ready for pick-up.                      If the refill is requested between when the clinic opens and 12 pm on  You can  your prescription at the pharmacy after 6 PM on   Monday Tuesday Tuesday Wednesday Wednesday Thursday Thursday Friday Friday Monday     If the refill is requested between 12 pm and after the clinic closes on You can  your prescription at the pharmacy after 12 PM on   Monday Wednesday Tuesday Thursday Wednesday Friday Thursday Monday Friday Tuesday

## 2021-10-05 ENCOUNTER — ANCILLARY PROCEDURE (OUTPATIENT)
Dept: MAMMOGRAPHY | Facility: CLINIC | Age: 61
End: 2021-10-05
Payer: COMMERCIAL

## 2021-10-05 DIAGNOSIS — Z12.31 VISIT FOR SCREENING MAMMOGRAM: ICD-10-CM

## 2021-10-05 PROCEDURE — 77063 BREAST TOMOSYNTHESIS BI: CPT | Mod: TC | Performed by: RADIOLOGY

## 2021-10-05 PROCEDURE — 77067 SCR MAMMO BI INCL CAD: CPT | Mod: TC | Performed by: RADIOLOGY

## 2021-11-09 ENCOUNTER — OFFICE VISIT (OUTPATIENT)
Dept: FAMILY MEDICINE | Facility: CLINIC | Age: 61
End: 2021-11-09
Payer: COMMERCIAL

## 2021-11-09 VITALS
HEIGHT: 63 IN | BODY MASS INDEX: 22.68 KG/M2 | OXYGEN SATURATION: 98 % | SYSTOLIC BLOOD PRESSURE: 119 MMHG | TEMPERATURE: 98.5 F | DIASTOLIC BLOOD PRESSURE: 71 MMHG | HEART RATE: 58 BPM | WEIGHT: 128 LBS

## 2021-11-09 DIAGNOSIS — J30.2 SEASONAL ALLERGIES: ICD-10-CM

## 2021-11-09 DIAGNOSIS — K21.9 GASTROESOPHAGEAL REFLUX DISEASE WITHOUT ESOPHAGITIS: ICD-10-CM

## 2021-11-09 DIAGNOSIS — Z00.00 ROUTINE GENERAL MEDICAL EXAMINATION AT A HEALTH CARE FACILITY: Primary | ICD-10-CM

## 2021-11-09 DIAGNOSIS — M65.4 RADIAL STYLOID TENOSYNOVITIS OF LEFT HAND: ICD-10-CM

## 2021-11-09 DIAGNOSIS — Z23 COVID-19 VACCINE ADMINISTERED: ICD-10-CM

## 2021-11-09 DIAGNOSIS — Z23 NEED FOR PROPHYLACTIC VACCINATION AND INOCULATION AGAINST INFLUENZA: ICD-10-CM

## 2021-11-09 DIAGNOSIS — M54.2 CERVICALGIA: Chronic | ICD-10-CM

## 2021-11-09 LAB
BASOPHILS # BLD AUTO: 0 10E3/UL (ref 0–0.2)
BASOPHILS NFR BLD AUTO: 0 %
EOSINOPHIL # BLD AUTO: 0 10E3/UL (ref 0–0.7)
EOSINOPHIL NFR BLD AUTO: 0 %
ERYTHROCYTE [DISTWIDTH] IN BLOOD BY AUTOMATED COUNT: 14.9 % (ref 10–15)
HCT VFR BLD AUTO: 39 % (ref 35–47)
HGB BLD-MCNC: 13.3 G/DL (ref 11.7–15.7)
LYMPHOCYTES # BLD AUTO: 3.5 10E3/UL (ref 0.8–5.3)
LYMPHOCYTES NFR BLD AUTO: 43 %
MCH RBC QN AUTO: 27 PG (ref 26.5–33)
MCHC RBC AUTO-ENTMCNC: 34.1 G/DL (ref 31.5–36.5)
MCV RBC AUTO: 79 FL (ref 78–100)
MONOCYTES # BLD AUTO: 0.6 10E3/UL (ref 0–1.3)
MONOCYTES NFR BLD AUTO: 7 %
NEUTROPHILS # BLD AUTO: 4.1 10E3/UL (ref 1.6–8.3)
NEUTROPHILS NFR BLD AUTO: 50 %
PLATELET # BLD AUTO: 222 10E3/UL (ref 150–450)
RBC # BLD AUTO: 4.93 10E6/UL (ref 3.8–5.2)
WBC # BLD AUTO: 8.3 10E3/UL (ref 4–11)

## 2021-11-09 PROCEDURE — 0004A PR COVID VAC PFIZER DIL RECON 30 MCG/0.3 ML IM: CPT | Performed by: FAMILY MEDICINE

## 2021-11-09 PROCEDURE — 90471 IMMUNIZATION ADMIN: CPT | Performed by: FAMILY MEDICINE

## 2021-11-09 PROCEDURE — 36415 COLL VENOUS BLD VENIPUNCTURE: CPT | Performed by: FAMILY MEDICINE

## 2021-11-09 PROCEDURE — 99396 PREV VISIT EST AGE 40-64: CPT | Mod: 25 | Performed by: FAMILY MEDICINE

## 2021-11-09 PROCEDURE — 80053 COMPREHEN METABOLIC PANEL: CPT | Performed by: FAMILY MEDICINE

## 2021-11-09 PROCEDURE — 99213 OFFICE O/P EST LOW 20 MIN: CPT | Mod: 25 | Performed by: FAMILY MEDICINE

## 2021-11-09 PROCEDURE — 85025 COMPLETE CBC W/AUTO DIFF WBC: CPT | Performed by: FAMILY MEDICINE

## 2021-11-09 PROCEDURE — 91300 PR COVID VAC PFIZER DIL RECON 30 MCG/0.3 ML IM: CPT | Performed by: FAMILY MEDICINE

## 2021-11-09 PROCEDURE — 90686 IIV4 VACC NO PRSV 0.5 ML IM: CPT | Performed by: FAMILY MEDICINE

## 2021-11-09 RX ORDER — FLUTICASONE PROPIONATE 50 MCG
1 SPRAY, SUSPENSION (ML) NASAL DAILY
Qty: 16 G | Refills: 11 | Status: SHIPPED | OUTPATIENT
Start: 2021-11-09 | End: 2022-11-16

## 2021-11-09 RX ORDER — IPRATROPIUM BROMIDE 42 UG/1
2 SPRAY, METERED NASAL 4 TIMES DAILY
Qty: 15 ML | Refills: 11 | Status: SHIPPED | OUTPATIENT
Start: 2021-11-09 | End: 2022-11-16

## 2021-11-09 RX ORDER — FAMOTIDINE 40 MG/1
40 TABLET, FILM COATED ORAL AT BEDTIME
Qty: 90 TABLET | Refills: 3 | Status: SHIPPED | OUTPATIENT
Start: 2021-11-09 | End: 2022-11-16

## 2021-11-09 SDOH — ECONOMIC STABILITY: INCOME INSECURITY: IN THE LAST 12 MONTHS, WAS THERE A TIME WHEN YOU WERE NOT ABLE TO PAY THE MORTGAGE OR RENT ON TIME?: NO

## 2021-11-09 SDOH — ECONOMIC STABILITY: FOOD INSECURITY: WITHIN THE PAST 12 MONTHS, THE FOOD YOU BOUGHT JUST DIDN'T LAST AND YOU DIDN'T HAVE MONEY TO GET MORE.: NEVER TRUE

## 2021-11-09 SDOH — HEALTH STABILITY: PHYSICAL HEALTH: ON AVERAGE, HOW MANY DAYS PER WEEK DO YOU ENGAGE IN MODERATE TO STRENUOUS EXERCISE (LIKE A BRISK WALK)?: 4 DAYS

## 2021-11-09 SDOH — ECONOMIC STABILITY: FOOD INSECURITY: WITHIN THE PAST 12 MONTHS, YOU WORRIED THAT YOUR FOOD WOULD RUN OUT BEFORE YOU GOT MONEY TO BUY MORE.: NEVER TRUE

## 2021-11-09 SDOH — ECONOMIC STABILITY: INCOME INSECURITY: HOW HARD IS IT FOR YOU TO PAY FOR THE VERY BASICS LIKE FOOD, HOUSING, MEDICAL CARE, AND HEATING?: NOT HARD AT ALL

## 2021-11-09 SDOH — HEALTH STABILITY: PHYSICAL HEALTH: ON AVERAGE, HOW MANY MINUTES DO YOU ENGAGE IN EXERCISE AT THIS LEVEL?: 120 MIN

## 2021-11-09 ASSESSMENT — ANXIETY QUESTIONNAIRES
1. FEELING NERVOUS, ANXIOUS, OR ON EDGE: NOT AT ALL
7. FEELING AFRAID AS IF SOMETHING AWFUL MIGHT HAPPEN: SEVERAL DAYS
GAD7 TOTAL SCORE: 3
5. BEING SO RESTLESS THAT IT IS HARD TO SIT STILL: NOT AT ALL
6. BECOMING EASILY ANNOYED OR IRRITABLE: SEVERAL DAYS
7. FEELING AFRAID AS IF SOMETHING AWFUL MIGHT HAPPEN: SEVERAL DAYS
2. NOT BEING ABLE TO STOP OR CONTROL WORRYING: NOT AT ALL
4. TROUBLE RELAXING: SEVERAL DAYS
GAD7 TOTAL SCORE: 3
3. WORRYING TOO MUCH ABOUT DIFFERENT THINGS: NOT AT ALL
GAD7 TOTAL SCORE: 3
8. IF YOU CHECKED OFF ANY PROBLEMS, HOW DIFFICULT HAVE THESE MADE IT FOR YOU TO DO YOUR WORK, TAKE CARE OF THINGS AT HOME, OR GET ALONG WITH OTHER PEOPLE?: NOT DIFFICULT AT ALL

## 2021-11-09 ASSESSMENT — ENCOUNTER SYMPTOMS
HEARTBURN: 1
WEAKNESS: 1
PALPITATIONS: 0
SHORTNESS OF BREATH: 0
CHILLS: 0
EYE PAIN: 0
NAUSEA: 0
DYSURIA: 0
ABDOMINAL PAIN: 0
BREAST MASS: 0
ARTHRALGIAS: 0
MYALGIAS: 1
HEMATOCHEZIA: 0
COUGH: 0
DIZZINESS: 0
HEMATURIA: 1
FREQUENCY: 1
DIARRHEA: 1
PARESTHESIAS: 0
CONSTIPATION: 0
HEADACHES: 0
NERVOUS/ANXIOUS: 1
JOINT SWELLING: 1
FEVER: 0
SORE THROAT: 0

## 2021-11-09 ASSESSMENT — LIFESTYLE VARIABLES
HOW OFTEN DO YOU HAVE SIX OR MORE DRINKS ON ONE OCCASION: NEVER
HOW MANY STANDARD DRINKS CONTAINING ALCOHOL DO YOU HAVE ON A TYPICAL DAY: PATIENT DECLINED
HOW OFTEN DO YOU HAVE A DRINK CONTAINING ALCOHOL: NEVER

## 2021-11-09 ASSESSMENT — PATIENT HEALTH QUESTIONNAIRE - PHQ9
10. IF YOU CHECKED OFF ANY PROBLEMS, HOW DIFFICULT HAVE THESE PROBLEMS MADE IT FOR YOU TO DO YOUR WORK, TAKE CARE OF THINGS AT HOME, OR GET ALONG WITH OTHER PEOPLE: NOT DIFFICULT AT ALL
SUM OF ALL RESPONSES TO PHQ QUESTIONS 1-9: 6
SUM OF ALL RESPONSES TO PHQ QUESTIONS 1-9: 6

## 2021-11-09 ASSESSMENT — SOCIAL DETERMINANTS OF HEALTH (SDOH)
HOW OFTEN DO YOU ATTEND CHURCH OR RELIGIOUS SERVICES?: MORE THAN 4 TIMES PER YEAR
DO YOU BELONG TO ANY CLUBS OR ORGANIZATIONS SUCH AS CHURCH GROUPS UNIONS, FRATERNAL OR ATHLETIC GROUPS, OR SCHOOL GROUPS?: NO
IN A TYPICAL WEEK, HOW MANY TIMES DO YOU TALK ON THE PHONE WITH FAMILY, FRIENDS, OR NEIGHBORS?: MORE THAN THREE TIMES A WEEK
HOW OFTEN DO YOU GET TOGETHER WITH FRIENDS OR RELATIVES?: TWICE A WEEK

## 2021-11-09 ASSESSMENT — MIFFLIN-ST. JEOR: SCORE: 1124.73

## 2021-11-09 NOTE — LETTER
[facility]  [address] [place], [address]  [phone]        November 10, 2021    Mary Carbajal                                                                                                                                                       [address] [facility] [address]            Dear Joseph Hernandez isaura nguyen son normales     All tests are normal.  David Bustos MD    Results for orders placed or performed in visit on 11/09/21   Comprehensive metabolic panel (BMP + Alb, Alk Phos, ALT, AST, Total. Bili, TP)     Status: Normal   Result Value Ref Range    Sodium 136 133 - 144 mmol/L    Potassium 4.2 3.4 - 5.3 mmol/L    Chloride 105 94 - 109 mmol/L    Carbon Dioxide (CO2) 25 20 - 32 mmol/L    Anion Gap 6 3 - 14 mmol/L    Urea Nitrogen 16 7 - 30 mg/dL    Creatinine 0.89 0.52 - 1.04 mg/dL    Calcium 9.4 8.5 - 10.1 mg/dL    Glucose 99 70 - 99 mg/dL    Alkaline Phosphatase 70 40 - 150 U/L    AST 19 0 - 45 U/L    ALT 26 0 - 50 U/L    Protein Total 7.8 6.8 - 8.8 g/dL    Albumin 3.8 3.4 - 5.0 g/dL    Bilirubin Total 0.2 0.2 - 1.3 mg/dL    GFR Estimate 71 >60 mL/min/1.73m2   CBC with platelets and differential     Status: None   Result Value Ref Range    WBC Count 8.3 4.0 - 11.0 10e3/uL    RBC Count 4.93 3.80 - 5.20 10e6/uL    Hemoglobin 13.3 11.7 - 15.7 g/dL    Hematocrit 39.0 35.0 - 47.0 %    MCV 79 78 - 100 fL    MCH 27.0 26.5 - 33.0 pg    MCHC 34.1 31.5 - 36.5 g/dL    RDW 14.9 10.0 - 15.0 %    Platelet Count 222 150 - 450 10e3/uL    % Neutrophils 50 %    % Lymphocytes 43 %    % Monocytes 7 %    % Eosinophils 0 %    % Basophils 0 %    Absolute Neutrophils 4.1 1.6 - 8.3 10e3/uL    Absolute Lymphocytes 3.5 0.8 - 5.3 10e3/uL    Absolute Monocytes 0.6 0.0 - 1.3 10e3/uL    Absolute Eosinophils 0.0 0.0 - 0.7 10e3/uL    Absolute Basophils 0.0 0.0 - 0.2 10e3/uL   CBC with platelets and differential     Status: None    Narrative    The following orders were created for  panel order CBC with platelets and differential.  Procedure                               Abnormality         Status                     ---------                               -----------         ------                     CBC with platelets and d...[[id number]]                      Final result                 Please view results for these tests on the individual orders.

## 2021-11-09 NOTE — PROGRESS NOTES
SUBJECTIVE:   CC: Mary Carbajal is an 61 year old woman who presents for preventive health visit.     {  Patient has been advised of split billing requirements and indicates understanding: Yes  Healthy Habits:     Getting at least 3 servings of Calcium per day:  Yes    Bi-annual eye exam:  Yes    Dental care twice a year:  Yes    Sleep apnea or symptoms of sleep apnea:  Excessive snoring and Sleep apnea    Diet:  Regular (no restrictions)    Frequency of exercise:  2-3 days/week    Duration of exercise:  Greater than 60 minutes    Taking medications regularly:  Yes    Medication side effects:  Not applicable    PHQ-2 Total Score: 0    Additional concerns today:  No              Today's PHQ-2 Score:   PHQ-2 ( 1999 Pfizer) 11/9/2021   Q1: Little interest or pleasure in doing things 0   Q2: Feeling down, depressed or hopeless 0   PHQ-2 Score 0   Q1: Little interest or pleasure in doing things Not at all   Q2: Feeling down, depressed or hopeless Not at all   PHQ-2 Score 0       Abuse: Current or Past (Physical, Sexual or Emotional) - No  Do you feel safe in your environment? No        Social History     Tobacco Use     Smoking status: Never Smoker     Smokeless tobacco: Never Used   Substance Use Topics     Alcohol use: No     Alcohol/week: 0.0 standard drinks         Alcohol Use 11/9/2021   Prescreen: >3 drinks/day or >7 drinks/week? No   Prescreen: >3 drinks/day or >7 drinks/week? -       Reviewed orders with patient.  Reviewed health maintenance and updated orders accordingly - Yes      Breast Cancer Screening:  Any new diagnosis of family breast, ovarian, or bowel cancer? No    FHS-7:   Breast CA Risk Assessment (FHS-7) 10/5/2021 10/5/2021   Did any of your first-degree relatives have breast or ovarian cancer? No No   Did any of your relatives have bilateral breast cancer? No -   Did any man in your family have breast cancer? No -   Did any woman in your family have breast and ovarian cancer? No No   Did any  woman in your family have breast cancer before age 50 y? No -   Do you have 2 or more relatives with breast and/or ovarian cancer? No No   Do you have 2 or more relatives with breast and/or bowel cancer? No No         Pertinent mammograms are reviewed under the imaging tab.    History of abnormal Pap smear: NO - age 30-65 PAP every 5 years with negative HPV co-testing recommended  PAP / HPV Latest Ref Rng & Units 3/23/2018 10/8/2015 9/1/2015   PAP (Historical) - NIL NIL UNSAT   HPV16 NEG:Negative Negative - -   HPV18 NEG:Negative Negative - -   HRHPV NEG:Negative Negative - -     Reviewed and updated as needed this visit by clinical staff   Allergies              Reviewed and updated as needed this visit by Provider               Past Medical History:   Diagnosis Date     Anxiety 1/29/2020     CARDIOVASCULAR SCREENING; LDL GOAL LESS THAN 160 10/31/2010     Cervicalgia 9/14/2018     Chronic allergic rhinitis due to other allergic trigger, unspecified seasonality 9/14/2018     Constipation, unspecified constipation type 9/14/2018     Family history of diabetes mellitus 10/28/2013     GERD (gastroesophageal reflux disease) 3/17/2011     History of neck pain 1/19/2019     Hx of abdominal pain 1/19/2019     Insomnia, unspecified type 10/12/2018     Mild single current episode of major depressive disorder (H) 9/14/2018     Non-seasonal allergic rhinitis 9/14/2018     Radial styloid tenosynovitis of left hand 11/9/2021     Skin macule 10/12/2018     Vaginal atrophy 10/8/2015       Past Surgical History:   Procedure Laterality Date     COLONOSCOPY N/A 3/15/2019    Procedure: COLONOSCOPY & EGD;  Surgeon: Orlin Chaudhry MD;  Location:  GI     ESOPHAGOSCOPY, GASTROSCOPY, DUODENOSCOPY (EGD), COMBINED N/A 3/15/2019    Procedure: ESOPHAGOSCOPY, GASTROSCOPY, DUODENOSCOPY (EGD) ([facility]);  Surgeon: Orlin Chaudhry MD;  Location:  GI     HYSTERECTOMY, VAGINAL       LAPAROSCOPIC APPENDECTOMY         Family History   Problem  "Relation Age of Onset     Diabetes Mother      Diabetes Brother      Colorectal Cancer Father      Diabetes Brother      Diabetes Brother      Colon Cancer No family hx of        Social History     Tobacco Use     Smoking status: Never Smoker     Smokeless tobacco: Never Used   Substance Use Topics     Alcohol use: No     Alcohol/week: 0.0 standard drinks         Review of Systems   Constitutional: Negative for chills and fever.   HENT: Negative for congestion, ear pain, hearing loss and sore throat.    Eyes: Negative for pain and visual disturbance.   Respiratory: Negative for cough and shortness of breath.    Cardiovascular: Negative for chest pain, palpitations and peripheral edema.   Gastrointestinal: Positive for diarrhea and heartburn. Negative for abdominal pain, constipation, hematochezia and nausea.   Breasts:  Negative for tenderness, breast mass and discharge.   Genitourinary: Positive for frequency and hematuria. Negative for dysuria, genital sores, pelvic pain, urgency, vaginal bleeding and vaginal discharge.   Musculoskeletal: Positive for joint swelling and myalgias. Negative for arthralgias.   Skin: Negative for rash.   Neurological: Positive for weakness. Negative for dizziness, headaches and paresthesias.   Psychiatric/Behavioral: Positive for mood changes. The patient is nervous/anxious.        Hematuria previously evaluated   OBJECTIVE:   /71 (BP Location: Right arm, Patient Position: Chair, Cuff Size: Adult Regular)   Pulse 58   Temp 98.5  F (36.9  C) (Oral)   Ht 1.6 m (5' 3\")   Wt 58.1 kg (128 lb)   LMP  (LMP Unknown)   SpO2 98%   BMI 22.67 kg/m    Physical Exam  Constitutional:       General: She is not in acute distress.     Appearance: She is well-developed.   HENT:      Right Ear: Tympanic membrane and external ear normal.      Left Ear: Tympanic membrane and external ear normal.      Nose: Nose normal.      Mouth/Throat:      Pharynx: No oropharyngeal exudate.   Eyes:      " General:         Right eye: No discharge.         Left eye: No discharge.      Conjunctiva/sclera: Conjunctivae normal.      Pupils: Pupils are equal, round, and reactive to light.   Neck:      Thyroid: No thyromegaly.      Trachea: No tracheal deviation.   Cardiovascular:      Rate and Rhythm: Normal rate and regular rhythm.      Pulses: Normal pulses.      Heart sounds: Normal heart sounds, S1 normal and S2 normal. No murmur heard.  No friction rub. No S3 or S4 sounds.    Pulmonary:      Effort: Pulmonary effort is normal. No respiratory distress.      Breath sounds: Normal breath sounds. No wheezing or rales.   Abdominal:      General: Bowel sounds are normal.      Palpations: Abdomen is soft. There is no mass.      Tenderness: There is no abdominal tenderness.   Musculoskeletal:      Cervical back: Neck supple.      Comments: As described   Lymphadenopathy:      Cervical: No cervical adenopathy.   Skin:     General: Skin is warm and dry.      Findings: No rash.   Neurological:      Mental Status: She is alert and oriented to person, place, and time.      Motor: No abnormal muscle tone.      Deep Tendon Reflexes: Reflexes are normal and symmetric.   Psychiatric:         Thought Content: Thought content normal.         Judgment: Judgment normal.       No current synovitis        ASSESSMENT/PLAN:     Problem List Items Addressed This Visit     Cervicalgia (Chronic)     Pain in neck occurs when rotating head episodically, feels dizzy.  4 to 6 weeks.  No therapies tried.  Exam without radicular findings, range of motion noncontributory.  Pain to palpation of trapezius bilaterally.  Voltaren gel, refer for PT         Relevant Orders    [facility] Ref[facility]    COVID-19 vaccine administered     Offered         GERD (gastroesophageal reflux disease)     Completely controlled with H2 blocker.  Returns without.  Discussed lifelong use.  Database adequate         Relevant Medications    famotidine (PEPCID) 40 MG tablet  "   Other Relevant Orders    CBC with platelets and differential (Completed)    Need for prophylactic vaccination and inoculation against influenza     Offered         Relevant Orders    INFLUENZA VACCINE IM > 6 MONTHS VALENT IIV4 (AFLURIA/FLUZONE) (Completed)    Radial styloid tenosynovitis of left hand     Pain 6 weeks left wrist right as well to a lesser degree.  Positive Finkelstein's left.  Discussed.  Refer         Relevant Orders    Orthopedic  Referral    Seasonal allergies     She would like refills of her effective nasal therapies         Relevant Medications    fluticasone (FLONASE) 50 MCG/ACT nasal spray    ipratropium (ATROVENT) 0.06 % nasal spray      Other Visit Diagnoses     Routine general medical examination at a health care facility    -  Primary    Relevant Orders    Comprehensive metabolic panel (BMP + Alb, Alk Phos, ALT, AST, Total. Bili, TP)          Patient has been advised of split billing requirements and indicates understanding: Yes  COUNSELING:  Reviewed preventive health counseling, as reflected in patient instructions    Estimated body mass index is 22.67 kg/m  as calculated from the following:    Height as of this encounter: 1.6 m (5' 3\").    Weight as of this encounter: 58.1 kg (128 lb).        She reports that she has never smoked. She has never used smokeless tobacco.      Counseling Resources:  ATP IV Guidelines  Pooled Cohorts Equation Calculator  Breast Cancer Risk Calculator  BRCA-Related Cancer Risk Assessment: FHS-7 Tool  FRAX Risk Assessment  ICSI Preventive Guidelines  Dietary Guidelines for Americans, 2010  USDA's MyPlate  ASA Prophylaxis  Lung CA Screening    David Bustos MD  [facility]  Answers for HPI/ROS submitted by the patient on 11/9/2021  If you checked off any problems, how difficult have these problems made it for you to do your work, take care of things at home, or get along with other people?: Not difficult at all  PHQ9 " TOTAL SCORE: 6  VIKTORIA 7 TOTAL SCORE: 3

## 2021-11-09 NOTE — PATIENT INSTRUCTIONS

## 2021-11-09 NOTE — PROGRESS NOTES
"   SUBJECTIVE:   CC: Mary Carbajal is an 61 year old woman who presents for preventive health visit.     {  Patient has been advised of split billing requirements and indicates understanding: Yes  HPI    {Outside tests to abstract? :251710}    {additional problems to add (Optional):649878}    Today's PHQ-2 Score:   PHQ-2 ( 1999 Pfizer) 2/4/2021   Q1: Little interest or pleasure in doing things 0   Q2: Feeling down, depressed or hopeless 0   PHQ-2 Score 0   Q1: Little interest or pleasure in doing things -   Q2: Feeling down, depressed or hopeless -   PHQ-2 Score -       Abuse: Current or Past (Physical, Sexual or Emotional) - { :008113}  Do you feel safe in your environment? { :816088}        Social History     Tobacco Use     Smoking status: Never Smoker     Smokeless tobacco: Never Used   Substance Use Topics     Alcohol use: No     Alcohol/week: 0.0 standard drinks     {Rooming Staff- Complete this question if Prescreen response is not shown below for today's visit. If you drink alcohol do you typically have >3 drinks per day or >7 drinks per week? (Optional):149571}    Alcohol Use 8/1/2019   Prescreen: >3 drinks/day or >7 drinks/week? No   Prescreen: >3 drinks/day or >7 drinks/week? -   {add AUDIT responses (Optional) (A score of 7 for adult men is an indication of hazardous drinking; a score of 8 or more is an indication of an alcohol use disorder.  A score of 7 or more for adult women is an indication of hazardous drinking or an alchohol use disorder):204159}    Reviewed orders with patient.  Reviewed health maintenance and updated orders accordingly - { :223138::\"Yes\"}  {Chronicprobdata (optional):523754}    Breast Cancer Screening:  Any new diagnosis of family breast, ovarian, or bowel cancer? {Yes_Link to Screening / No:372796}    FHS-7:   Breast CA Risk Assessment (FHS-7) 10/5/2021 10/5/2021   Did any of your first-degree relatives have breast or ovarian cancer? No No   Did any of your relatives have " "bilateral breast cancer? No -   Did any man in your family have breast cancer? No -   Did any woman in your family have breast and ovarian cancer? No No   Did any woman in your family have breast cancer before age 50 y? No -   Do you have 2 or more relatives with breast and/or ovarian cancer? No No   Do you have 2 or more relatives with breast and/or bowel cancer? No No     {If any of the questions to the BCRA (FHS-7) are answered yes, consider ordering referral for genetic counseling (Optional) :300609::\"click delete button to remove this line now\"}  {AMB Mammogram Decision Support (Optional) :161671}  Pertinent mammograms are reviewed under the imaging tab.    History of abnormal Pap smear: { :758774}  PAP / HPV Latest Ref Rng & Units 3/23/2018 10/8/2015 9/1/2015   PAP (Historical) - NIL NIL UNSAT   HPV16 NEG:Negative Negative - -   HPV18 NEG:Negative Negative - -   HRHPV NEG:Negative Negative - -     Reviewed and updated as needed this visit by clinical staff                Reviewed and updated as needed this visit by Provider               {HISTORY OPTIONS (Optional):108821}    Review of Systems  {FEMALE ROS (Optional):699390}     OBJECTIVE:   LMP  (LMP Unknown)   Physical Exam  {Exam Choices (Optional):931931}    {Diagnostic Test Results (Optional):940800::\"Diagnostic Test Results:\",\"Labs reviewed in Epic\"}    ASSESSMENT/PLAN:   {Diag Picklist:554431}    Patient has been advised of split billing requirements and indicates understanding: {YES / NO:916794::\"Yes\"}  COUNSELING:  {FEMALE COUNSELING MESSAGES:554914::\"Reviewed preventive health counseling, as reflected in patient instructions\"}    Estimated body mass index is 22.5 kg/m  as calculated from the following:    Height as of 1/18/21: 1.6 m (5' 3\").    Weight as of 8/18/21: 57.6 kg (127 lb).    {Weight Management Plan (ACO) Complete if BMI is abnormal-  Ages 18-64  BMI >24.9.  Age 65+ with BMI <23 or >30 (Optional):586419}    She reports that she has never " smoked. She has never used smokeless tobacco.      Counseling Resources:  ATP IV Guidelines  Pooled Cohorts Equation Calculator  Breast Cancer Risk Calculator  BRCA-Related Cancer Risk Assessment: FHS-7 Tool  FRAX Risk Assessment  ICSI Preventive Guidelines  Dietary Guidelines for Americans, 2010  USDA's MyPlate  ASA Prophylaxis  Lung CA Screening    David Bustos MD  [facility]

## 2021-11-10 LAB
ALBUMIN SERPL-MCNC: 3.8 G/DL (ref 3.4–5)
ALP SERPL-CCNC: 70 U/L (ref 40–150)
ALT SERPL W P-5'-P-CCNC: 26 U/L (ref 0–50)
ANION GAP SERPL CALCULATED.3IONS-SCNC: 6 MMOL/L (ref 3–14)
AST SERPL W P-5'-P-CCNC: 19 U/L (ref 0–45)
BILIRUB SERPL-MCNC: 0.2 MG/DL (ref 0.2–1.3)
BUN SERPL-MCNC: 16 MG/DL (ref 7–30)
CALCIUM SERPL-MCNC: 9.4 MG/DL (ref 8.5–10.1)
CHLORIDE BLD-SCNC: 105 MMOL/L (ref 94–109)
CO2 SERPL-SCNC: 25 MMOL/L (ref 20–32)
CREAT SERPL-MCNC: 0.89 MG/DL (ref 0.52–1.04)
GFR SERPL CREATININE-BSD FRML MDRD: 71 ML/MIN/1.73M2
GLUCOSE BLD-MCNC: 99 MG/DL (ref 70–99)
POTASSIUM BLD-SCNC: 4.2 MMOL/L (ref 3.4–5.3)
PROT SERPL-MCNC: 7.8 G/DL (ref 6.8–8.8)
SODIUM SERPL-SCNC: 136 MMOL/L (ref 133–144)

## 2021-11-10 ASSESSMENT — ANXIETY QUESTIONNAIRES: GAD7 TOTAL SCORE: 3

## 2021-11-10 ASSESSMENT — PATIENT HEALTH QUESTIONNAIRE - PHQ9: SUM OF ALL RESPONSES TO PHQ QUESTIONS 1-9: 6

## 2021-11-10 NOTE — ASSESSMENT & PLAN NOTE
Pain 6 weeks left wrist right as well to a lesser degree.  Positive Finkelstein's left.  Discussed.  Refer

## 2021-11-10 NOTE — ASSESSMENT & PLAN NOTE
Pain in neck occurs when rotating head episodically, feels dizzy.  4 to 6 weeks.  No therapies tried.  Exam without radicular findings, range of motion noncontributory.  Pain to palpation of trapezius bilaterally.  Voltaren gel, refer for PT

## 2021-11-10 NOTE — ASSESSMENT & PLAN NOTE
Completely controlled with H2 blocker.  Returns without.  Discussed lifelong use.  Database adequate

## 2021-11-15 NOTE — PROGRESS NOTES
ASSESSMENT & PLAN  Patient Instructions     1. Bilateral wrist pain    2. Radial styloid tenosynovitis of left hand    3. Osteoarthritis of carpometacarpal (CMC) joint of both thumbs    4. Radial styloid tenosynovitis of right hand      -Patient has bilateral wrist pain due to tendinitis and arthritis at the base of the thumbs  -Patient will start formal hand therapy and home exercise program  -Patient has a history of GERD and so, will start Celebrex 200mg daily rather than regular NSAIDs  -Patient will purchase BILATERAL THUMB SPICA SPLINTS at her pharmacy which she can wear while at work.  -Patient will follow up if pain does not improve  -Call direct clinic number [[phone]] at any time with questions or concerns.    Albert Yeo MD [facility]  [facility]          -----    SUBJECTIVE  Mary Carbajal is a/an 61 year old Right handed female who is seen in consultation at the request of  David Bustos M.D. for evaluation of bilateral wrist pain. The patient is seen by themselves.  Patient states her pain started in January 2021, she took some ibuprofen and IcyHot which improved her pain, but for the past 4 months pain has worsened in her wrists, she states she cannot even open a water bottle without pain.     Onset: 10 month(s) ago. Reports insidious onset without acute precipitating event.  Location of Pain: bilateral wrists, radial aspect  Rating of Pain at worst: 8/10  Rating of Pain Currently: 8/10  Worsened by: grasping things, trying to open things, any movement in wrists she gets stabbing pain  Better with: ibuprofen, IcyHot  Treatments tried: ibuprofen, IcyHot   Associated symptoms: numbness in fingers with heavy lifting  Orthopedic history: NO  Relevant surgical history: NO  Social history: social history: works as cleaning services     Past Medical History:   Diagnosis Date     Anxiety 1/29/2020     CARDIOVASCULAR SCREENING; LDL GOAL LESS THAN 160  10/31/2010     Cervicalgia 9/14/2018     Chronic allergic rhinitis due to other allergic trigger, unspecified seasonality 9/14/2018     Constipation, unspecified constipation type 9/14/2018     Family history of diabetes mellitus 10/28/2013     GERD (gastroesophageal reflux disease) 3/17/2011     History of neck pain 1/19/2019     Hx of abdominal pain 1/19/2019     Insomnia, unspecified type 10/12/2018     Mild single current episode of major depressive disorder (H) 9/14/2018     Non-seasonal allergic rhinitis 9/14/2018     Radial styloid tenosynovitis of left hand 11/9/2021     Skin macule 10/12/2018     Vaginal atrophy 10/8/2015     Social History     Socioeconomic History     Marital status:      Spouse name: Sebastian     Number of children: 4     Years of education: Not on file     Highest education level: Not on file   Occupational History     Occupation: housekeeping   Tobacco Use     Smoking status: Never Smoker     Smokeless tobacco: Never Used   Substance and Sexual Activity     Alcohol use: No     Alcohol/week: 0.0 standard drinks     Drug use: No     Sexual activity: Yes     Partners: Male   Other Topics Concern     Parent/sibling w/ CABG, MI or angioplasty before 65F 55M? No   Social History Narrative     Not on file     Social Determinants of Health     Financial Resource Strain: Low Risk      Difficulty of Paying Living Expenses: Not hard at all   Food Insecurity: No Food Insecurity     Worried About Running Out of Food in the Last Year: Never true     Ran Out of Food in the Last Year: Never true   Transportation Needs: No Transportation Needs     Lack of Transportation (Medical): No     Lack of Transportation (Non-Medical): No   Physical Activity: Sufficiently Active     Days of Exercise per Week: 4 days     Minutes of Exercise per Session: 120 min   Stress: Stress Concern Present     Feeling of Stress : To some extent   Social Connections: Moderately Integrated     Frequency of Communication with  "Friends and Family: More than three times a week     Frequency of Social Gatherings with Friends and Family: Twice a week     Attends Congregational Services: More than 4 times per year     Active Member of Clubs or Organizations: No     Attends Club or Organization Meetings: Not on file     Marital Status:    Intimate Partner Violence: Not on file   Housing Stability: Unknown     Unable to Pay for Housing in the Last Year: No     Number of Places Lived in the Last Year: Not on file     Unstable Housing in the Last Year: No         Patient's past medical, surgical, social, and family histories were reviewed today and no changes are noted.    REVIEW OF SYSTEMS:  10 point ROS is negative other than symptoms noted above in HPI, Past Medical History or as stated below  Constitutional: NEGATIVE for fever, chills, change in weight  Skin: NEGATIVE for worrisome rashes, moles or lesions  GI/: NEGATIVE for bowel or bladder changes  Neuro: NEGATIVE for weakness, dizziness or paresthesias    OBJECTIVE:  /76   Ht 1.6 m (5' 3\")   Wt 58.8 kg (129 lb 9.6 oz)   LMP  (LMP Unknown)   BMI 22.96 kg/m     General: healthy, alert and in no distress  HEENT: no scleral icterus or conjunctival erythema  Skin: no suspicious lesions or rash. No jaundice.  CV: regular rhythm by palpation  Resp: normal respiratory effort without conversational dyspnea   Psych: normal mood and affect  Gait: normal steady gait with appropriate coordination and balance  Neuro: Normal sensory exam of bilateral hands. Normal 2 pt discrimination.   MSK:  BILATERAL WRIST  Inspection:    No swelling or obvious deformity or asymmetry  Palpation:    Tender about the distal radius, 1st dorsal compartment and 1st CMC. Remainder of bony and ligamentous line marks are nontender.     Metacarpals: normal    Thumb: CMC    Fingers: MCP joint, PIP joint, DIP joint  Range of Motion:    Full (active and passive) flexion, extension, pronation/supination, and " ulnar/radial deviation.  Strength:    No deficits in flexion, extension, ulnar/radial deviation, or  strength.. Normal pinch strength.  Special Tests:    Positive: Finkelstein's, CMC grind       Independent visualization of the below image:  Recent Results (from the past 24 hour(s))   XR Wrist Bilateral G/E 3 Views    Narrative    Mild first CMC degenerative changes bilaterally.  No acute fracture or   dislocation.         Albert Yeo MD [facility]

## 2021-11-19 ENCOUNTER — OFFICE VISIT (OUTPATIENT)
Dept: ORTHOPEDICS | Facility: CLINIC | Age: 61
End: 2021-11-19
Attending: FAMILY MEDICINE
Payer: COMMERCIAL

## 2021-11-19 ENCOUNTER — ANCILLARY PROCEDURE (OUTPATIENT)
Dept: GENERAL RADIOLOGY | Facility: CLINIC | Age: 61
End: 2021-11-19
Attending: FAMILY MEDICINE
Payer: COMMERCIAL

## 2021-11-19 VITALS
HEIGHT: 63 IN | WEIGHT: 129.6 LBS | BODY MASS INDEX: 22.96 KG/M2 | DIASTOLIC BLOOD PRESSURE: 76 MMHG | SYSTOLIC BLOOD PRESSURE: 108 MMHG

## 2021-11-19 DIAGNOSIS — M25.532 BILATERAL WRIST PAIN: Primary | ICD-10-CM

## 2021-11-19 DIAGNOSIS — M25.531 BILATERAL WRIST PAIN: Primary | ICD-10-CM

## 2021-11-19 DIAGNOSIS — M25.532 BILATERAL WRIST PAIN: ICD-10-CM

## 2021-11-19 DIAGNOSIS — M65.4 RADIAL STYLOID TENOSYNOVITIS OF RIGHT HAND: ICD-10-CM

## 2021-11-19 DIAGNOSIS — M18.0 OSTEOARTHRITIS OF CARPOMETACARPAL (CMC) JOINT OF BOTH THUMBS: ICD-10-CM

## 2021-11-19 DIAGNOSIS — M65.4 RADIAL STYLOID TENOSYNOVITIS OF LEFT HAND: ICD-10-CM

## 2021-11-19 DIAGNOSIS — M25.531 BILATERAL WRIST PAIN: ICD-10-CM

## 2021-11-19 PROCEDURE — 73110 X-RAY EXAM OF WRIST: CPT | Mod: LT | Performed by: FAMILY MEDICINE

## 2021-11-19 PROCEDURE — 99204 OFFICE O/P NEW MOD 45 MIN: CPT | Performed by: FAMILY MEDICINE

## 2021-11-19 RX ORDER — IBUPROFEN 200 MG
200 TABLET ORAL EVERY 4 HOURS PRN
COMMUNITY
End: 2022-11-16

## 2021-11-19 RX ORDER — CELECOXIB 200 MG/1
200 CAPSULE ORAL DAILY PRN
Qty: 30 CAPSULE | Refills: 2 | Status: SHIPPED | OUTPATIENT
Start: 2021-11-19 | End: 2022-11-16

## 2021-11-19 ASSESSMENT — MIFFLIN-ST. JEOR: SCORE: 1131.99

## 2021-11-19 NOTE — LETTER
11/19/2021         RE: Mary Carbajal  [address] [facility] [id number]        Dear Colleague,    Thank you for referring your patient, Mary Carbajal, to the [facility] [place]. Please see a copy of my visit note below.    ASSESSMENT & PLAN  Patient Instructions     1. Bilateral wrist pain    2. Radial styloid tenosynovitis of left hand    3. Osteoarthritis of carpometacarpal (CMC) joint of both thumbs    4. Radial styloid tenosynovitis of right hand      -Patient has bilateral wrist pain due to tendinitis and arthritis at the base of the thumbs  -Patient will start formal hand therapy and home exercise program  -Patient has a history of GERD and so, will start Celebrex 200mg daily rather than regular NSAIDs  -Patient will purchase BILATERAL THUMB SPICA SPLINTS at her pharmacy which she can wear while at work.  -Patient will follow up if pain does not improve  -Call direct clinic number [[phone]] at any time with questions or concerns.    Albert Yeo MD [facility]  [facility]          -----    SUBJECTIVE  Mary Carbajal is a/an 61 year old Right handed female who is seen in consultation at the request of  David Bustos M.D. for evaluation of bilateral wrist pain. The patient is seen by themselves.  Patient states her pain started in January 2021, she took some ibuprofen and IcyHot which improved her pain, but for the past 4 months pain has worsened in her wrists, she states she cannot even open a water bottle without pain.     Onset: 10 month(s) ago. Reports insidious onset without acute precipitating event.  Location of Pain: bilateral wrists, radial aspect  Rating of Pain at worst: 8/10  Rating of Pain Currently: 8/10  Worsened by: grasping things, trying to open things, any movement in wrists she gets stabbing pain  Better with: ibuprofen, IcyHot  Treatments tried: ibuprofen, IcyHot   Associated symptoms: numbness  in fingers with heavy lifting  Orthopedic history: NO  Relevant surgical history: NO  Social history: social history: works as cleaning services     Past Medical History:   Diagnosis Date     Anxiety 1/29/2020     CARDIOVASCULAR SCREENING; LDL GOAL LESS THAN 160 10/31/2010     Cervicalgia 9/14/2018     Chronic allergic rhinitis due to other allergic trigger, unspecified seasonality 9/14/2018     Constipation, unspecified constipation type 9/14/2018     Family history of diabetes mellitus 10/28/2013     GERD (gastroesophageal reflux disease) 3/17/2011     History of neck pain 1/19/2019     Hx of abdominal pain 1/19/2019     Insomnia, unspecified type 10/12/2018     Mild single current episode of major depressive disorder (H) 9/14/2018     Non-seasonal allergic rhinitis 9/14/2018     Radial styloid tenosynovitis of left hand 11/9/2021     Skin macule 10/12/2018     Vaginal atrophy 10/8/2015     Social History     Socioeconomic History     Marital status:      Spouse name: Sebastian     Number of children: 4     Years of education: Not on file     Highest education level: Not on file   Occupational History     Occupation: housekeeping   Tobacco Use     Smoking status: Never Smoker     Smokeless tobacco: Never Used   Substance and Sexual Activity     Alcohol use: No     Alcohol/week: 0.0 standard drinks     Drug use: No     Sexual activity: Yes     Partners: Male   Other Topics Concern     Parent/sibling w/ CABG, MI or angioplasty before 65F 55M? No   Social History Narrative     Not on file     Social Determinants of Health     Financial Resource Strain: Low Risk      Difficulty of Paying Living Expenses: Not hard at all   Food Insecurity: No Food Insecurity     Worried About Running Out of Food in the Last Year: Never true     Ran Out of Food in the Last Year: Never true   Transportation Needs: No Transportation Needs     Lack of Transportation (Medical): No     Lack of Transportation (Non-Medical): No   Physical  "Activity: Sufficiently Active     Days of Exercise per Week: 4 days     Minutes of Exercise per Session: 120 min   Stress: Stress Concern Present     Feeling of Stress : To some extent   Social Connections: Moderately Integrated     Frequency of Communication with Friends and Family: More than three times a week     Frequency of Social Gatherings with Friends and Family: Twice a week     Attends Oriental orthodox Services: More than 4 times per year     Active Member of Clubs or Organizations: No     Attends Club or Organization Meetings: Not on file     Marital Status:    Intimate Partner Violence: Not on file   Housing Stability: Unknown     Unable to Pay for Housing in the Last Year: No     Number of Places Lived in the Last Year: Not on file     Unstable Housing in the Last Year: No         Patient's past medical, surgical, social, and family histories were reviewed today and no changes are noted.    REVIEW OF SYSTEMS:  10 point ROS is negative other than symptoms noted above in HPI, Past Medical History or as stated below  Constitutional: NEGATIVE for fever, chills, change in weight  Skin: NEGATIVE for worrisome rashes, moles or lesions  GI/: NEGATIVE for bowel or bladder changes  Neuro: NEGATIVE for weakness, dizziness or paresthesias    OBJECTIVE:  /76   Ht 1.6 m (5' 3\")   Wt 58.8 kg (129 lb 9.6 oz)   LMP  (LMP Unknown)   BMI 22.96 kg/m     General: healthy, alert and in no distress  HEENT: no scleral icterus or conjunctival erythema  Skin: no suspicious lesions or rash. No jaundice.  CV: regular rhythm by palpation  Resp: normal respiratory effort without conversational dyspnea   Psych: normal mood and affect  Gait: normal steady gait with appropriate coordination and balance  Neuro: Normal sensory exam of bilateral hands. Normal 2 pt discrimination.   MSK:  BILATERAL WRIST  Inspection:    No swelling or obvious deformity or asymmetry  Palpation:    Tender about the distal radius, 1st dorsal " compartment and 1st CMC. Remainder of bony and ligamentous line marks are nontender.     Metacarpals: normal    Thumb: CMC    Fingers: MCP joint, PIP joint, DIP joint  Range of Motion:    Full (active and passive) flexion, extension, pronation/supination, and ulnar/radial deviation.  Strength:    No deficits in flexion, extension, ulnar/radial deviation, or  strength.. Normal pinch strength.  Special Tests:    Positive: Finkelstein's, CMC grind       Independent visualization of the below image:  Recent Results (from the past 24 hour(s))   XR Wrist Bilateral G/E 3 Views    Narrative    Mild first CMC degenerative changes bilaterally.  No acute fracture or   dislocation.         Albert Yeo MD [facility]        Again, thank you for allowing me to participate in the care of your patient.        Sincerely,        Albert Yeo, MD

## 2021-11-19 NOTE — PATIENT INSTRUCTIONS
1. Bilateral wrist pain    2. Radial styloid tenosynovitis of left hand    3. Osteoarthritis of carpometacarpal (CMC) joint of both thumbs    4. Radial styloid tenosynovitis of right hand      -Patient has bilateral wrist pain due to tendinitis and arthritis at the base of the thumbs  -Patient will start formal hand therapy and home exercise program  -Patient has a history of GERD and so, will start Celebrex 200mg daily rather than regular NSAIDs  -Patient will purchase BILATERAL THUMB SPICA SPLINTS at her pharmacy which she can wear while at work.  -Patient will follow up if pain does not improve  -Call direct clinic number [[phone]] at any time with questions or concerns.    Albert Yeo MD [facility]  [facility] [place]

## 2021-11-22 ENCOUNTER — THERAPY VISIT (OUTPATIENT)
Dept: PHYSICAL THERAPY | Facility: CLINIC | Age: 61
End: 2021-11-22
Attending: FAMILY MEDICINE
Payer: COMMERCIAL

## 2021-11-22 DIAGNOSIS — M54.2 CERVICALGIA: Chronic | ICD-10-CM

## 2021-11-22 PROCEDURE — 97162 PT EVAL MOD COMPLEX 30 MIN: CPT | Mod: GP | Performed by: PHYSICAL THERAPIST

## 2021-11-22 PROCEDURE — 97112 NEUROMUSCULAR REEDUCATION: CPT | Mod: GP | Performed by: PHYSICAL THERAPIST

## 2021-11-22 PROCEDURE — 97110 THERAPEUTIC EXERCISES: CPT | Mod: GP | Performed by: PHYSICAL THERAPIST

## 2021-11-22 NOTE — PROGRESS NOTES
[facility] Initial Evaluation -- Cervical    Evaluation Date: November 22, 2021  Mary Carbajal is a 61 year old female with a cervical condition.   Referral: Dr. Bustos  Work mechanical stresses: house keeping   Employment status: 6 hrs per day; floor work (shining floors)  Leisure mechanical stresses: gym 3 x week, cardio, sauna  Functional disability score (NDI):  See flow sheet;   VAS score (0-10): 8/10  Patient goals/expectations:  To get pain and dizziness relief    HISTORY:    Present symptoms:  Upper cervical bilaterally.  Pain quality (sharp/shooting/stabbing/aching/burning/cramping):   Aching, dizziness, headache (frontal).  Paresthesia (yes/no):  Yes hands if wearing something tight around wrists    Present since (onset date): October 2021     Symptoms (improving/unchanging/worsening):  unchanging    Symptoms commenced as a result of: no apparent reason   Condition occurred in the following environment:  home    Symptoms at onset (neck/arm/forearm/headache): neck, onset of dizziness about a month later  Constant symptoms (neck/arm/forearm/headache): none  Intermittent symptoms (neck/arm/forearm/headache): dizziness, neck, headache    Symptoms are made worse with the following: Sometimes dizziness with Bending, Sometimes Turning, Always Rising, time of day - No effect and Sometimes On the move   Symptoms are made better with the following: when still, nothing tried otherwise    Disturbed sleep (yes/no): sometimes  Number of pillows: 1  Sleeping postures (prone/sup/side R/L): back, sides    Previous episodes (0/1-5/6-10/11+): 1 Year of first episode: 2018    Previous history: episodic neck pain  Previous treatments: self management    Specific Questions: (as reported by the patient)  Dizziness/Tinnitus/Nausea/Swallowing (pos/neg): dizziness  Gait/Upper Limbs (normal/abnormal): bilateral wrist pain  Medications (nil/NSAIDS/anlag/steroids/anticoag/other):  NSAIDS  Medical allergies:  See  chart  General health (excellent/good/fair/poor):  good  Pertinent medical history:  Concussions/Dizziness and Menopausal  Imaging (None/Xray/MRI/Other):  none  Recent or major surgery (yes/no): no  Night pain (yes/no): no  Accidents (yes/no): no  Unexplained weight loss (yes/no): none  Barriers at home: none  Other red flags: none    EXAMINATION    Posture:   Sitting (good/fair/poor): fair to good  Standing (good/fair/poor):      Protruded head (yes/no): no    Wry Neck (right/left/nil):  nil  Relevant (yes/no):  no     Correction of posture(better/worse/no effect): no effect  Other observations:  none    Neurological:    Motor Deficit:  n/a   Reflexes:  n/a  Sensory Deficit:  intact   Dural signs:  Not tested    Movement Loss:   Sven Mod Min Nil Pain   Protrusion    x pain   Flexion    x stretch   Retraction   x x erp   Extension   x x pdm   Lateral flexion R   x x stretch   Lateral flexion L   x x stretch   Rotation R   x x    Rotation L   x x erp     Test Movements:   During: produces, abolishes, increases, decreases, no effect, centralizing, peripheralizing  After: better, worse, no better, no worse, no effect, centralized, peripheralized    Pretest symptoms sitting: painfree   Symptoms During Symptoms After ROM increased ROM decreased No Effect   PRO        Rep PRO        RET Produces    No Worse         Rep RET Produces    No Worse    x     RET EXT        Rep RET EXT          Pretest symptoms lying:     Symptoms During Symptoms After ROM increased ROM decreased No Effect   RET        Rep RET        RET EXT        Rep RET EXT          If required, pretest symptoms sitting:      Symptoms During Symptoms After ROM increased ROM decreased No Effect   LF-R        Rep LF-R        LF-L        Rep LF-L        ROT-R        Rep ROT-R        ROT-L        Rep ROT-L        FLEX        Rep FLEX            Static Tests:   Protrusion:    Flexion:    Retraction:    Extension (sitting/prone/supine):      Other Tests:      Provisional Classification:  Derangement - Bilateral, symmetrical, symptoms above elbow    Principle of Management:  Education:  Posture, sleeping posture, traffic light, therapeutic dose of exercise    Equipment provided:    Mechanical therapy (Y/N):  Y   Extension principle:  Rep RET + pt. Op x 10/2 hrs   Lateral principle:    Flexion principle:     Other:      ASSESSMENT/PLAN:    Patient is a 61 year old female with cervical complaints.    Patient has the following significant findings with corresponding treatment plan.                Diagnosis 1:  cervicalgia  Pain -  manual therapy, self management, education, directional preference exercise and home program  Decreased ROM/flexibility - manual therapy and therapeutic exercise  Decreased function - therapeutic activities  Impaired posture - neuro re-education    Therapy Evaluation Codes:   1) History comprised of:   Personal factors that impact the plan of care:      Anxiety and Language.    Comorbidity factors that impact the plan of care are:      Dizziness, Menopausal and Numbness/tingling.     Medications impacting care: Anti-inflammatory.  2) Examination of Body Systems comprised of:   Body structures and functions that impact the plan of care:      Cervical spine.   Activity limitations that impact the plan of care are:      Bending, Driving, Working and Sleeping.  3) Clinical presentation characteristics are:   Evolving/Changing.  4) Decision-Making    Moderate complexity using standardized patient assessment instrument and/or measureable assessment of functional outcome.  Cumulative Therapy Evaluation is: Moderate complexity.    Previous and current functional limitations:  (See Goal Flow Sheet for this information)    Short term and Long term goals: (See Goal Flow Sheet for this information)     Communication ability:  Patient appears to be able to clearly communicate and understand verbal and written communication and follow directions  correctly.  Patient has an  for communication clarity.  Treatment Explanation - The following has been discussed with the patient:   RX ordered/plan of care  Anticipated outcomes  Possible risks and side effects  This patient would benefit from PT intervention to resume normal activities.   Rehab potential is good.    Frequency:  1 X week, once daily  Duration:  for 6 weeks  Discharge Plan:  Achieve all LTG.  Independent in home treatment program.  Reach maximal therapeutic benefit.    Please refer to the daily flowsheet for treatment today, total treatment time and time spent performing 1:1 timed codes.

## 2021-12-01 ENCOUNTER — APPOINTMENT (OUTPATIENT)
Dept: INTERPRETER SERVICES | Facility: CLINIC | Age: 61
End: 2021-12-01
Payer: COMMERCIAL

## 2021-12-14 ENCOUNTER — THERAPY VISIT (OUTPATIENT)
Dept: OCCUPATIONAL THERAPY | Facility: CLINIC | Age: 61
End: 2021-12-14
Attending: FAMILY MEDICINE
Payer: COMMERCIAL

## 2021-12-14 DIAGNOSIS — M65.4 RADIAL STYLOID TENOSYNOVITIS OF RIGHT HAND: ICD-10-CM

## 2021-12-14 DIAGNOSIS — M18.0 OSTEOARTHRITIS OF CARPOMETACARPAL (CMC) JOINT OF BOTH THUMBS: ICD-10-CM

## 2021-12-14 DIAGNOSIS — M65.4 RADIAL STYLOID TENOSYNOVITIS OF LEFT HAND: ICD-10-CM

## 2021-12-14 DIAGNOSIS — M79.646 THUMB PAIN, UNSPECIFIED LATERALITY: ICD-10-CM

## 2021-12-14 PROCEDURE — 97110 THERAPEUTIC EXERCISES: CPT | Mod: GO | Performed by: OCCUPATIONAL THERAPIST

## 2021-12-14 PROCEDURE — 97140 MANUAL THERAPY 1/> REGIONS: CPT | Mod: GO | Performed by: OCCUPATIONAL THERAPIST

## 2021-12-14 PROCEDURE — 97112 NEUROMUSCULAR REEDUCATION: CPT | Mod: GO | Performed by: OCCUPATIONAL THERAPIST

## 2021-12-14 PROCEDURE — 97165 OT EVAL LOW COMPLEX 30 MIN: CPT | Mod: GO | Performed by: OCCUPATIONAL THERAPIST

## 2021-12-14 NOTE — PROGRESS NOTES
Hand Therapy Initial Evaluation  Current Date:  12/14/2021    Subjective:  Mary Carbajal is a 61 year old right hand dominant female.    Diagnosis: B CMC OA and DeQuervain's  DOI:  2 mos ago (MD order date 11/19/21)    Patient reports symptoms of pain, stiffness/loss of motion, weakness/loss of strength, edema, numbness and tingling of the B hands/wrists which occurred due to heavy lifting. Since onset symptoms are unchanged. Special tests:  x-rays.  Previous treatment: medication, OTC braces. General health as reported by patient is fair.  Pertinent medical history includes: Concussions/Dizziness.  Medical allergies: none.  Surgical history: none.  Medication history: Anti-inflammatory.    Occupational Profile Information:  Current occupation is FT Housekeeping  Currently working in normal job without restrictions (pt reports she is the only one who does her job)  Job Tasks: Lifting, Carrying, Prolonged Sitting, Repetitive Tasks  Prior functional level:  no limitations  Barriers include:none  Mobility: No difficulty  Transportation: drives  Leisure activities/hobbies: cleaning    Upper Extremity Functional Index Score:  SCORE:   Column Totals: /80: 68   (A lower score indicates greater disability.)    Objective:  Pain Level (Scale 0-10):   12/14/2021   At Rest 4/10   With Use 8/10     Pain Description:  Date 12/14/2021   Location B volar/radial wrists   Pain Quality Burning   Frequency constant     Pain is worst  daytime   Exacerbated by  Lifting heavy items   Relieved by Massage, brace, medication   Progression unchanged     Sensation   Decreased Median Nerve distribution per pt report, Decreased Radial Nerve distribution per pt report, Intermittently and with a lot of lifting    Edema   Measured in cm   12/14/2021   DWC R: 14.6  L: 14.6      Palpation  Pain Report:  0-10/10   12/14/2021   Radial Styloid R: 4/10  L: 3/10   1st DC R: 0/10  L: 0/10     FCR R: 6/10  L: 5/10    Thumb CMC R: 7/10    L: 6/10   PIN  Site R: 6/10  L: 7/10     ROM  Pain Report: 0-10/10  Thumb 12/14/2021 12/14/2021   AROM (PROM) R L   MP WNL WNL   IP WNL WNL   RABD WNL WNL   PABD WNL WNL     Wrist 12/14/2021 12/14/2021   AROM (PROM) R L   Extension 63 59   Flexion 50 56   RD 15 20   UD 32, 4/10 35, 4/10   UD with Th Flex 15, 5/10 21, 5/10     Special Tests   Pain Report:  0-10/10   12/14/2021   Cassiasteins R: +, 3/10  L: +, 3/10   Radial Nerve Tinel's (DRSN) R: +  L: +     Neural Tension Testing  RNT: Radial Neurodynamic Test (based on JAY Starkey's ULNT)   12/14/2021   0-5 Scale R: 2   L: 2   Position:   0/5: Arm across abdomen in coronal plane  1/5: Depress shoulder, ER to neutral ABD shoulder to 45 degrees  2/5: IR shoulder to end range, keep elbow at 90 degrees  3/5: Extend elbow to 0 degrees  4/5: Fully pronate forearm  5/5: Flex wrist and fingers with UD  Notes:    (+) indicates beyond grade level but less than FDC to next level    (-) indicates over FDC to level    S1  onset/change of patient's symptoms    S2 definite stop point based on patient's discomfort level    Resisted Testing  MMT scale: 0-5/5, Pain Report: 0-10/10   12/14/2021   APL R: 5/5, 3/10  L: 5/5, 3/10   EPB R: 5/5, 3/10  L:5/5, 3/10   EPL R: 5/5, 3/10  L: 5/5, 3/10     Strength   (Measured in pounds)  Pain Report: 0-10/10   12/14/21 12/14/2021   Trials R L   1  2  3 17 14   Average 17 14     Lat Pinch 12/14/2021 12/14/2021   Trials R L   1  2  3 5 4   Average 5 4     3 Pt Pinch 12/14/2021 12/14/2021   Trials R L   1  2  3 4 3   Average 4 3      Assessment:  Patient presents with symptoms consistent with diagnosis of bilateral wrist and thumb pain, with conservative intervention.     Patient's limitations or Problem List includes:  Pain, Decreased ROM/motion, Weakness, Decreased  and Decreased pinch of the bilateral wrist and thumb which interferes with the patient's ability to perform Work Tasks, Recreational Activities and Household Chores as compared to  previous level of function.    Rehab Potential:  Excellent - Return to full activity, no limitations    Patient will benefit from skilled Occupational Therapy to increase ROM, flexibility, overall strength,  strength and pinch strength and decrease pain to return to previous activity level and resume normal daily tasks and to reach their rehab potential.    Barriers to Learning:  Language    Communication Issues:  Patient has an  for communication clarity.    Chart Review: Chart Review, Brief history including review of medical and/or therapy records relating to the presenting problem and Simple history review with patient    Identified Performance Deficits: home establishment and management, work and leisure activities    Assessment of Occupational Performance:  3-5 Performance Deficits    Clinical Decision Making (Complexity): Low complexity    Treatment Explanation:  The following has been discussed with the patient:  RX ordered/plan of care  Anticipated outcomes  Possible risks and side effects    Plan:  Frequency:  1 X week, once daily  Duration:  for 6 weeks    Treatment Plan:    Modalities:    US   Therapeutic Exercise:    AROM, AAROM, PROM, Tendon Gliding, Blocking, Isotonics and Isometrics  Neuromuscular re-ed:   Nerve Gliding, Coordination/Dexterity, Proprioceptive Training and Kinesiotaping  Manual Techniques:   Coordination/Dexterity, Joint mobilization and Myofascial release  Orthotic Fabrication:    Static and Forearm based  Self Care:    Ergonomic Considerations and Work Tasks    Discharge Plan:  Achieve all LTG.  Independent in home treatment program.  Reach maximal therapeutic benefit.    Discharge Plan:    Achieve all LTG.  Independent in home treatment program.  Reach maximal therapeutic benefit.    Home Exercise Program:  Wrist AROM  DeQuervain's stretch  Massage to 1st dorsal compartment  Radial Nerve Glide    Next Visit:  MFR  Joint mobs  Passive radial nerve glides

## 2021-12-15 ENCOUNTER — THERAPY VISIT (OUTPATIENT)
Dept: PHYSICAL THERAPY | Facility: CLINIC | Age: 61
End: 2021-12-15
Payer: COMMERCIAL

## 2021-12-15 DIAGNOSIS — M54.2 CERVICALGIA: ICD-10-CM

## 2021-12-15 PROCEDURE — 97110 THERAPEUTIC EXERCISES: CPT | Mod: GP | Performed by: PHYSICAL THERAPIST

## 2021-12-15 PROCEDURE — 97112 NEUROMUSCULAR REEDUCATION: CPT | Mod: GP | Performed by: PHYSICAL THERAPIST

## 2021-12-15 NOTE — PROGRESS NOTES
DISCHARGE REPORT    Progress reporting period is from 11-22-21 to 12-15-21.       SUBJECTIVE  Subjective changes noted by patient:  .  Subjective: I feel better--I have been doing the exercises every day, 3 x day; reports 80% better; the headache is gone; the dizziness has not come back either.     Current pain level is 0/10 Current Pain level: 0/10.     Previous pain level was  8/10 Initial Pain level: 8/10.   Changes in function:  Yes (See Goal flowsheet attached for changes in current functional level)  Adverse reaction to treatment or activity: None    OBJECTIVE  Changes noted in objective findings:  The objective findings below are from DOS 12-15-21.  Objective: Cx ROM: RET=min to nil loss, EXT=min loss, Thx ROM EXT=min to mod loss, +ULNTT bilat; NDI=0     ASSESSMENT/PLAN  Updated problem list and treatment plan: Diagnosis 1:  Neck pain/HA  Pain -  self management, education, directional preference exercise and home program  Decreased ROM/flexibility - manual therapy and therapeutic exercise  Decreased joint mobility - manual therapy and therapeutic exercise  Decreased function - therapeutic activities  Impaired posture - neuro re-education  STG/LTGs have been met or progress has been made towards goals:  Yes (See Goal flow sheet completed today.)  Assessment of Progress: The patient has met all of their long term goals.  Self Management Plans:  Patient is independent in a home treatment program.  Patient is independent in self management of symptoms.  I have re-evaluated this patient and find that the nature, scope, duration and intensity of the therapy is appropriate for the medical condition of the patient.  Mary continues to require the following intervention to meet STG and LTG's:  PT intervention is no longer required to meet STG/LTG.    Recommendations:  This patient is ready to be discharged from therapy and continue their home treatment program.    Please refer to the daily flowsheet for treatment  today, total treatment time and time spent performing 1:1 timed codes.

## 2021-12-16 PROBLEM — M65.4 RADIAL STYLOID TENOSYNOVITIS OF RIGHT HAND: Status: ACTIVE | Noted: 2021-12-16

## 2021-12-16 PROBLEM — M79.646 THUMB PAIN: Status: ACTIVE | Noted: 2021-12-16

## 2022-01-07 ENCOUNTER — THERAPY VISIT (OUTPATIENT)
Dept: OCCUPATIONAL THERAPY | Facility: CLINIC | Age: 62
End: 2022-01-07
Payer: COMMERCIAL

## 2022-01-07 DIAGNOSIS — M18.0 OSTEOARTHRITIS OF CARPOMETACARPAL (CMC) JOINT OF BOTH THUMBS: ICD-10-CM

## 2022-01-07 DIAGNOSIS — M65.4 RADIAL STYLOID TENOSYNOVITIS OF LEFT HAND: ICD-10-CM

## 2022-01-07 DIAGNOSIS — M65.4 RADIAL STYLOID TENOSYNOVITIS OF RIGHT HAND: ICD-10-CM

## 2022-01-07 DIAGNOSIS — M79.646 THUMB PAIN, UNSPECIFIED LATERALITY: ICD-10-CM

## 2022-01-07 PROCEDURE — 97140 MANUAL THERAPY 1/> REGIONS: CPT | Mod: GO | Performed by: OCCUPATIONAL THERAPIST

## 2022-01-07 PROCEDURE — 97112 NEUROMUSCULAR REEDUCATION: CPT | Mod: GO | Performed by: OCCUPATIONAL THERAPIST

## 2022-02-10 DIAGNOSIS — M18.0 OSTEOARTHRITIS OF CARPOMETACARPAL (CMC) JOINT OF BOTH THUMBS: ICD-10-CM

## 2022-02-10 DIAGNOSIS — M65.4 RADIAL STYLOID TENOSYNOVITIS OF LEFT HAND: ICD-10-CM

## 2022-02-10 DIAGNOSIS — M25.531 BILATERAL WRIST PAIN: ICD-10-CM

## 2022-02-10 DIAGNOSIS — M25.532 BILATERAL WRIST PAIN: ICD-10-CM

## 2022-02-11 RX ORDER — CELECOXIB 200 MG/1
200 CAPSULE ORAL DAILY PRN
Qty: 30 CAPSULE | Refills: 2 | OUTPATIENT
Start: 2022-02-11

## 2022-02-11 NOTE — TELEPHONE ENCOUNTER
Patient needs to come in for re-evaluation.  To consider cortisone injections.  Patient cannot take long term oral anti-inflammatories.

## 2022-02-15 NOTE — TELEPHONE ENCOUNTER
No consent to communicate on file.     Phone call to patient with assist of . Patient informed that we are unable to refill. She states her hands are better from the medication and she does not need a refill.     ABIMBOLA Garcia RN

## 2022-03-07 PROBLEM — M65.4 RADIAL STYLOID TENOSYNOVITIS OF LEFT HAND: Status: RESOLVED | Noted: 2021-11-09 | Resolved: 2022-03-07

## 2022-03-07 PROBLEM — M79.646 THUMB PAIN: Status: RESOLVED | Noted: 2021-12-16 | Resolved: 2022-03-07

## 2022-03-07 PROBLEM — M65.4 RADIAL STYLOID TENOSYNOVITIS OF RIGHT HAND: Status: RESOLVED | Noted: 2021-12-16 | Resolved: 2022-03-07

## 2022-03-07 PROBLEM — M18.0 OSTEOARTHRITIS OF CARPOMETACARPAL (CMC) JOINT OF BOTH THUMBS: Status: RESOLVED | Noted: 2021-12-16 | Resolved: 2022-03-07

## 2022-06-08 ENCOUNTER — OFFICE VISIT (OUTPATIENT)
Dept: FAMILY MEDICINE | Facility: CLINIC | Age: 62
End: 2022-06-08
Payer: COMMERCIAL

## 2022-06-08 VITALS
WEIGHT: 128.56 LBS | DIASTOLIC BLOOD PRESSURE: 79 MMHG | RESPIRATION RATE: 16 BRPM | TEMPERATURE: 98.4 F | OXYGEN SATURATION: 98 % | HEART RATE: 71 BPM | SYSTOLIC BLOOD PRESSURE: 133 MMHG | BODY MASS INDEX: 22.77 KG/M2

## 2022-06-08 DIAGNOSIS — Z23 ENCOUNTER FOR IMMUNIZATION: ICD-10-CM

## 2022-06-08 DIAGNOSIS — Z00.00 ENCOUNTER FOR PREVENTIVE HEALTH EXAMINATION: ICD-10-CM

## 2022-06-08 DIAGNOSIS — R00.2 PALPITATIONS: Primary | ICD-10-CM

## 2022-06-08 DIAGNOSIS — F32.0 MILD SINGLE CURRENT EPISODE OF MAJOR DEPRESSIVE DISORDER (H): ICD-10-CM

## 2022-06-08 LAB
BASOPHILS # BLD AUTO: 0 10E3/UL (ref 0–0.2)
BASOPHILS NFR BLD AUTO: 0 %
EOSINOPHIL # BLD AUTO: 0.2 10E3/UL (ref 0–0.7)
EOSINOPHIL NFR BLD AUTO: 2 %
ERYTHROCYTE [DISTWIDTH] IN BLOOD BY AUTOMATED COUNT: 13.6 % (ref 10–15)
HCT VFR BLD AUTO: 37.4 % (ref 35–47)
HGB BLD-MCNC: 12.8 G/DL (ref 11.7–15.7)
LYMPHOCYTES # BLD AUTO: 3 10E3/UL (ref 0.8–5.3)
LYMPHOCYTES NFR BLD AUTO: 45 %
MCH RBC QN AUTO: 27.4 PG (ref 26.5–33)
MCHC RBC AUTO-ENTMCNC: 34.2 G/DL (ref 31.5–36.5)
MCV RBC AUTO: 80 FL (ref 78–100)
MONOCYTES # BLD AUTO: 0.5 10E3/UL (ref 0–1.3)
MONOCYTES NFR BLD AUTO: 7 %
NEUTROPHILS # BLD AUTO: 3.1 10E3/UL (ref 1.6–8.3)
NEUTROPHILS NFR BLD AUTO: 46 %
PLATELET # BLD AUTO: 219 10E3/UL (ref 150–450)
RBC # BLD AUTO: 4.67 10E6/UL (ref 3.8–5.2)
WBC # BLD AUTO: 6.8 10E3/UL (ref 4–11)

## 2022-06-08 PROCEDURE — 85025 COMPLETE CBC W/AUTO DIFF WBC: CPT | Performed by: FAMILY MEDICINE

## 2022-06-08 PROCEDURE — 82746 ASSAY OF FOLIC ACID SERUM: CPT | Performed by: FAMILY MEDICINE

## 2022-06-08 PROCEDURE — 84443 ASSAY THYROID STIM HORMONE: CPT | Performed by: FAMILY MEDICINE

## 2022-06-08 PROCEDURE — 82607 VITAMIN B-12: CPT | Performed by: FAMILY MEDICINE

## 2022-06-08 PROCEDURE — 99214 OFFICE O/P EST MOD 30 MIN: CPT | Mod: 25 | Performed by: FAMILY MEDICINE

## 2022-06-08 PROCEDURE — 36415 COLL VENOUS BLD VENIPUNCTURE: CPT | Performed by: FAMILY MEDICINE

## 2022-06-08 PROCEDURE — 91305 COVID-19,PF,PFIZER (12+ YRS): CPT | Performed by: FAMILY MEDICINE

## 2022-06-08 PROCEDURE — 82306 VITAMIN D 25 HYDROXY: CPT | Performed by: FAMILY MEDICINE

## 2022-06-08 PROCEDURE — 0054A COVID-19,PF,PFIZER (12+ YRS): CPT | Performed by: FAMILY MEDICINE

## 2022-06-08 NOTE — LETTER
Grisel 10, 2022      Mary DONAL [place]  [address] [facility] [address]        Dear ,    We are writing to inform you of your test results.    Tests are OK.       Resulted Orders   TSH with free T4 reflex   Result Value Ref Range    TSH 1.40 0.40 - 4.00 mU/L   Vitamin B12   Result Value Ref Range    Vitamin B12 2,979 (H) 193 - 986 pg/mL   Vitamin D Deficiency   Result Value Ref Range    Vitamin D, Total (25-Hydroxy) 85 (H) 20 - 75 ug/L    Narrative    Season, race, dietary intake, and treatment affect the concentration of 25-hydroxy-Vitamin D. Values may decrease during winter months and increase during summer months. Values 20-29 ug/L may indicate Vitamin D insufficiency and values <20 ug/L may indicate Vitamin D deficiency.    Vitamin D determination is routinely performed by an immunoassay specific for 25 hydroxyvitamin D3.  If an individual is on vitamin D2(ergocalciferol) supplementation, please specify 25 OH vitamin D2 and D3 level determination by LCMSMS test VITD23.     Folate   Result Value Ref Range    Folic Acid 28.6 >=5.4 ng/mL      Comment:      Deficient: <3.4 ng/mL  Indeterminate: 3.4-5.4 ng/mL  Normal: > 5.4 ng/mL   CBC with platelets and differential   Result Value Ref Range    WBC Count 6.8 4.0 - 11.0 10e3/uL    RBC Count 4.67 3.80 - 5.20 10e6/uL    Hemoglobin 12.8 11.7 - 15.7 g/dL    Hematocrit 37.4 35.0 - 47.0 %    MCV 80 78 - 100 fL    MCH 27.4 26.5 - 33.0 pg    MCHC 34.2 31.5 - 36.5 g/dL    RDW 13.6 10.0 - 15.0 %    Platelet Count 219 150 - 450 10e3/uL    % Neutrophils 46 %    % Lymphocytes 45 %    % Monocytes 7 %    % Eosinophils 2 %    % Basophils 0 %    Absolute Neutrophils 3.1 1.6 - 8.3 10e3/uL    Absolute Lymphocytes 3.0 0.8 - 5.3 10e3/uL    Absolute Monocytes 0.5 0.0 - 1.3 10e3/uL    Absolute Eosinophils 0.2 0.0 - 0.7 10e3/uL    Absolute Basophils 0.0 0.0 - 0.2 10e3/uL       If you have any questions or concerns, please call the clinic at the number listed above.        Sincerely,      David Bustos MD

## 2022-06-08 NOTE — PROGRESS NOTES
"  Assessment & Plan   Problem List Items Addressed This Visit     Encounter for immunization     Offered           Relevant Orders    COVID-19,PF,PFIZER (12+ Yrs GRAY LABEL) (Completed)    Encounter for preventive health examination     She is interested in assessing for vitamin deficiency.  We shall do so           Relevant Orders    Vitamin B12 (Completed)    Vitamin D Deficiency (Completed)    Folate (Completed)    CBC with platelets and differential (Completed)    Mild single current episode of major depressive disorder (H)     She thinks this is doing well no changes           Palpitations - Primary     Episodic, painful's, self-limiting.  Frequency unclear.  She thinks its been happening frequently in the last 2 weeks.  Regular rhythm today.  Broaden database           Relevant Orders    TSH with free T4 reflex (Completed)    Echocardiogram Complete    Adult Leadless EKG Monitor 8 to 14 Days                        Return in about 3 weeks (around 6/29/2022), or [facility]4 appt lab go.    Dvaid Bustos MD  [facility]    Paulo Hernandez is a 62 year old who presents for the following health issues     HPI     Concern - blood pressure, palpitations   Onset: 05/30/22, 6/3/22  Description: pt states on these days she was very lethargic, couldn't keep her eyes open and very shakey  Intensity: moderate  Progression of Symptoms:  Has happened since those episodes   Accompanying Signs & Symptoms: shakiness, weakness  Previous history of similar problem: none  Precipitating factors:        Worsened by: none  Alleviating factors:        Improved by: some sips of coca cola   Therapies tried and outcome: coca cola, pt states that it \"perked her up\"     Review of Systems   No chest pain edema no systemic symptoms        Objective    /79 (BP Location: Right arm, Patient Position: Chair, Cuff Size: Adult Regular)   Pulse 71   Temp 98.4  F (36.9  C) (Oral)   Resp 16   Wt 58.3 kg (128 lb " 9 oz)   LMP  (LMP Unknown)   SpO2 98%   BMI 22.77 kg/m    Body mass index is 22.77 kg/m .  Physical Exam     Pulse regular x3 minutes   no clicks rubs or murmurs   no thyromegaly      David Bustos MD

## 2022-06-09 PROBLEM — R00.2 PALPITATIONS: Status: ACTIVE | Noted: 2022-06-09

## 2022-06-09 PROBLEM — Z00.00 ENCOUNTER FOR PREVENTIVE HEALTH EXAMINATION: Status: ACTIVE | Noted: 2022-06-09

## 2022-06-09 PROBLEM — Z23 ENCOUNTER FOR IMMUNIZATION: Status: ACTIVE | Noted: 2022-06-09

## 2022-06-09 LAB
DEPRECATED CALCIDIOL+CALCIFEROL SERPL-MC: 85 UG/L (ref 20–75)
FOLATE SERPL-MCNC: 28.6 NG/ML
TSH SERPL DL<=0.005 MIU/L-ACNC: 1.4 MU/L (ref 0.4–4)
VIT B12 SERPL-MCNC: 2979 PG/ML (ref 193–986)

## 2022-06-10 NOTE — ASSESSMENT & PLAN NOTE
Episodic, painful's, self-limiting.  Frequency unclear.  She thinks its been happening frequently in the last 2 weeks.  Regular rhythm today.  Broaden database

## 2022-08-24 ENCOUNTER — OFFICE VISIT (OUTPATIENT)
Dept: URGENT CARE | Facility: URGENT CARE | Age: 62
End: 2022-08-24
Payer: COMMERCIAL

## 2022-08-24 VITALS
WEIGHT: 128 LBS | DIASTOLIC BLOOD PRESSURE: 72 MMHG | TEMPERATURE: 98.2 F | HEIGHT: 63 IN | OXYGEN SATURATION: 98 % | SYSTOLIC BLOOD PRESSURE: 118 MMHG | BODY MASS INDEX: 22.68 KG/M2 | HEART RATE: 64 BPM

## 2022-08-24 DIAGNOSIS — R30.0 DYSURIA: ICD-10-CM

## 2022-08-24 DIAGNOSIS — M53.3 SACROILIAC JOINT PAIN: Primary | ICD-10-CM

## 2022-08-24 LAB
ALBUMIN UR-MCNC: NEGATIVE MG/DL
APPEARANCE UR: CLEAR
BILIRUB UR QL STRIP: NEGATIVE
COLOR UR AUTO: YELLOW
GLUCOSE UR STRIP-MCNC: NEGATIVE MG/DL
HGB UR QL STRIP: NEGATIVE
KETONES UR STRIP-MCNC: NEGATIVE MG/DL
LEUKOCYTE ESTERASE UR QL STRIP: NEGATIVE
NITRATE UR QL: NEGATIVE
PH UR STRIP: 5.5 [PH] (ref 5–7)
SP GR UR STRIP: <=1.005 (ref 1–1.03)
UROBILINOGEN UR STRIP-ACNC: 0.2 E.U./DL

## 2022-08-24 PROCEDURE — 81003 URINALYSIS AUTO W/O SCOPE: CPT

## 2022-08-24 PROCEDURE — 99214 OFFICE O/P EST MOD 30 MIN: CPT | Performed by: FAMILY MEDICINE

## 2022-08-24 RX ORDER — DICLOFENAC SODIUM 75 MG/1
75 TABLET, DELAYED RELEASE ORAL 2 TIMES DAILY
Qty: 14 TABLET | Refills: 0 | Status: SHIPPED | OUTPATIENT
Start: 2022-08-24 | End: 2022-09-07

## 2022-08-24 NOTE — PROGRESS NOTES
"  ICD-10-CM    1. Sacroiliac joint pain  M53.3 diclofenac (VOLTAREN) 75 MG EC tablet   2. Dysuria  R30.0 UA macro with reflex to Microscopic and Culture - Clinc Collect     No evidence of UTI.  Exam findings c/w SI joint as source of low back/hip discomfort.    PLAN:  Patient Instructions   Take diclofenac 75 mg twice a day for the next week to help with SI joint pain.  Gentle stretches of the area can be helpful.    If not better in one week, contact your regular doctor to discuss possible referral for physical therapy to help with SI joint pain.    SUBJECTIVE:  Mary Carbajal is a 62 year old female who presents to  with right-sided low back and hip pain since last night.  No known injuries.  No radiating pain down leg.  No weakness, numbness, or tingling.  Pain is localized to R low back/buttock and is worse with movement.  She has noticed that her urine feels more hot than usual and wants to rule out UTI as cause for pain.  No blood in urine.    OBJECTIVE:  /72 (BP Location: Right arm, Patient Position: Chair, Cuff Size: Adult Regular)   Pulse 64   Temp 98.2  F (36.8  C) (Oral)   Ht 1.6 m (5' 3\")   Wt 58.1 kg (128 lb)   LMP  (LMP Unknown)   SpO2 98%   Breastfeeding No   BMI 22.67 kg/m    GEN: well-appearing, in NAD  BACK: no bony midline tenderness, tenderness over the R SI joint, pain worse with extension especially when standing on one leg.  Normal reflexes at knees and ankles bilaterally.  Normal strength in lower extremities.    Results for orders placed or performed in visit on 08/24/22   UA macro with reflex to Microscopic and Culture - Clinc Collect     Status: Normal    Specimen: Urine, Clean Catch   Result Value Ref Range    Color Urine Yellow Colorless, Straw, Light Yellow, Yellow    Appearance Urine Clear Clear    Glucose Urine Negative Negative mg/dL    Bilirubin Urine Negative Negative    Ketones Urine Negative Negative mg/dL    Specific Gravity Urine <=1.005 1.003 - 1.035    Blood " Urine Negative Negative    pH Urine 5.5 5.0 - 7.0    Protein Albumin Urine Negative Negative mg/dL    Urobilinogen Urine 0.2 0.2, 1.0 E.U./dL    Nitrite Urine Negative Negative    Leukocyte Esterase Urine Negative Negative    Narrative    Microscopic not indicated

## 2022-08-24 NOTE — PATIENT INSTRUCTIONS
Take diclofenac 75 mg twice a day for the next week to help with SI joint pain.  Gentle stretches of the area can be helpful.    If not better in one week, contact your regular doctor to discuss possible referral for physical therapy to help with SI joint pain.

## 2022-09-06 DIAGNOSIS — M53.3 SACROILIAC JOINT PAIN: ICD-10-CM

## 2022-09-06 NOTE — TELEPHONE ENCOUNTER
Reason for Call:  Other appointment, call back and prescription    Detailed comments: Sebastian() called because Mary was seen in urgent care on 8/25/22. They took xrays and found her hip was out of place, they gave her medication for pain. She is out of medication and needs to do a follow up with Dr. Bustos. She is scheduled for his first opening which is 9/20/22. They were wondering if there is anyway for her to get in sooner and/or possibly get her more pain medication. Please call and discuss    Phone Number Patient can be reached at: Call Sebastian first at [phone] or if he's not available call Mary at [phone] you will need an  if you call Jaylyn    Best Time: ASAP    Can we leave a detailed message on this number? YES    Call taken on 9/6/2022 at 2:12 PM by Morena Callahan

## 2022-09-06 NOTE — TELEPHONE ENCOUNTER
Called Sebastian harden to confirm Rx given at .     Pt called back and confirmed Pt was given Diclofenac 75 mg twice a day

## 2022-09-06 NOTE — TELEPHONE ENCOUNTER
Contacted Sebastian, rescheduled Pt for 9/12/22 with Dhaval Lindsey.    Routed to provider to see if Pain Meds can be refilled until visit.    Tricia [facility]/EMT-B  [facility] / [employer]

## 2022-09-07 RX ORDER — DICLOFENAC SODIUM 75 MG/1
75 TABLET, DELAYED RELEASE ORAL 2 TIMES DAILY
Qty: 14 TABLET | Refills: 0 | Status: SHIPPED | OUTPATIENT
Start: 2022-09-07 | End: 2022-11-22

## 2022-09-12 ENCOUNTER — OFFICE VISIT (OUTPATIENT)
Dept: FAMILY MEDICINE | Facility: CLINIC | Age: 62
End: 2022-09-12
Payer: COMMERCIAL

## 2022-09-12 VITALS
RESPIRATION RATE: 12 BRPM | BODY MASS INDEX: 23.58 KG/M2 | TEMPERATURE: 98.1 F | HEART RATE: 70 BPM | WEIGHT: 133.1 LBS | SYSTOLIC BLOOD PRESSURE: 102 MMHG | OXYGEN SATURATION: 98 % | DIASTOLIC BLOOD PRESSURE: 70 MMHG

## 2022-09-12 DIAGNOSIS — M53.3 SACROILIAC JOINT PAIN: Primary | ICD-10-CM

## 2022-09-12 DIAGNOSIS — Z12.31 VISIT FOR SCREENING MAMMOGRAM: ICD-10-CM

## 2022-09-12 PROCEDURE — 99213 OFFICE O/P EST LOW 20 MIN: CPT | Performed by: PHYSICIAN ASSISTANT

## 2022-09-12 RX ORDER — NAPROXEN 500 MG/1
500 TABLET ORAL 2 TIMES DAILY WITH MEALS
Qty: 60 TABLET | Refills: 0 | Status: SHIPPED | OUTPATIENT
Start: 2022-09-12 | End: 2022-09-13

## 2022-09-12 NOTE — PROGRESS NOTES
Assessment & Plan     Sacroiliac joint pain    Switch to Naproxen. Start PT if covered by insurance.    - naproxen (NAPROSYN) 500 MG tablet; Take 1 tablet (500 mg) by mouth 2 times daily (with meals)  - Physical Therapy Referral; Future      Visit for screening mammogram    Mammogram ordered and scheduled.    - MA SCREENING DIGITAL BILAT - Future  (s+30); Future                 No follow-ups on file.   Follow-up Visit   Expected date:  Nov 12, 2022 (Approximate)      Follow Up Appointment Details:     Follow-up with whom?: PCP    Follow-Up for what?: Adult Preventive    How?: In Person                    Dhaval Lindsey PA-C  [facility] BRISA Hernandez is a 62 year old, presenting for the following health issues:  Follow Up (Urgent care)      HPI     ED/UC Followup:    Facility:  [facility] [place]  Date of visit: 08/24/2022  Reason for visit: Sacroiliac joint pain and Dysuria  Current Status: the pain is gone with the medicine but I don't want to be with medicine all the time. If I don't take the medicine I have pain      Review of Systems   Constitutional, HEENT, cardiovascular, pulmonary, gi and gu systems are negative, except as otherwise noted.        Objective    /70 (BP Location: Right arm, Patient Position: Sitting, Cuff Size: Adult Regular)   Pulse 70   Temp 98.1  F (36.7  C) (Oral)   Resp 12   Wt 60.4 kg (133 lb 1.6 oz)   LMP  (LMP Unknown)   SpO2 98%   BMI 23.58 kg/m    Body mass index is 23.58 kg/m .       Physical Exam   GENERAL: healthy, alert and no distress  EYES: Eyes grossly normal to inspection, PERRL and conjunctivae and sclerae normal  MS: no gross musculoskeletal defects noted, no edema  SKIN: no suspicious lesions or rashes  NEURO: Normal strength and tone, mentation intact and speech normal  Comprehensive back pain exam:  Tenderness of right SI joint, Range of motion not limited by pain, Lower extremity strength functional  and equal on both sides, Lower extremity sensation normal and equal on both sides and Straight leg raise negative bilaterally  PSYCH: mentation appears normal, affect normal/bright

## 2022-09-13 ENCOUNTER — TELEPHONE (OUTPATIENT)
Dept: FAMILY MEDICINE | Facility: CLINIC | Age: 62
End: 2022-09-13

## 2022-09-13 DIAGNOSIS — M53.3 SACROILIAC JOINT PAIN: ICD-10-CM

## 2022-09-13 RX ORDER — NAPROXEN 500 MG/1
500 TABLET ORAL 2 TIMES DAILY WITH MEALS
Qty: 60 TABLET | Refills: 0 | Status: SHIPPED | OUTPATIENT
Start: 2022-09-13 | End: 2022-11-16

## 2022-09-13 NOTE — TELEPHONE ENCOUNTER
Patient reports [facility] pharmacy did not receive prescription for Naprosyn.    Verified this with pharmacy tech at [facility].     Rx resent.    Klaudia Ochoa RN

## 2022-10-21 ENCOUNTER — ANCILLARY PROCEDURE (OUTPATIENT)
Dept: MAMMOGRAPHY | Facility: CLINIC | Age: 62
End: 2022-10-21
Payer: COMMERCIAL

## 2022-10-21 DIAGNOSIS — Z12.31 VISIT FOR SCREENING MAMMOGRAM: ICD-10-CM

## 2022-10-21 PROCEDURE — 77063 BREAST TOMOSYNTHESIS BI: CPT | Mod: TC | Performed by: RADIOLOGY

## 2022-10-21 PROCEDURE — 77067 SCR MAMMO BI INCL CAD: CPT | Mod: TC | Performed by: RADIOLOGY

## 2022-11-16 ENCOUNTER — PATIENT OUTREACH (OUTPATIENT)
Dept: FAMILY MEDICINE | Facility: CLINIC | Age: 62
End: 2022-11-16

## 2022-11-16 ENCOUNTER — OFFICE VISIT (OUTPATIENT)
Dept: FAMILY MEDICINE | Facility: CLINIC | Age: 62
End: 2022-11-16
Payer: COMMERCIAL

## 2022-11-16 VITALS
DIASTOLIC BLOOD PRESSURE: 67 MMHG | BODY MASS INDEX: 24.39 KG/M2 | HEIGHT: 61 IN | SYSTOLIC BLOOD PRESSURE: 113 MMHG | TEMPERATURE: 98.1 F | OXYGEN SATURATION: 99 % | WEIGHT: 129.2 LBS | HEART RATE: 63 BPM

## 2022-11-16 DIAGNOSIS — M53.3 SACROILIAC JOINT PAIN: ICD-10-CM

## 2022-11-16 DIAGNOSIS — J30.2 SEASONAL ALLERGIES: ICD-10-CM

## 2022-11-16 DIAGNOSIS — Z12.4 CERVICAL CANCER SCREENING: ICD-10-CM

## 2022-11-16 DIAGNOSIS — Z23 ENCOUNTER FOR IMMUNIZATION: ICD-10-CM

## 2022-11-16 DIAGNOSIS — Z00.00 ENCOUNTER FOR PREVENTIVE HEALTH EXAMINATION: Primary | ICD-10-CM

## 2022-11-16 PROCEDURE — 99213 OFFICE O/P EST LOW 20 MIN: CPT | Mod: 25 | Performed by: FAMILY MEDICINE

## 2022-11-16 PROCEDURE — G0145 SCR C/V CYTO,THINLAYER,RESCR: HCPCS | Performed by: FAMILY MEDICINE

## 2022-11-16 PROCEDURE — 0124A COVID-19,PF,PFIZER BOOSTER BIVALENT: CPT | Performed by: FAMILY MEDICINE

## 2022-11-16 PROCEDURE — 90682 RIV4 VACC RECOMBINANT DNA IM: CPT | Performed by: FAMILY MEDICINE

## 2022-11-16 PROCEDURE — 99396 PREV VISIT EST AGE 40-64: CPT | Mod: 25 | Performed by: FAMILY MEDICINE

## 2022-11-16 PROCEDURE — 90471 IMMUNIZATION ADMIN: CPT | Performed by: FAMILY MEDICINE

## 2022-11-16 PROCEDURE — 91312 COVID-19,PF,PFIZER BOOSTER BIVALENT: CPT | Performed by: FAMILY MEDICINE

## 2022-11-16 PROCEDURE — 87624 HPV HI-RISK TYP POOLED RSLT: CPT | Performed by: FAMILY MEDICINE

## 2022-11-16 RX ORDER — NAPROXEN 500 MG/1
500 TABLET ORAL 2 TIMES DAILY PRN
Qty: 60 TABLET | Refills: 0 | Status: SHIPPED | OUTPATIENT
Start: 2022-11-16 | End: 2022-11-22

## 2022-11-16 RX ORDER — IPRATROPIUM BROMIDE 42 UG/1
2 SPRAY, METERED NASAL 4 TIMES DAILY
Qty: 15 ML | Refills: 11 | Status: SHIPPED | OUTPATIENT
Start: 2022-11-16 | End: 2022-11-22

## 2022-11-16 RX ORDER — FLUTICASONE PROPIONATE 50 MCG
1 SPRAY, SUSPENSION (ML) NASAL DAILY
Qty: 16 G | Refills: 11 | Status: SHIPPED | OUTPATIENT
Start: 2022-11-16

## 2022-11-16 ASSESSMENT — ENCOUNTER SYMPTOMS
CHILLS: 0
DYSURIA: 0
ARTHRALGIAS: 0
WEAKNESS: 0
PARESTHESIAS: 0
NERVOUS/ANXIOUS: 1
EYE PAIN: 0
MYALGIAS: 0
COUGH: 0
HEADACHES: 0
FREQUENCY: 1
CONSTIPATION: 0
JOINT SWELLING: 0
SHORTNESS OF BREATH: 0
NAUSEA: 0
DIARRHEA: 0
BREAST MASS: 0
PALPITATIONS: 0
DIZZINESS: 0
HEMATURIA: 0
HEARTBURN: 0
FEVER: 0
SORE THROAT: 0
ABDOMINAL PAIN: 0
HEMATOCHEZIA: 0

## 2022-11-16 ASSESSMENT — PATIENT HEALTH QUESTIONNAIRE - PHQ9
10. IF YOU CHECKED OFF ANY PROBLEMS, HOW DIFFICULT HAVE THESE PROBLEMS MADE IT FOR YOU TO DO YOUR WORK, TAKE CARE OF THINGS AT HOME, OR GET ALONG WITH OTHER PEOPLE: NOT DIFFICULT AT ALL
SUM OF ALL RESPONSES TO PHQ QUESTIONS 1-9: 0
SUM OF ALL RESPONSES TO PHQ QUESTIONS 1-9: 0

## 2022-11-16 NOTE — PROGRESS NOTES
SUBJECTIVE:   CC: Mary is an 62 year old who presents for preventive health visit.   Patient has been advised of split billing requirements and indicates understanding: Yes     Healthy Habits:     Getting at least 3 servings of Calcium per day:  Yes    Bi-annual eye exam:  Yes    Dental care twice a year:  Yes    Sleep apnea or symptoms of sleep apnea:  None    Diet:  Regular (no restrictions)    Frequency of exercise:  4-5 days/week    Duration of exercise:  45-60 minutes    Taking medications regularly:  Yes    Medication side effects:  None    PHQ-2 Total Score: 0    Additional concerns today:  No                 Today's PHQ-2 Score:   PHQ-2 ( 1999 Pfizer) 11/16/2022   Q1: Little interest or pleasure in doing things 0   Q2: Feeling down, depressed or hopeless 0   PHQ-2 Score 0   PHQ-2 Total Score (12-17 Years)- Positive if 3 or more points; Administer PHQ-A if positive -   Q1: Little interest or pleasure in doing things Not at all   Q2: Feeling down, depressed or hopeless Not at all   PHQ-2 Score 0           Social History     Tobacco Use     Smoking status: Never     Smokeless tobacco: Never   Substance Use Topics     Alcohol use: No     Alcohol/week: 0.0 standard drinks     If you drink alcohol do you typically have >3 drinks per day or >7 drinks per week? No    Alcohol Use 11/16/2022   Prescreen: >3 drinks/day or >7 drinks/week? No   Prescreen: >3 drinks/day or >7 drinks/week? -       Reviewed orders with patient.  Reviewed health maintenance and updated orders accordingly - Yes      Breast Cancer Screening:    Breast CA Risk Assessment (FHS-7) 11/9/2021   Do you have a family history of breast, colon, or ovarian cancer? No / Unknown           Pertinent mammograms are reviewed under the imaging tab.    History of abnormal Pap smear:   PAP / HPV Latest Ref Rng & Units 3/23/2018 10/8/2015 9/1/2015   PAP (Historical) - NIL NIL UNSAT   HPV16 NEG:Negative Negative - -   HPV18 NEG:Negative Negative - -   HRHPV  "NEG:Negative Negative - -     Reviewed and updated as needed this visit by clinical staff   Tobacco  Allergies  Meds              Reviewed and updated as needed this visit by Provider                     Review of Systems   Constitutional: Negative for chills and fever.   HENT: Negative for congestion, ear pain, hearing loss and sore throat.    Eyes: Negative for pain and visual disturbance.   Respiratory: Negative for cough and shortness of breath.    Cardiovascular: Negative for chest pain, palpitations and peripheral edema.   Gastrointestinal: Negative for abdominal pain, constipation, diarrhea, heartburn, hematochezia and nausea.   Breasts:  Negative for tenderness, breast mass and discharge.   Genitourinary: Positive for frequency. Negative for dysuria, genital sores, hematuria, pelvic pain, urgency, vaginal bleeding and vaginal discharge.   Musculoskeletal: Negative for arthralgias, joint swelling and myalgias.   Skin: Negative for rash.   Neurological: Negative for dizziness, weakness, headaches and paresthesias.   Psychiatric/Behavioral: Negative for mood changes. The patient is nervous/anxious.      She desires no interventions for these complaints, considers them trivial     OBJECTIVE:   /67 (BP Location: Right arm, Patient Position: Sitting, Cuff Size: Adult Regular)   Pulse 63   Temp 98.1  F (36.7  C) (Oral)   Ht 1.549 m (5' 1\")   Wt 58.6 kg (129 lb 3.2 oz)   LMP  (LMP Unknown)   SpO2 99%   BMI 24.41 kg/m    Physical Exam  Exam conducted with a chaperone present.   Constitutional:       General: She is not in acute distress.     Appearance: Normal appearance. She is well-developed. She is not ill-appearing.   HENT:      Head: Normocephalic and atraumatic.      Right Ear: Tympanic membrane and external ear normal.      Left Ear: Tympanic membrane and external ear normal.      Nose: Nose normal.      Mouth/Throat:      Mouth: Mucous membranes are moist.      Pharynx: No oropharyngeal " exudate.   Eyes:      General:         Right eye: No discharge.         Left eye: No discharge.      Extraocular Movements: Extraocular movements intact.      Conjunctiva/sclera: Conjunctivae normal.   Neck:      Thyroid: No thyromegaly.      Trachea: No tracheal deviation.   Cardiovascular:      Rate and Rhythm: Normal rate and regular rhythm.      Pulses: Normal pulses.      Heart sounds: Normal heart sounds, S1 normal and S2 normal. No murmur heard.    No friction rub. No S3 or S4 sounds.   Pulmonary:      Effort: Pulmonary effort is normal. No respiratory distress.      Breath sounds: Normal breath sounds. No wheezing or rales.   Chest:   Breasts:     Right: No inverted nipple, mass, nipple discharge or skin change.      Left: No inverted nipple, mass, nipple discharge or skin change.   Abdominal:      General: Bowel sounds are normal.      Palpations: Abdomen is soft. There is no mass.      Tenderness: There is no abdominal tenderness.   Genitourinary:     General: Normal vulva.      Labia:         Right: No rash.         Left: No rash.       Vagina: Normal.      Cervix: Normal.      Comments: atrophy  Musculoskeletal:         General: Normal range of motion.      Cervical back: Neck supple.   Lymphadenopathy:      Cervical: No cervical adenopathy.      Upper Body:      Right upper body: No supraclavicular or axillary adenopathy.      Left upper body: No supraclavicular or axillary adenopathy.   Skin:     General: Skin is warm and dry.      Capillary Refill: Capillary refill takes less than 2 seconds.      Findings: No rash.   Neurological:      Mental Status: She is alert and oriented to person, place, and time.      Motor: No abnormal muscle tone.      Deep Tendon Reflexes: Reflexes are normal and symmetric.   Psychiatric:         Behavior: Behavior normal.         Thought Content: Thought content normal.         Judgment: Judgment normal.               ASSESSMENT/PLAN:     Problem List Items Addressed This  Visit     Cervical cancer screening     Today.         Relevant Orders    Pap Screen with HPV - recommended age 30 - 65 years    Encounter for immunization     Offered         Relevant Orders    COVID-19,PF,PFIZER BOOSTER BIVALENT (Completed)    Encounter for preventive health examination - Primary    Sacroiliac joint pain     Well-controlled with as needed NSAIDs.  Refill         Relevant Medications    naproxen (NAPROSYN) 500 MG tablet    Seasonal allergies     As needed Flonase is effective satisfactory         Relevant Medications    fluticasone (FLONASE) 50 MCG/ACT nasal spray    ipratropium (ATROVENT) 0.06 % nasal spray       Patient has been advised of split billing requirements and indicates understanding: Yes      COUNSELING:  Reviewed preventive health counseling, as reflected in patient instructions        She reports that she has never smoked. She has never used smokeless tobacco.      David Bustos MD  [facility]  Answers for HPI/ROS submitted by the patient on 11/16/2022  If you checked off any problems, how difficult have these problems made it for you to do your work, take care of things at home, or get along with other people?: Not difficult at all  PHQ9 TOTAL SCORE: 0

## 2022-11-16 NOTE — LETTER
Mary MANSFIELD [facility]  [address] [address]    Yong Hernandez,    Jade por elegir [facility] para oh necesidades de atención médica.     [facility] está transformando la atención primaria  En [facility], nos dedicamos a mejorar constantemente la forma en que atendemos las necesidades de atención médica de nuestros pacientes y comunidades. En antonietta momento, estamos modificando nuestra forma de prestar servicios.     Los cambios que notará son los siguientes:    un hincapié en fortalecer la relación con el proveedor de atención primaria;    acceso al servicio PAL (patient advocate and liaison) para guiarlo en oh necesidades de atención;    citas con jodi duración adaptada a oh necesidades específicas y un mayor acceso a un equipo de atención médica para ayudarlo a usted y a weldon proveedor a mejorar y mantener weldon mesha y bienestar;    un mejor acceso en línea a weldon equipo de atención médica.    Beneficios de un proveedor de atención primaria  Si no tiene un proveedor de atención primaria designado, le sugerimos que conozca a nuestro equipo de atención médica en línea y encuentre a un proveedor al que le gustaría consultar. La mayoría de nuestros proveedores tienen un video breve en weldon página de proveedores en línea. Visite [email or web address] para conocer a nuestros proveedores y oh ubicaciones.    Los beneficios de contar con un proveedor de atención primaria son los siguientes:      Lo conocen: saben weldon historial de mesha, oh antecedentes familiares y oh objetivos, lo cual facilita la elaboración conjunta de un plan de mesha.     Usted los conoce a ellos: facilita las conversaciones y las decisiones sobre weldon mesha.      Los médicos de atención primaria lo asisten cuando se enferma o padece algún dolor, jeanne además se esfuerzan por mantenerlo dakota mediante cuidados preventivos y pruebas de diagnóstico.      Es más probable que un médico que lo atiende regularmente note los cambios en weldon mesha.      Estará en contacto con un equipo de atención amplio que trabaja en conjunto con weldon proveedor para asistirlo.    Enlace de Defensa del Paciente (Patient Advocate Liaison, PAL)   Para asegurarnos de que reciba la atención adecuada en el momento oportuno, incluimos el servicio PAL o Enlace de Defensa del Paciente (Patient Advocate Liaison) gaurav parte de weldon equipo de atención médica. El PAL será weldon primera línea de contacto. Asistirán oh necesidades y lo ayudarán a navegar por nuestros servicios, lo pondrán en contacto con los miembros del equipo de atención médica y los recursos de la comunidad para garantizar que weldon atención esté basilio coordinada. Conocerá a los miembros del PAL en weldon próxima visita.     Acceso extendido al equipo de atención médica con jodi duración personalizada de las citas  Dependiendo de oh necesidades de atención médica, puede tener citas más largas o más cortas y consultar a otros proveedores de atención médica, incluidos los farmacéuticos de Gestión de la Terapia de Medicamentos (MTM, por oh siglas en inglés), los educadores de la diabetes, médicos de la mesha mental o asistentes sociales. En algunas ocasiones, pueden estar incluidos en weldon visita con el proveedor o puede visitarlos de forma individual.     Acceso en línea a oh registros de mesha y al equipo de atención médica  MyChart es nuestra herramienta en línea que facilita el acceso a la información de atención médica y la comunicación con los miembros del equipo de atención médica.     MyChart le permite hacer lo siguiente:     mine weldon plan de mantenimiento de la mesha para saber cuándo le toca un examen preventivo;    enviar mensajes de forma portillo a weldon equipo de atención médica;    mine weldon historial de mesha y los resúmenes de las visitas;     programar citas;     completar los cuestionarios y los registros electrónicos antes de las citas;      recibir asistencia del proveedor mediante jodi consulta en línea;      mine y pagar weldon  factura.     Regístrese en [email or web address]. Jodi vez que tenga jodi cuenta, también puede descargar la aplicación móvil.     Conexión a jodi atención más rápida y conveniente  Cuando necesite jodi atención rápida y conveniente, considere jodi de las siguientes opciones:       Consultas por video: es jodi opción de atención conveniente para visitar a weldon proveedor desde la comodidad de weldon hogar. La mayoría de las razones por las que usted acude a la clínica para tratar con weldon proveedor ahora se pueden hacer de forma virtual mediante un video. [place] incluye los seguimientos de medicamentos para patologías crónicas, las preguntas y las preocupaciones que pueda tener e incluso weldon visita anual de bienestar de Medicare.       Consulta telefónica: es otra opción conveniente para el seguimiento de los problemas comunes que pueden requerir un diálogo más profundo con weldon proveedor.       Consulta en línea: cuando necesite jodi atención rápida o tenga jodi pregunta breve sobre oh medicamentos, se realiza jodi consulta en línea mediante MyChart y weldon proveedor le responderá en el plazo de un día hábil.          Rubi VÁSQUEZ RN  Patient Advocate Uriel - IESHA VÁSQUEZ [facility]  [phone]

## 2022-11-16 NOTE — TELEPHONE ENCOUNTER
SB 3 PAL Welcome Letter Sent - translated to Pas[place]    Rubi VÁSQUEZ RN   Patient Advocate Uriel JULIAN RN  [facility]  [phone]

## 2022-11-18 LAB
BKR LAB AP GYN ADEQUACY: NORMAL
BKR LAB AP GYN INTERPRETATION: NORMAL
BKR LAB AP HPV REFLEX: NORMAL
BKR LAB AP PREVIOUS ABNORMAL: NORMAL
PATH REPORT.COMMENTS IMP SPEC: NORMAL
PATH REPORT.COMMENTS IMP SPEC: NORMAL
PATH REPORT.RELEVANT HX SPEC: NORMAL

## 2022-11-22 ENCOUNTER — OFFICE VISIT (OUTPATIENT)
Dept: URGENT CARE | Facility: URGENT CARE | Age: 62
End: 2022-11-22
Payer: COMMERCIAL

## 2022-11-22 VITALS
TEMPERATURE: 98.1 F | OXYGEN SATURATION: 99 % | SYSTOLIC BLOOD PRESSURE: 142 MMHG | RESPIRATION RATE: 12 BRPM | DIASTOLIC BLOOD PRESSURE: 82 MMHG | HEART RATE: 62 BPM

## 2022-11-22 DIAGNOSIS — R07.0 THROAT PAIN: ICD-10-CM

## 2022-11-22 DIAGNOSIS — J11.1 INFLUENZA-LIKE ILLNESS: Primary | ICD-10-CM

## 2022-11-22 LAB
DEPRECATED S PYO AG THROAT QL EIA: NEGATIVE
FLUAV AG SPEC QL IA: NEGATIVE
FLUBV AG SPEC QL IA: NEGATIVE
GROUP A STREP BY PCR: NOT DETECTED
HUMAN PAPILLOMA VIRUS 16 DNA: NEGATIVE
HUMAN PAPILLOMA VIRUS 18 DNA: NEGATIVE
HUMAN PAPILLOMA VIRUS FINAL DIAGNOSIS: NORMAL
HUMAN PAPILLOMA VIRUS OTHER HR: NEGATIVE

## 2022-11-22 PROCEDURE — 99213 OFFICE O/P EST LOW 20 MIN: CPT | Performed by: PHYSICIAN ASSISTANT

## 2022-11-22 PROCEDURE — 87804 INFLUENZA ASSAY W/OPTIC: CPT | Performed by: PHYSICIAN ASSISTANT

## 2022-11-22 PROCEDURE — 87651 STREP A DNA AMP PROBE: CPT | Performed by: PHYSICIAN ASSISTANT

## 2022-11-22 RX ORDER — ACETAMINOPHEN 500 MG
500-1000 TABLET ORAL EVERY 6 HOURS PRN
COMMUNITY
End: 2023-09-26

## 2022-11-22 RX ORDER — OMEGA-3 FATTY ACIDS/FISH OIL 300-1000MG
800 CAPSULE ORAL EVERY 4 HOURS PRN
COMMUNITY
End: 2023-08-29

## 2022-11-22 RX ORDER — LIDOCAINE HYDROCHLORIDE 20 MG/ML
15 SOLUTION OROPHARYNGEAL
Qty: 200 ML | Refills: 0 | Status: SHIPPED | OUTPATIENT
Start: 2022-11-22

## 2022-11-22 NOTE — LETTER
[facility] [place]  [address]  [facility] [address]  Phone: [phone]  Fax: [phone]    November 22, 2022        Mary Carbajal  [address] [facility] [address]          To whom it may concern:    RE: Mary Carbajal    Patient was seen and treated today at our clinic. Please excuse her absence from work 11/22, 11/23, and 11/25 due to illness.    Please contact me for questions or concerns.      Sincerely,        Marry Brasher PA-C

## 2022-11-22 NOTE — PATIENT INSTRUCTIONS
Adult Self-Care for Colds  Colds are caused by viruses. They can t be cured with antibiotics. However, you can relieve symptoms and support your body s efforts to heal itself. No matter which symptoms you have, be sure to drink plenty of fluids (water or clear soup); stop smoking and drinking alcohol; and get plenty of rest.      Understand a fever  Take your temperature several times a day. If your fever is 100.4 F (38.0 C) for more than a day, call your doctor.  Relax, lie down. Go to bed if you want. Just get off your feet and rest. Also, drink plenty of fluids to avoid dehydration.  Take acetaminophen or a nonsteroidal anti-inflammatory agent (NSAID), such as ibuprofen.  Treat a troubled nose kindly  Breathe steam or heated humidified air to open blocked nasal passages.  a hot shower or use a vaporizer. Be careful not to get burned by the steam.  Saline nasal sprays and Mucinex help open a stuffy nose. Antihistamines can also help, but they can cause side effects such as drowsiness and drying of the eyes, nose, and mouth.  Soothe a sore throat and cough  Gargle every 2 hours with 1/4 teaspoon of salt dissolved in 1/2 cup of warm water. Suck on throat lozenges and cough drops to moisten your throat (those containing menthol work best).  Cough medicines are available but it is unclear how effective they actually are.  Try honey for cough  Take acetaminophen or an NSAID, such as ibuprofen to ease throat pain  Ease digestive problems  Put fluid back into your body. Take frequent sips of clear liquids such as water or broth. Do not drink beverages with a lot of sugar in them, such as juices and sodas. These can make diarrhea worse. Older children and adults can drink sports drinks.  As your appetite returns, you can resume your normal diet. Ask your doctor whether there are any foods you should avoid.  When to seek medical care  When you first notice symptoms, ask your health care provider if antiviral  medications are appropriate. Antibiotics should not be taken for colds or flu. Also, call your doctor if you have any of the following symptoms or if you aren t feeling better after 7 days:  Shortness of breath  Pain or pressure in the chest or abdomen  Worsening symptoms, especially after a period of improvement  Fever of 100.4 F  (38.0 C) or higher, or fever that doesn t go down with medication  Sudden dizziness or confusion  Severe or continued vomiting  Signs of dehydration, including extreme thirst, dark urine, infrequent urination, dry mouth  Spotted, red, or very sore throat      [id number] [employer]. [address] [id number]. All rights reserved. This information is not intended as a substitute for professional medical care. Always follow your healthcare professional's instructions.

## 2022-11-22 NOTE — PROGRESS NOTES
Assessment/Plan:    No acute distress or toxicity noted. Lungs CTAB, no signs of pneumonia. Strep/flu tests negative. Suspect viral illness. Supportive treatments discussed- continue use of over the counter treatments such as ibuprofen, acetaminophen as needed. Also Rx viscous lidocaine for throat pain.    See patient instructions below.  At the end of the encounter, I discussed results, diagnosis, medications. Discussed red flags for immediate return to clinic/ER, as well as indications for follow up if no improvement. Patient understood and agreed to plan. Patient was stable for discharge.      ICD-10-CM    1. Influenza-like illness  J11.1 Influenza A & B Antigen - Clinic Collect      2. Throat pain  R07.0 Streptococcus A Rapid Screen w/Reflex to PCR - Clinic Collect     Group A Streptococcus PCR Throat Swab     lidocaine, viscous, (XYLOCAINE) 2 % solution            Return in about 1 week (around 11/29/2022) for Follow up w/ primary care provider if not better.    POLLY Villegas, ESTELA  [facility]    ------------------------------------------------------------------------------------------------------------------------------------------------------------------------  HPI:  Mary Carbajal is a 62 year old female who presents for evaluation of fever, myalgias, sore throat, and R ear pain onset 2 days ago. She is taking Tylenol and ibuprofen which help temporarily. Unknown Tmax. Patient reports no nasal congestion, cough, loss of sense of taste or smell, headache, chest pain, shortness of breath, abdominal pain, nausea, vomiting, diarrhea, rash, or any other symptoms. No known sick contacts/COVID exposure.       Past Medical History:   Diagnosis Date     Anxiety 1/29/2020     CARDIOVASCULAR SCREENING; LDL GOAL LESS THAN 160 10/31/2010     Cervicalgia 9/14/2018     Chronic allergic rhinitis due to other allergic trigger, unspecified seasonality 9/14/2018     Constipation,  unspecified constipation type 9/14/2018     Family history of diabetes mellitus 10/28/2013     GERD (gastroesophageal reflux disease) 3/17/2011     History of neck pain 1/19/2019     Hx of abdominal pain 1/19/2019     Insomnia, unspecified type 10/12/2018     Mild single current episode of major depressive disorder (H) 9/14/2018     Non-seasonal allergic rhinitis 9/14/2018     Palpitations 6/9/2022     Radial styloid tenosynovitis of left hand 11/9/2021     Skin macule 10/12/2018     Vaginal atrophy 10/8/2015       Vitals:    11/22/22 1037   BP: (!) 142/82   Cuff Size: Adult Regular   Pulse: 62   Resp: 12   Temp: 98.1  F (36.7  C)   TempSrc: Oral   SpO2: 99%       Physical Exam  Vitals and nursing note reviewed.   HENT:      Right Ear: Tympanic membrane and external ear normal.      Left Ear: Tympanic membrane and external ear normal.      Mouth/Throat:      Mouth: Mucous membranes are moist.      Pharynx: Uvula midline. Posterior oropharyngeal erythema present.      Tonsils: No tonsillar exudate or tonsillar abscesses.   Cardiovascular:      Rate and Rhythm: Normal rate and regular rhythm.   Pulmonary:      Effort: Pulmonary effort is normal.      Breath sounds: Normal breath sounds.   Lymphadenopathy:      Head:      Right side of head: Tonsillar (tender) adenopathy present.   Neurological:      Mental Status: She is alert.         Labs/Imaging:  Results for orders placed or performed in visit on 11/22/22 (from the past 24 hour(s))   Streptococcus A Rapid Screen w/Reflex to PCR - Clinic Collect    Specimen: Throat; Swab   Result Value Ref Range    Group A Strep antigen Negative Negative   Influenza A & B Antigen - Clinic Collect    Specimen: Nose; Swab   Result Value Ref Range    Influenza A antigen Negative Negative    Influenza B antigen Negative Negative    Narrative    Test results must be correlated with clinical data. If necessary, results should be confirmed by a molecular assay or viral culture.     No  results found for this or any previous visit (from the past 24 hour(s)).      There are no Patient Instructions on file for this visit.

## 2022-12-09 DIAGNOSIS — J30.2 SEASONAL ALLERGIES: ICD-10-CM

## 2022-12-13 RX ORDER — FLUTICASONE PROPIONATE 50 MCG
SPRAY, SUSPENSION (ML) NASAL
Qty: 16 ML | Refills: 11 | OUTPATIENT
Start: 2022-12-13

## 2022-12-13 NOTE — TELEPHONE ENCOUNTER
Patient given a year supply of medication on 11/16/22. Refusing refill request and closing encounter.     Grace Curry RN on 12/13/2022 at 12:30 PM

## 2023-02-20 ENCOUNTER — ANCILLARY PROCEDURE (OUTPATIENT)
Dept: GENERAL RADIOLOGY | Facility: CLINIC | Age: 63
End: 2023-02-20
Attending: PHYSICIAN ASSISTANT
Payer: COMMERCIAL

## 2023-02-20 ENCOUNTER — OFFICE VISIT (OUTPATIENT)
Dept: URGENT CARE | Facility: URGENT CARE | Age: 63
End: 2023-02-20
Payer: COMMERCIAL

## 2023-02-20 VITALS
OXYGEN SATURATION: 98 % | TEMPERATURE: 97.9 F | SYSTOLIC BLOOD PRESSURE: 110 MMHG | BODY MASS INDEX: 24.37 KG/M2 | RESPIRATION RATE: 16 BRPM | DIASTOLIC BLOOD PRESSURE: 68 MMHG | WEIGHT: 129 LBS

## 2023-02-20 DIAGNOSIS — R10.31 RLQ ABDOMINAL PAIN: ICD-10-CM

## 2023-02-20 DIAGNOSIS — K59.01 SLOW TRANSIT CONSTIPATION: Primary | ICD-10-CM

## 2023-02-20 DIAGNOSIS — M25.551 HIP PAIN, RIGHT: ICD-10-CM

## 2023-02-20 PROCEDURE — 74019 RADEX ABDOMEN 2 VIEWS: CPT | Mod: TC | Performed by: RADIOLOGY

## 2023-02-20 PROCEDURE — 73502 X-RAY EXAM HIP UNI 2-3 VIEWS: CPT | Mod: TC | Performed by: RADIOLOGY

## 2023-02-20 PROCEDURE — 99214 OFFICE O/P EST MOD 30 MIN: CPT | Performed by: PHYSICIAN ASSISTANT

## 2023-02-20 RX ORDER — POLYETHYLENE GLYCOL 3350 17 G/17G
1 POWDER, FOR SOLUTION ORAL DAILY
Qty: 85 G | Refills: 0 | Status: SHIPPED | OUTPATIENT
Start: 2023-02-20 | End: 2023-02-25

## 2023-02-20 RX ORDER — BISACODYL 5 MG/1
5 TABLET, DELAYED RELEASE ORAL DAILY PRN
Qty: 5 TABLET | Refills: 0 | Status: SHIPPED | OUTPATIENT
Start: 2023-02-20 | End: 2023-02-25

## 2023-02-20 NOTE — PROGRESS NOTES
URGENT CARE VISIT:    SUBJECTIVE:   Mary Carbajal is a 63 year old female who presents with abdominal pain for 3 days. Abdominal pain is located over RLQ and is described as sharp and cramping. Pain timing/severity is described as worsening and intermittent and moderate.  Pain is improved by nothing and worsened by movement. Associated symptoms include right hip and thigh pain. She denies nausea, vomiting, diarrhea, fever and chills. She has tried Tylenol and Ibuprofen with no relief of symptoms.  Appetite is normal. Risk factors include none. Abdominal surgical history includes appendectomy.    PMH:   Past Medical History:   Diagnosis Date     Anxiety 1/29/2020     CARDIOVASCULAR SCREENING; LDL GOAL LESS THAN 160 10/31/2010     Cervicalgia 9/14/2018     Chronic allergic rhinitis due to other allergic trigger, unspecified seasonality 9/14/2018     Constipation, unspecified constipation type 9/14/2018     Family history of diabetes mellitus 10/28/2013     GERD (gastroesophageal reflux disease) 3/17/2011     History of neck pain 1/19/2019     Hx of abdominal pain 1/19/2019     Insomnia, unspecified type 10/12/2018     Mild single current episode of major depressive disorder (H) 9/14/2018     Non-seasonal allergic rhinitis 9/14/2018     Palpitations 6/9/2022     Radial styloid tenosynovitis of left hand 11/9/2021     Skin macule 10/12/2018     Vaginal atrophy 10/8/2015     Allergies: Sinus & allergy [pseudoephedrine]  Medications:   Current Outpatient Medications   Medication Sig Dispense Refill     bisacodyl (DULCOLAX) 5 MG EC tablet Take 1 tablet (5 mg) by mouth daily as needed for constipation 5 tablet 0     fluticasone (FLONASE) 50 MCG/ACT nasal spray Spray 1 spray into both nostrils daily 16 g 11     polyethylene glycol (MIRALAX) 17 GM/Dose powder Take 17 g (1 capful.) by mouth daily for 5 days 85 g 0     acetaminophen (TYLENOL) 500 MG tablet Take 500-1,000 mg by mouth every 6 hours as needed for mild pain  (Patient not taking: Reported on 2/20/2023)       ibuprofen (ADVIL/MOTRIN) 200 MG capsule Take 200 mg by mouth every 4 hours as needed for fever (Patient not taking: Reported on 2/20/2023)       lidocaine, viscous, (XYLOCAINE) 2 % solution Swish and spit 15 mLs in mouth every 3 hours as needed for pain ; Max 8 doses/24 hour period. (Patient not taking: Reported on 2/20/2023) 200 mL 0     Social History:   Social History     Socioeconomic History     Marital status:      Spouse name: Sebastian     Number of children: 4     Years of education: Not on file     Highest education level: Not on file   Occupational History     Occupation: housekeeping   Tobacco Use     Smoking status: Never     Smokeless tobacco: Never   Vaping Use     Vaping Use: Never used   Substance and Sexual Activity     Alcohol use: No     Alcohol/week: 0.0 standard drinks     Drug use: No     Sexual activity: Yes     Partners: Male   Other Topics Concern     Parent/sibling w/ CABG, MI or angioplasty before 65F 55M? No   Social History Narrative     Not on file     Social Determinants of Health     Financial Resource Strain: Low Risk      Difficulty of Paying Living Expenses: Not hard at all   Food Insecurity: No Food Insecurity     Worried About Running Out of Food in the Last Year: Never true     Ran Out of Food in the Last Year: Never true   Transportation Needs: No Transportation Needs     Lack of Transportation (Medical): No     Lack of Transportation (Non-Medical): No   Physical Activity: Not on file   Stress: Not on file   Social Connections: Not on file   Intimate Partner Violence: Not on file   Housing Stability: Not on file       ROS: ROS otherwise found to be negative except as noted above.     OBJECTIVE:  /68 (BP Location: Right arm, Patient Position: Chair, Cuff Size: Adult Regular)   Temp 97.9  F (36.6  C) (Oral)   Resp 16   Wt 58.5 kg (129 lb)   LMP  (LMP Unknown)   SpO2 98%   BMI 24.37 kg/m    GENERAL APPEARANCE:  healthy, alert and no distress  EYES: EOMI,  PERRL, conjunctiva clear  RESP: lungs clear to auscultation - no rales, rhonchi or wheezes  CV: regular rates and rhythm, normal S1 S2, no murmur noted  ABDOMEN: soft, normal bowel sounds, tenderness moderate RLQ  MS: moderate TTP over right lateral hip and proximal thigh. FROM. Pain with abduction. FROM back. Pain with right lateral bending.   SKIN: no suspicious lesions or rashes    LABS:  Results for orders placed or performed in visit on 02/20/23   XR Hip Right 2-3 Views     Status: None    Narrative    XR HIP RIGHT 2-3 VIEWS 2/20/2023 12:51 PM     HISTORY: Hip pain, right    COMPARISON: None.      Impression    IMPRESSION: Normal.    TETE SEYMOUR MD         SYSTEM ID:  [id number]   Results for orders placed or performed in visit on 02/20/23   XR Abdomen 2 Views     Status: None    Narrative    XR ABDOMEN 2 VIEWS 2/20/2023 12:45 PM    HISTORY: RLQ abdominal pain    COMPARISON: None.      Impression    IMPRESSION: Large amount of stool throughout the colon likely  indicates a degree of constipation. Bowel gas pattern is  nonobstructed. No free intraperitoneal air.    HARSHAL HUNG MD         SYSTEM ID:  [id number]        ASSESSMENT:     ICD-10-CM    1. Slow transit constipation  K59.01 XR Abdomen 2 Views     polyethylene glycol (MIRALAX) 17 GM/Dose powder     bisacodyl (DULCOLAX) 5 MG EC tablet      2. Hip pain, right  M25.551 XR Hip Right 2-3 Views           PLAN:  35 minutes spent on the date of the encounter doing chart review, review of outside records, review of test results, interpretation of tests, patient visit and documentation   Patient Instructions   Constipation:  Patient was educated on the natural course of condition. X-ray of abdomen showed large amount of stool. Conservative measures discussed including increased water intake, high fiber diet, exercise,and analgesics (Tylenol) for pain.  Food such as plums, prunes, and pears will also help relieve  constipation.  See your primary care provider if symptoms worsen or do not improve in 5 days. Seek emergency care if you develop worsening abdominal pain or severe abdominal distention.     Right hip pain:  Patient was educated on the natural course of injury.  Likely MSK. Possible referred pain from RLQ abdomen due to constipation. X-ray of hip was negative. Conservative measures discussed including rest, ice, compression, elevation, and over-the-counter analgesics (Tylenol or Ibuprofen) as needed. See your primary care provider if symptoms worsen or do not improve in 7 days. Seek emergency care if you develop severe pain/swelling, inability to move extremity, skin paleness, or weakness.     Patient verbalized understanding and is agreeable to plan. The patient was discharged ambulatory and in stable condition.    Aria Malave PA-C ....................  2/20/2023   1:26 PM

## 2023-02-20 NOTE — PATIENT INSTRUCTIONS
Constipation:  Patient was educated on the natural course of condition. X-ray of abdomen showed large amount of stool. Conservative measures discussed including increased water intake, high fiber diet, exercise,and analgesics (Tylenol) for pain.  Food such as plums, prunes, and pears will also help relieve constipation.  See your primary care provider if symptoms worsen or do not improve in 5 days. Seek emergency care if you develop worsening abdominal pain or severe abdominal distention.     Right hip pain:  Patient was educated on the natural course of injury.  Likely MSK. Possible referred pain from RLQ abdomen due to constipation. X-ray of hip was negative. Conservative measures discussed including rest, ice, compression, elevation, and over-the-counter analgesics (Tylenol or Ibuprofen) as needed. See your primary care provider if symptoms worsen or do not improve in 7 days. Seek emergency care if you develop severe pain/swelling, inability to move extremity, skin paleness, or weakness.

## 2023-03-29 ENCOUNTER — OFFICE VISIT (OUTPATIENT)
Dept: FAMILY MEDICINE | Facility: CLINIC | Age: 63
End: 2023-03-29
Payer: COMMERCIAL

## 2023-03-29 VITALS
DIASTOLIC BLOOD PRESSURE: 66 MMHG | HEART RATE: 63 BPM | RESPIRATION RATE: 13 BRPM | OXYGEN SATURATION: 97 % | HEIGHT: 63 IN | BODY MASS INDEX: 23.14 KG/M2 | SYSTOLIC BLOOD PRESSURE: 136 MMHG | TEMPERATURE: 98.1 F | WEIGHT: 130.6 LBS

## 2023-03-29 DIAGNOSIS — F32.0 MILD SINGLE CURRENT EPISODE OF MAJOR DEPRESSIVE DISORDER (H): ICD-10-CM

## 2023-03-29 DIAGNOSIS — M54.41 ACUTE MIDLINE LOW BACK PAIN WITH RIGHT-SIDED SCIATICA: ICD-10-CM

## 2023-03-29 DIAGNOSIS — M70.61 TROCHANTERIC BURSITIS OF RIGHT HIP: ICD-10-CM

## 2023-03-29 DIAGNOSIS — K59.00 CONSTIPATION, UNSPECIFIED CONSTIPATION TYPE: ICD-10-CM

## 2023-03-29 DIAGNOSIS — R41.3 MEMORY LOSS: Primary | ICD-10-CM

## 2023-03-29 LAB
BASOPHILS # BLD AUTO: 0 10E3/UL (ref 0–0.2)
BASOPHILS NFR BLD AUTO: 1 %
EOSINOPHIL # BLD AUTO: 0.2 10E3/UL (ref 0–0.7)
EOSINOPHIL NFR BLD AUTO: 3 %
ERYTHROCYTE [DISTWIDTH] IN BLOOD BY AUTOMATED COUNT: 14.9 % (ref 10–15)
ERYTHROCYTE [SEDIMENTATION RATE] IN BLOOD BY WESTERGREN METHOD: 5 MM/HR (ref 0–30)
HCT VFR BLD AUTO: 38.7 % (ref 35–47)
HGB BLD-MCNC: 13.1 G/DL (ref 11.7–15.7)
LYMPHOCYTES # BLD AUTO: 2.9 10E3/UL (ref 0.8–5.3)
LYMPHOCYTES NFR BLD AUTO: 45 %
MCH RBC QN AUTO: 26.9 PG (ref 26.5–33)
MCHC RBC AUTO-ENTMCNC: 33.9 G/DL (ref 31.5–36.5)
MCV RBC AUTO: 80 FL (ref 78–100)
MONOCYTES # BLD AUTO: 0.5 10E3/UL (ref 0–1.3)
MONOCYTES NFR BLD AUTO: 7 %
NEUTROPHILS # BLD AUTO: 2.9 10E3/UL (ref 1.6–8.3)
NEUTROPHILS NFR BLD AUTO: 45 %
PLATELET # BLD AUTO: 199 10E3/UL (ref 150–450)
RBC # BLD AUTO: 4.87 10E6/UL (ref 3.8–5.2)
WBC # BLD AUTO: 6.5 10E3/UL (ref 4–11)

## 2023-03-29 PROCEDURE — 82607 VITAMIN B-12: CPT | Performed by: FAMILY MEDICINE

## 2023-03-29 PROCEDURE — 20610 DRAIN/INJ JOINT/BURSA W/O US: CPT | Performed by: FAMILY MEDICINE

## 2023-03-29 PROCEDURE — 80050 GENERAL HEALTH PANEL: CPT | Performed by: FAMILY MEDICINE

## 2023-03-29 PROCEDURE — 99214 OFFICE O/P EST MOD 30 MIN: CPT | Mod: 25 | Performed by: FAMILY MEDICINE

## 2023-03-29 PROCEDURE — 85652 RBC SED RATE AUTOMATED: CPT | Performed by: FAMILY MEDICINE

## 2023-03-29 PROCEDURE — 82746 ASSAY OF FOLIC ACID SERUM: CPT | Performed by: FAMILY MEDICINE

## 2023-03-29 PROCEDURE — 36415 COLL VENOUS BLD VENIPUNCTURE: CPT | Performed by: FAMILY MEDICINE

## 2023-03-29 PROCEDURE — 86780 TREPONEMA PALLIDUM: CPT | Performed by: FAMILY MEDICINE

## 2023-03-29 RX ORDER — BUPIVACAINE HYDROCHLORIDE 5 MG/ML
2 INJECTION, SOLUTION PERINEURAL ONCE
Status: COMPLETED | OUTPATIENT
Start: 2023-03-29 | End: 2023-03-29

## 2023-03-29 RX ORDER — BUPROPION HYDROCHLORIDE 150 MG/1
150 TABLET ORAL EVERY MORNING
Qty: 30 TABLET | Refills: 1 | Status: SHIPPED | OUTPATIENT
Start: 2023-03-29 | End: 2023-03-31

## 2023-03-29 RX ORDER — DEXAMETHASONE SODIUM PHOSPHATE 4 MG/ML
4 INJECTION, SOLUTION INTRA-ARTICULAR; INTRALESIONAL; INTRAMUSCULAR; INTRAVENOUS; SOFT TISSUE ONCE
Status: COMPLETED | OUTPATIENT
Start: 2023-03-29 | End: 2023-03-29

## 2023-03-29 RX ADMIN — BUPIVACAINE HYDROCHLORIDE 10 MG: 5 INJECTION, SOLUTION PERINEURAL at 15:11

## 2023-03-29 RX ADMIN — DEXAMETHASONE SODIUM PHOSPHATE 4 MG: 4 INJECTION, SOLUTION INTRA-ARTICULAR; INTRALESIONAL; INTRAMUSCULAR; INTRAVENOUS; SOFT TISSUE at 15:12

## 2023-03-29 NOTE — LETTER
April 3, 2023      Mary Carbajal  [address] [facility] [address]        Dear ,    We are writing to inform you of your test results.    Todos los análises de shakeel son normales. [place] noticias, sin ayuda     Resulted Orders   Treponema Abs w Reflex to RPR and Titer   Result Value Ref Range    Treponema Antibody Total Nonreactive Nonreactive   Folate   Result Value Ref Range    Folic Acid 34.3 4.6 - 34.8 ng/mL   Vitamin B12   Result Value Ref Range    Vitamin B12 1,036 232 - 1,245 pg/mL   TSH with free T4 reflex   Result Value Ref Range    TSH 1.55 0.30 - 4.20 uIU/mL   ESR: Erythrocyte sedimentation rate   Result Value Ref Range    Erythrocyte Sedimentation Rate 5 0 - 30 mm/hr   Comprehensive metabolic panel (BMP + Alb, Alk Phos, ALT, AST, Total. Bili, TP)   Result Value Ref Range    Sodium 139 136 - 145 mmol/L    Potassium 4.2 3.4 - 5.3 mmol/L    Chloride 102 98 - 107 mmol/L    Carbon Dioxide (CO2) 22 22 - 29 mmol/L    Anion Gap 15 7 - 15 mmol/L    Urea Nitrogen 12.7 8.0 - 23.0 mg/dL    Creatinine 0.77 0.51 - 0.95 mg/dL    Calcium 9.7 8.8 - 10.2 mg/dL    Glucose 94 70 - 99 mg/dL    Alkaline Phosphatase 62 35 - 104 U/L    AST 26 10 - 35 U/L    ALT 21 10 - 35 U/L    Protein Total 7.4 6.4 - 8.3 g/dL    Albumin 4.4 3.5 - 5.2 g/dL    Bilirubin Total <0.2 <=1.2 mg/dL    GFR Estimate 87 >60 mL/min/1.73m2      Comment:      eGFR calculated using 2021 CKD-EPI equation.   CBC with platelets and differential   Result Value Ref Range    WBC Count 6.5 4.0 - 11.0 10e3/uL    RBC Count 4.87 3.80 - 5.20 10e6/uL    Hemoglobin 13.1 11.7 - 15.7 g/dL    Hematocrit 38.7 35.0 - 47.0 %    MCV 80 78 - 100 fL    MCH 26.9 26.5 - 33.0 pg    MCHC 33.9 31.5 - 36.5 g/dL    RDW 14.9 10.0 - 15.0 %    Platelet Count 199 150 - 450 10e3/uL    % Neutrophils 45 %    % Lymphocytes 45 %    % Monocytes 7 %    % Eosinophils 3 %    % Basophils 1 %    Absolute Neutrophils 2.9 1.6 - 8.3 10e3/uL    Absolute Lymphocytes 2.9 0.8 - 5.3 10e3/uL     Absolute Monocytes 0.5 0.0 - 1.3 10e3/uL    Absolute Eosinophils 0.2 0.0 - 0.7 10e3/uL    Absolute Basophils 0.0 0.0 - 0.2 10e3/uL       If you have any questions or concerns, please call the clinic at the number listed above.       Sincerely,      David Bustos MD

## 2023-03-29 NOTE — PROGRESS NOTES
Assessment & Plan   Problem List Items Addressed This Visit     Acute midline low back pain with right-sided sciatica     Uncertain duration, lower lumbar with right-sided radiation.  Exam reassuring.  PT(previously unaffordable: Insurance coverage may be a factor) at least for 1 visit.  She promises to perform exercises faithfully.  SNRI         Relevant Medications    dexamethasone (DECADRON) injection 4 mg (Completed)    buPROPion (WELLBUTRIN XL) 150 MG 24 hr tablet    Other Relevant Orders    Treponema Abs w Reflex to RPR and Titer    Constipation, unspecified constipation type     In UC with abdominal pain, resolved.  Copious stool on x-ray.  Colonoscopy within predictive range.  Euthyroid last summer.  Suspect irritable bowel.  SNRI         Relevant Medications    buPROPion (WELLBUTRIN XL) 150 MG 24 hr tablet    Memory loss - Primary     Patient reports 2 episodes of forgetfulness.  First did not pass citizenship test.  Second left pot on the stove boiling, forgot.  She blames COVID vaccinations.  Early dementia possible, testing without English proficiency not terribly accurate.  [employer] database.  I suspect anxiety related to test taking.  She views this as a life-changing event, with potential of loss.  She has a residency, therefore urgency is less.  Discussed.  I do not think this is related to her vaccinations treat with SNRI, serologic review         Relevant Medications    buPROPion (WELLBUTRIN XL) 150 MG 24 hr tablet    Other Relevant Orders    Folate    Vitamin B12    TSH with free T4 reflex    ESR: Erythrocyte sedimentation rate (Completed)    CBC with platelets and differential (Completed)    Comprehensive metabolic panel (BMP + Alb, Alk Phos, ALT, AST, Total. Bili, TP)    Physical Therapy Referral    Mild single current episode of major depressive disorder (H)     I postulate recurrence with symptomatology described.  [employer] database, treat with SNRI         Relevant Medications    buPROPion  "(WELLBUTRIN XL) 150 MG 24 hr tablet    Trochanteric bursitis of right hip     Exam today shows discomfort at the right trochanteric bursa.  Steroid injection.  Monitor benefit x-ray of hip in UC \" normal\"         Relevant Medications    dexamethasone (DECADRON) injection 4 mg (Completed)    Bupivacaine 0.5 % Injection (Completed)    Other Relevant Orders    Large Joint/Bursa injection and/or drainage (Shoulder, Knee) (Completed)                     David Bustos MD  [facility] MARCIA Hernandez is a 63 year old, presenting for the following health issues:  Follow Up (Urgent care) and patient is forgetful    Additional Questions 3/29/2023   Roomed by Maria E CAPONE         Patient states she is having a hard time remembering things. She is forgetting her letters and to spell words. She left the kitchen stove on and remembered 5 hours later.     ED/UC Followup:    Facility:  [facility]  Date of visit: 2/20/23  Reason for visit: Slow transit constipation   Current Status: states the pain comes and goers. There are days when all her body is in pain           Review of Systems   As described      Objective    /66 (BP Location: Right arm, Patient Position: Sitting, Cuff Size: Adult Regular)   Pulse 63   Temp 98.1  F (36.7  C) (Oral)   Resp 13   Ht 1.6 m (5' 3\")   Wt 59.2 kg (130 lb 9.6 oz)   LMP  (LMP Unknown)   SpO2 97%   BMI 23.13 kg/m    Body mass index is 23.13 kg/m .  Physical Exam   BACK: Able to flex within3 inches of the floor, normal heel toe, negative SLR.  Right hip pain reproduced with internal/external rotation as well as palpation overlying trochanteric bursa    Informed consent discussed, signed. Time out taken. After the verification of the absence of allergies, with betadine prep, in sterile fashion  MARCAINE without epinepherine and  *4 mg Dexamethasone  was instilled into the right trochanteric bursa   with  0 blood " loss with questionable resolution of discomfort.        David Bustos MD

## 2023-03-30 DIAGNOSIS — M54.41 ACUTE MIDLINE LOW BACK PAIN WITH RIGHT-SIDED SCIATICA: ICD-10-CM

## 2023-03-30 DIAGNOSIS — K59.00 CONSTIPATION, UNSPECIFIED CONSTIPATION TYPE: ICD-10-CM

## 2023-03-30 DIAGNOSIS — R41.3 MEMORY LOSS: ICD-10-CM

## 2023-03-30 LAB
ALBUMIN SERPL BCG-MCNC: 4.4 G/DL (ref 3.5–5.2)
ALP SERPL-CCNC: 62 U/L (ref 35–104)
ALT SERPL W P-5'-P-CCNC: 21 U/L (ref 10–35)
ANION GAP SERPL CALCULATED.3IONS-SCNC: 15 MMOL/L (ref 7–15)
AST SERPL W P-5'-P-CCNC: 26 U/L (ref 10–35)
BILIRUB SERPL-MCNC: <0.2 MG/DL
BUN SERPL-MCNC: 12.7 MG/DL (ref 8–23)
CALCIUM SERPL-MCNC: 9.7 MG/DL (ref 8.8–10.2)
CHLORIDE SERPL-SCNC: 102 MMOL/L (ref 98–107)
CREAT SERPL-MCNC: 0.77 MG/DL (ref 0.51–0.95)
DEPRECATED HCO3 PLAS-SCNC: 22 MMOL/L (ref 22–29)
FOLATE SERPL-MCNC: 34.3 NG/ML (ref 4.6–34.8)
GFR SERPL CREATININE-BSD FRML MDRD: 87 ML/MIN/1.73M2
GLUCOSE SERPL-MCNC: 94 MG/DL (ref 70–99)
POTASSIUM SERPL-SCNC: 4.2 MMOL/L (ref 3.4–5.3)
PROT SERPL-MCNC: 7.4 G/DL (ref 6.4–8.3)
SODIUM SERPL-SCNC: 139 MMOL/L (ref 136–145)
T PALLIDUM AB SER QL: NONREACTIVE
TSH SERPL DL<=0.005 MIU/L-ACNC: 1.55 UIU/ML (ref 0.3–4.2)
VIT B12 SERPL-MCNC: 1036 PG/ML (ref 232–1245)

## 2023-03-30 NOTE — ASSESSMENT & PLAN NOTE
Uncertain duration, lower lumbar with right-sided radiation.  Exam reassuring.  PT(previously unaffordable: Insurance coverage may be a factor) at least for 1 visit.  She promises to perform exercises faithfully.  SNRI

## 2023-03-30 NOTE — ASSESSMENT & PLAN NOTE
In UC with abdominal pain, resolved.  Copious stool on x-ray.  Colonoscopy within predictive range.  Euthyroid last summer.  Suspect irritable bowel.  SNRI

## 2023-03-30 NOTE — ASSESSMENT & PLAN NOTE
Patient reports 2 episodes of forgetfulness.  First did not pass citizenship test.  Second left pot on the stove boiling, forgot.  She blames COVID vaccinations.  Early dementia possible, testing without English proficiency not terribly accurate.  [employer] database.  I suspect anxiety related to test taking.  She views this as a life-changing event, with potential of loss.  She has a residency, therefore urgency is less.  Discussed.  I do not think this is related to her vaccinations treat with SNRI, serologic review

## 2023-03-30 NOTE — ASSESSMENT & PLAN NOTE
"Exam today shows discomfort at the right trochanteric bursa.  Steroid injection.  Monitor benefit x-ray of hip in UC \" normal\"  "

## 2023-03-31 RX ORDER — BUPROPION HYDROCHLORIDE 150 MG/1
TABLET ORAL
Qty: 90 TABLET | Refills: 1 | Status: SHIPPED | OUTPATIENT
Start: 2023-03-31 | End: 2023-08-29

## 2023-03-31 NOTE — TELEPHONE ENCOUNTER
Routing refill request to provider for review/approval because:  Okay for 90 day supply? Insurance asking for change in quantity     Nancy Centeno Registered Nurse  [facility]

## 2023-04-03 NOTE — RESULT ENCOUNTER NOTE
All of the blood tests are normal. Good news, no help    Todos los análises de shakeel son normales. [place] noticias, sin ayuda    ASIM AMAYA

## 2023-07-03 ENCOUNTER — OFFICE VISIT (OUTPATIENT)
Dept: FAMILY MEDICINE | Facility: CLINIC | Age: 63
End: 2023-07-03
Payer: COMMERCIAL

## 2023-07-03 ENCOUNTER — ANCILLARY PROCEDURE (OUTPATIENT)
Dept: GENERAL RADIOLOGY | Facility: CLINIC | Age: 63
End: 2023-07-03
Attending: FAMILY MEDICINE
Payer: COMMERCIAL

## 2023-07-03 VITALS
SYSTOLIC BLOOD PRESSURE: 120 MMHG | HEART RATE: 70 BPM | WEIGHT: 127 LBS | TEMPERATURE: 97.1 F | HEIGHT: 63 IN | BODY MASS INDEX: 22.5 KG/M2 | DIASTOLIC BLOOD PRESSURE: 70 MMHG | RESPIRATION RATE: 20 BRPM | OXYGEN SATURATION: 96 %

## 2023-07-03 DIAGNOSIS — M25.511 BILATERAL SHOULDER PAIN, UNSPECIFIED CHRONICITY: Primary | ICD-10-CM

## 2023-07-03 DIAGNOSIS — M25.512 BILATERAL SHOULDER PAIN, UNSPECIFIED CHRONICITY: ICD-10-CM

## 2023-07-03 DIAGNOSIS — M18.0 PRIMARY OSTEOARTHRITIS OF BOTH FIRST CARPOMETACARPAL JOINTS: ICD-10-CM

## 2023-07-03 DIAGNOSIS — M77.11 LATERAL EPICONDYLITIS OF RIGHT ELBOW: ICD-10-CM

## 2023-07-03 DIAGNOSIS — M25.511 BILATERAL SHOULDER PAIN, UNSPECIFIED CHRONICITY: ICD-10-CM

## 2023-07-03 DIAGNOSIS — M25.512 BILATERAL SHOULDER PAIN, UNSPECIFIED CHRONICITY: Primary | ICD-10-CM

## 2023-07-03 DIAGNOSIS — M77.00 MEDIAL EPICONDYLITIS OF ELBOW, UNSPECIFIED LATERALITY: ICD-10-CM

## 2023-07-03 PROBLEM — M77.10 LATERAL EPICONDYLITIS, UNSPECIFIED LATERALITY: Status: ACTIVE | Noted: 2023-07-03

## 2023-07-03 PROCEDURE — 99214 OFFICE O/P EST MOD 30 MIN: CPT | Performed by: FAMILY MEDICINE

## 2023-07-03 PROCEDURE — 73030 X-RAY EXAM OF SHOULDER: CPT | Mod: TC | Performed by: RADIOLOGY

## 2023-07-03 RX ORDER — MELOXICAM 15 MG/1
15 TABLET ORAL DAILY
Qty: 90 TABLET | Refills: 0 | Status: SHIPPED | OUTPATIENT
Start: 2023-07-03 | End: 2023-08-29

## 2023-07-03 ASSESSMENT — PATIENT HEALTH QUESTIONNAIRE - PHQ9: SUM OF ALL RESPONSES TO PHQ QUESTIONS 1-9: 5

## 2023-07-03 ASSESSMENT — PAIN SCALES - GENERAL: PAINLEVEL: EXTREME PAIN (8)

## 2023-07-03 NOTE — PROGRESS NOTES
"Patient has upper arm osteoarthritis and tendinitis in a variety of areas currently untreated.  We shall initiate oral as well as topical nonsteroidal therapies.  She cannot afford PT.  She is using a brace of some sort purchased at the pharmacy for her wrists at work.  How this affects her thumb joint is unknown.  We shall have care coordination speak with the patient about various options.  She may need someone to review her Social Security benefits  Assessment & Plan   Problem List Items Addressed This Visit     Bilateral shoulder pain, unspecified chronicity - Primary     Pain to palpation of AC joint bilaterally, manipulation of the shoulder does not yield specific rotator cuff findings         Relevant Orders    XR Shoulder Left G/E 3 Views (Completed)    XR Shoulder Right G/E 3 Views (Completed)    Primary Care - Care Coordination Referral    Lateral epicondylitis of right elbow     Right lateral epicondyles tender to palpation         Relevant Medications    meloxicam (MOBIC) 15 MG tablet    Medial epicondylitis of elbow, unspecified laterality     Medial epicondyles tender bilaterally to palpation         Relevant Medications    meloxicam (MOBIC) 15 MG tablet    Primary osteoarthritis of both first carpometacarpal joints     Patient causes her wrist.  Previously radiographically demonstrated, tender at this joint bilaterally         Relevant Medications    meloxicam (MOBIC) 15 MG tablet      Patient presents with discomfort in both arms.  She presented with \"wrist\" pain previously, was referred for specialty care.  She discovered that her insurance did not cover specialty care, physical therapy, nor occupational therapy.  She stopped going due to rapidly expanding medical bills.  Since then, her discomfort has expanded to involve both arms.  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  She reports that a physician that she saw  recommended that she take 6 months " "off from work to \"see if it would get better\".  The veracity of this recommendation is questionable.  Patient reports that going to work makes her arms hurt so much that she cries at almost every shift.  She reports that she was given a medicine, but ran out quickly and she had no refills.  She does not know what that was  She reports that she has been interested in skilled nursing the last few years because of this discomfort.  She is not aware of her financial situation               David Bustos MD  [facility] BRISA Hernandez is a 63 year old, presenting for the following health issues:  Musculoskeletal Problem        7/3/2023     2:33 PM   Additional Questions   Roomed by Bhargavi CAPONE     Concern -  Wrist Pain  Onset: 6 months.  Description: Swelling and pain on both wrists, the pain is starting to radiate to her arms.   Intensity: 9/10  Progression of Symptoms:  worsening  Accompanying Signs & Symptoms: Swelling.   Previous history of similar problem: No.  Precipitating factors:        Worsened by: Working.  Alleviating factors:        Improved by: Therapy, but can no longer attend as it is not covered by insurance.    Therapies tried and outcome: None          Review of Systems   No fevers no trauma no synovitis      Objective    /70 (BP Location: Right arm, Patient Position: Sitting, Cuff Size: Adult Regular)   Pulse 70   Temp 97.1  F (36.2  C) (Oral)   Resp 20   Ht 1.6 m (5' 3\")   Wt 57.6 kg (127 lb)   LMP  (LMP Unknown)   SpO2 96%   BMI 22.50 kg/m    Body mass index is 22.5 kg/m .  Physical Exam   Exam shows tenderness in the areas described.  There is no synovitis.  There is no deformity.  Chart review indicates a recent normal ESR      X-rays of shoulder suggest AC joint spurring  Previous x-rays of wrists reviewed    David Bustos MD              "

## 2023-07-03 NOTE — ASSESSMENT & PLAN NOTE
Medial epicondyles tender bilaterally to palpation  
Pain to palpation of AC joint bilaterally, manipulation of the shoulder does not yield specific rotator cuff findings  
Patient causes her wrist.  Previously radiographically demonstrated, tender at this joint bilaterally  
Right lateral epicondyles tender to palpation  
Yes

## 2023-07-05 ENCOUNTER — PATIENT OUTREACH (OUTPATIENT)
Dept: CARE COORDINATION | Facility: CLINIC | Age: 63
End: 2023-07-05
Payer: COMMERCIAL

## 2023-07-05 NOTE — PROGRESS NOTES
Clinic Care Coordination Contact  [facility]/Voicemail  Left VM with the assistance of a  ID: [id number]      Clinical Data: Care Coordinator Outreach  Outreach attempted x 1.  Left message on patient's voicemail with call back information and requested return call.    Reason for Referral: Financial Support  Financial Support: Other - Use Comments  Clinical Staff have discussed the Care Coordination Referral with the patient and/or caregiver: Yes  Additional Information: Current position aggravates OA. Needs transition assistance, likely another position      Plan: Care Coordinator will try to reach patient again in 1-2 business days.        Saira العراقي Public Health  Community Health Worker  [facility]  Clinic Care Coordination  [phone]

## 2023-07-06 NOTE — PROGRESS NOTES
Clinic Care Coordination Contact  Community Health Worker Initial Outreach    CHW Initial Information Gathering:  Referral Source: PCP  Current living arrangement:: I live in a private home with family  Type of residence::  (Living in a private home with spouse and family)  Community Resources: None  Supplies Currently Used at Home: None  Equipment Currently Used at Home: none  Informal Support system:: Family, Spouse  No PCP office visit in Past Year: No  Transportation means:: Regular car    For initial CC SW assessment, please call her direct number: [phone]       Patient accepts CC: Yes. Patient scheduled for assessment with CC SW on 7/11/2023 at 10:0 AM. Patient noted desire to discuss Social Security Disability/financial support.     7-6, CHW:    CHW was able to connect with the Patient with the assistance of a  ID: [id number] and introduce self/care coordination and intent of call.     Patient shares that she would like to continue working, however she does feel as though her osteoarthritis and tendinitis will prevent her from successfully working alternative jobs.     Writer inquired if the Patient had any other questions or concerns, however she does not at this time.     Writer provided the Patient with Daiana LEBRON's, phone number.       Saira العراقي [facility]  Community Health Worker  [facility]  Clinic Care Coordination  [phone]  Covering for Daiana LEBRON.

## 2023-07-12 ENCOUNTER — APPOINTMENT (OUTPATIENT)
Dept: INTERPRETER SERVICES | Facility: CLINIC | Age: 63
End: 2023-07-12
Payer: COMMERCIAL

## 2023-07-17 NOTE — PROGRESS NOTES
Patient did not answer for SW assessment due to working during the day and not having service. She prefers calls anytime after 3pm.     Rescheduled with Dhaval Roldan, covering SW CC on 7/18/23 at 3:00pm.     Daiana Paz, VI, B.S. [facility] Care Coordination  [facility]:  Apple Gordo Lara  [phone]  [email or web address]

## 2023-07-18 ENCOUNTER — PATIENT OUTREACH (OUTPATIENT)
Dept: NURSING | Facility: CLINIC | Age: 63
End: 2023-07-18
Payer: COMMERCIAL

## 2023-07-18 NOTE — LETTER
[facility] COORDINATION  [address]  [place] [address]    August 11, 2023    Mary Carbajal  [address] [place] [place] [address]      Dear Mary,    Below are the resources we discussed. I know you weren't sure if you wanted to apply for Medical Assistance or Social Security Disability but I wanted to send you this information to have on hand to look back to.        Enclosed: Financial Assistance and Community Resources      Medical Assistance:   If you are interested in inquiring and/or applying for medical assistance, I will provide information below as to how to access the application (in paper or online) as well as a few community resources that offer free application guidance and assistance. Here is the website to start:     Https://www.co.[place]./HealthFamily/PublicAssistance/Medical/Pages/default.aspx    Before applying for public assistance, you can find out what programs you are eligible for by using the Bridge to Benefits screening tool here: http://[place].bridgetobenefits.org/Home2  The tool is easy, confidential and free.     A customer self-service lab is available at the [facility], which provides computer access and assistance in applying for MNsure. The lab is open weekdays from 8 a.m.-3 p.m.     Community partners also help to sign up for MNsure, call them to set up an appointment:     [address] [place] [address] ([phone])   [address] [place] [address] ([phone])   [facility]  in [place] ([phone])   [facility] ([phone])   [facility] ([phone])   By phone   You can request an application form by calling [phone]. When you call for an application, you will need to give the following information: Your name, full postal address and zip code, phone number where you can be reached in case we have questions, the type of assistance you are requesting, and whether you are single or have children under age  "21 living with you.     In person   You can  applications at each of these locations within the county:     [facility] (from  or Public Health)   [address] [place] [address]     [facility]   [address]   [place] [address]     [facility]   [address]   [place] [address]     [facility] (from Employee Relations)   [address] [place] [id number]     [facility]   [address] [place] [address]     Submitting an application   You can mail or drop off your application at:   [facility]   [address]   [place] [address]     Your application will be assigned to a financial worker right away. Please answer all questions on the application.     Information you need to provide   In addition to the application form, you will be required to provide verification of identity, age, citizenship, residence, property, income and more.  A list of acceptable verification is available.      Other Financial [place]/Programs:       -The 30 Days [facility]: \"You must email your request to this address: [email or web address]   If you do not have access to email, you can text us at  [phone]. Do not mail requests.  Under no circumstances should you call in to request assistance. We will only except email requests or texts. This is for your safety, so you can see all documentation concerning your request.  You must be a resident of [place].\"  https://www.mba39-hfgqwcxydlvshl.org/    -[employer]: Offers one-time assistance for specific needs, typically smaller amounts. May help with a security deposit. https://popmn.org/mission/mission-outpost/    -[employer]:  [facility] [place]  [address], [place] [address]  Phone: (677) " "[phone]  Https://[address] [place] [address].org/contact/  https://stage2.[address] [place] [address].org/resources/family-resource-centers/    -[facility] Emergency Assistance: \"Emergency assistance programs are short-term assistance (usually a one-time payment) for people experiencing a financial hardship such as an eviction or utility shutoff. Any income not needed for basic needs will be considered available to meet the emergency as well as any cash or other assets you might have.\"  Https://www.co.[place].mn./HealthFamily/PublicAssistance/Emergency/Pages/default.aspx    -[employer]: With close to 50 [employer] locations throughout the [facility], you re sure to find one that fits your career development or talent matching needs. [employer] is committed to helping:  Individuals start, advance or change their career  Employers attract, develop and retain talent, and  [place] thrive economically  As a collaborative group of private, public and nonprofit partners, we offer an impressive range of innovative services, including one-on-one guidance, training, networking, labor market information and much more. We are a rich resource for anyone, from any walk of life, at any career or recruitment stage.  [address] [place] [address] [phone]   [email or web address].  Https://www.[employer].com/azucena      If you wanted to consider applying for long term disability benefits the instructions are below:    Applying for Social Security Disability Benefits  Website: https://www.ssa.gov/applyfordisability/    Social Security offers an online disability application you can complete at your convenience. Apply from the comfort of your home or any location at a time most convenient for you. You do not need to drive to your local Social Security office or wait for an appointment with a Social .     Who can apply for adult disability benefits online?  You can use the online " application to apply for disability benefits if you:  Are age 18 or older;   Are not currently receiving benefits on your own Social Security record;   Are unable to work because of a medical condition that is expected to last at least 12 months or result in death: and  Have not been denied disability benefits in the last 60 days. If your application was recently denied for medical reasons, the Internet Appeal is a starting point to request a review of the medical determination we made.     How do I apply for benefits?  Here is what you need to do to apply for benefits online:   Print and review the Adult Disability Checklist.  It will help you gather the information you need to complete the application.  Complete the Disability Benefit Application.  Complete the Medical Release Form    What other ways can I apply?  You can also apply:   By phone - Call us at [phone] from 7 a.m. to 7 p.m. Monday through Friday. If you are deaf or hard of hearing, you can call us at TTY [phone].    In person - Visit your local Social Security office. (Call first to make an appointment.)   If you do not live in the [place] or one of its territories, you can also contact your nearest Federal Benefits Unit that provides service to your country of residence.     If you find yourself having more questions/needing more assistance with the process of applying for Disability Benefits, here are some additional resources you can tap into a :    Disability Specialists: 1-357.323.5064 or http://www.disabilityspecialists.net/  Disability Specialists strive to make the Social Security Disability application as simple and straightforward as possible.  Please call them toll-free at [phone] to request an intake interview, or just complete the questionnaire on the main website, and they will contact you.  They work in 120 different languages. When you call, state the language you prefer, then wait a moment  while they get someone on the line to help. When you complete the initial interview through the claims team, you will be assigned to an individual representative.Disability Specialists is paid only if they obtain benefits for you. If we don t get you benefits, we are paid nothing no matter how long we worked for you and no matter how much money we spent attempting to get you benefits. For some cases, there is no charge to you because we are paid by the [facility] or [facility] government to assist you. For other cases, the law limits our fee to a percentage of the past due benefits that we obtain for you and our fee is paid to us by the Social Security Administration. For every case, nothing is taken from your monthly benefit check and you have no upfront costs.    Quality Disability Services: 4-296-955-1588 or https://www.qualitydisabilityCellARide.com/     Disability Hub: 2-531-474-6423 or https://disabilityhubmn.org/    [employer] [address], [place] [address] [phone]  Website: www.[employer].  [address], [place] [address]  [phone]  Website: www.InsuranceLibrary.com    [facility]   [address] [place] [address] [phone]   Website: www.co.[place].mn.      [employer]  [address] [place] [id number] [phone] or [phone]  Website: www.disabilitypartners.net            Please feel free to contact me at [phone] with any questions or concerns.     Dhaval Roldan, [facility]  Clinic Care Coordinator  [facility]  [phone]  [email or web address]

## 2023-07-18 NOTE — LETTER
[facility]  Patient Centered Plan of Care  About Me:        Patient Name:  Mary Carbajal    YOB: 1960  Age:         63 year old   Román MRN:    [id number] Telephone Information:  Home Phone [phone]   Mobile [phone]   Mobile [phone]       Address:  [address] [place] [address] Email address:  No e-mail address on record      Emergency Contact(s)    Name Relationship Lgl Grd Work Phone Home Phone Mobile Phone   MONTSERRAT ALEMAN Spouse   [phone]            Primary language:  Central [place]     needed? Yes   [facility]:  [phone] op. 1  Other communication barriers:No data recorded  Preferred Method of Communication:  Mail  Current living arrangement: I live in a private home with family    Mobility Status/ Medical Equipment: No data recorded      My Access Plan  Medical Emergency 911   Primary Clinic Line [facility] - [phone]   24 Hour Appointment Line [phone] or  [phone]) (toll-free)   24 Hour Nurse Line [phone] (toll-free)   Preferred Urgent Care No data recorded   Preferred Hospital No data recorded   Preferred Pharmacy [id number] [place], [place] - [address]     Behavioral Health Crisis Line The National Suicide Prevention Lifeline at 1-216.674.9957 or Text/Call 498             My Care Team Members  Patient Care Team         Relationship Specialty Notifications Start End    David Bustos MD PCP - General Family Practice  8/15/17     Phone: [phone] Fax: [phone] [address] [facility] [address]    David Bustos MD Assigned PCP   11/21/21     Phone: [phone] Fax: [phone] [address] [facility] [address]    Rubi Olmos, RN Personal Advocate & Liaison (PAL) Family Medicine Admissions 7/5/23     [phone]    Dhaval Roldan LSW Lead Care Coordinator Primary Care -  Admissions 7/17/23     Phone: [phone]          Dainaa Paz MA Community Health Worker Primary Care - CC Admissions 7/18/23     Phone: [phone]                   My Care Plans  Self Management and Treatment Plan  Care Plan  Care Plan: Financial Wellbeing       Problem: Patient expresses financial resource strain       Goal: Create an action plan to increase financial stability       Start Date: 7/18/2023 Expected End Date: 11/1/2023    Note:     Goal Statement: I will continue to take steps to further support my financial wellbeing over the next 3 month(s).  Barriers: Pain  Strengths: Strong support from family.  Patient expressed understanding of goal: yes    Action steps to achieve this goal:  I will continue to lean on my informal supports of my: family  I will review resources and supports sent to me via Mail and consider outreaching and establishing with one or more which interest me.  I will continue to work on the application(s) for:  financial assistance programs/grants as needed, desired and consider medical assistance and Social Security Disability.   I will use the clinic as a resource and I understand I can contact my clinic with 24/7 after hours services available.   I will continue to outreach to care coordination as needed for additional resources or supports.                                   My Medical and Care Information  Problem List   Patient Active Problem List   Diagnosis    CARDIOVASCULAR SCREENING; LDL GOAL LESS THAN 160    GERD (gastroesophageal reflux disease)    Family history of diabetes mellitus    Vaginal atrophy    Mild single current episode of major depressive disorder (H)    Constipation, unspecified constipation type    Seasonal allergies    Insomnia, unspecified type    Skin macule    Dysesthesia    Dysfunction of right eustachian tube    Anxiety    TMJ (temporomandibular joint syndrome)    Aphthae, oral    Flu vaccine need    COVID-19 vaccine administered    Need for prophylactic vaccination and  inoculation against influenza    Primary osteoarthritis of both first carpometacarpal joints    Palpitations    Encounter for immunization    Encounter for preventive health examination    Cervical cancer screening    Sacroiliac joint pain    Trochanteric bursitis of right hip    Acute midline low back pain with right-sided sciatica    Memory loss    Bilateral shoulder pain, unspecified chronicity    Lateral epicondylitis of right elbow    Medial epicondylitis of elbow, unspecified laterality      Current Medications and Allergies:     Allergies   Allergen Reactions    Sinus & Allergy [Pseudoephedrine]      Gets sinuses from pollin      Current Outpatient Medications   Medication    acetaminophen (TYLENOL) 500 MG tablet    buPROPion (WELLBUTRIN XL) 150 MG 24 hr tablet    fluticasone (FLONASE) 50 MCG/ACT nasal spray    ibuprofen (ADVIL/MOTRIN) 200 MG capsule    lidocaine, viscous, (XYLOCAINE) 2 % solution    meloxicam (MOBIC) 15 MG tablet     No current facility-administered medications for this visit.         Care Coordination Start Date: 7/3/2023   Frequency of Care Coordination: monthly     Form Last Updated: 08/11/2023

## 2023-07-18 NOTE — PROGRESS NOTES
Clinic Care Coordination Contact    Clinic Care Coordination Contact  OUTREACH    Referral Information:  Referral Source: PCP         Chief Complaint   Patient presents with    Clinic Care Coordination - Initial     Resources for Support; Temporary Financial Supports        Universal Utilization:   Clinic Utilization  No PCP office visit in Past Year: No  Utilization      Hospital Admissions  0             ED Visits  0             No Show Count (past year)  1                    Current as of: 7/17/2023  9:22 PM                Clinical Concerns:  Current Medical Concerns:    Patient Active Problem List   Diagnosis    CARDIOVASCULAR SCREENING; LDL GOAL LESS THAN 160    GERD (gastroesophageal reflux disease)    Family history of diabetes mellitus    Vaginal atrophy    Mild single current episode of major depressive disorder (H)    Constipation, unspecified constipation type    Seasonal allergies    Insomnia, unspecified type    Skin macule    Dysesthesia    Dysfunction of right eustachian tube    Anxiety    TMJ (temporomandibular joint syndrome)    Aphthae, oral    Flu vaccine need    COVID-19 vaccine administered    Need for prophylactic vaccination and inoculation against influenza    Primary osteoarthritis of both first carpometacarpal joints    Palpitations    Encounter for immunization    Encounter for preventive health examination    Cervical cancer screening    Sacroiliac joint pain    Trochanteric bursitis of right hip    Acute midline low back pain with right-sided sciatica    Memory loss    Bilateral shoulder pain, unspecified chronicity    Lateral epicondylitis of right elbow    Medial epicondylitis of elbow, unspecified laterality         Psychiatric outreached to pt. With assistance of . Pt states she is struggling with arthritis pain in her hands which makes it difficult to work. Pt  is currently providing financially but pt has not been able to work for the last 3-6 months. Pt works  for a small company with only one other employee and does not have short term disability benefits.     Pt is looking for short term financial assistance. Pt agreed to [facility] sending resources in the mail.     Current Behavioral Concerns: none noted or identified.     Education Provided to patient: Financial resources in the area.       Health Maintenance Reviewed:  There are no preventive care reminders to display for this patient.    Clinical Pathway: None    Medication Management:  Medication review status: Medications reviewed and no changes reported per patient.          Living Situation:  Current living arrangement:: I live in a private home with family  Type of residence::  (Living in a private home with spouse and family)    Lifestyle & Psychosocial Needs:    Social Determinants of Health     Tobacco Use: Low Risk  (7/3/2023)    Patient History     Smoking Tobacco Use: Never     Smokeless Tobacco Use: Never     Passive Exposure: Not on file   Alcohol Use: Not At Risk (11/9/2021)    AUDIT-C     Frequency of Alcohol Consumption: Never     Average Number of Drinks: Patient refused     Frequency of Binge Drinking: Never   Financial Resource Strain: Low Risk  (11/9/2021)    Overall Financial Resource Strain (CARDIA)     Difficulty of Paying Living Expenses: Not hard at all   Food Insecurity: No Food Insecurity (11/9/2021)    Hunger Vital Sign     Worried About Running Out of Food in the Last Year: Never true     Ran Out of Food in the Last Year: Never true   Transportation Needs: No Transportation Needs (11/9/2021)    PRAPARE - Transportation     Lack of Transportation (Medical): No     Lack of Transportation (Non-Medical): No   Physical Activity: Sufficiently Active (11/9/2021)    Exercise Vital Sign     Days of Exercise per Week: 4 days     Minutes of Exercise per Session: 120 min   Stress: Stress Concern Present (11/9/2021)    Citizen of [place] [employer] - Occupational Stress Questionnaire      Feeling of Stress : To some extent   Social Connections: Moderately Integrated (11/9/2021)    Social Connection and Isolation Panel [NHANES]     Frequency of Communication with Friends and Family: More than three times a week     Frequency of Social Gatherings with Friends and Family: Twice a week     Attends Muslim Services: More than 4 times per year     Active Member of Clubs or Organizations: No     Attends Club or Organization Meetings: Not on file     Marital Status:    Intimate Partner Violence: Not on file   Depression: Not at risk (7/3/2023)    PHQ-2     PHQ-2 Score: 2   Housing Stability: Unknown (11/9/2021)    Housing Stability Vital Sign     Unable to Pay for Housing in the Last Year: No     Number of Places Lived in the Last Year: Not on file     Unstable Housing in the Last Year: No        Transportation means:: Regular car     Informal Support system:: Family, Spouse     Care Coordinator has reviewed patient's Social Determinants of Health (SDoH) on this date. Upon review, changes were not  made. .     Resources and Interventions:  Community Resources: None  Supplies Currently Used at Home: None  Equipment Currently Used at Home: none          Care Plan:  Care Plan: Financial Wellbeing       Problem: Patient expresses financial resource strain       Goal: Create an action plan to increase financial stability       Start Date: 7/18/2023 Expected End Date: 11/1/2023    Note:     Goal Statement: I will continue to take steps to further support my financial wellbeing over the next 3 month(s).  Barriers: Pain  Strengths: Strong support from family.  Patient expressed understanding of goal: yes    Action steps to achieve this goal:  I will continue to lean on my informal supports of my: family  I will review resources and supports sent to me via Mail and consider outreaching and establishing with one or more which interest me.  I will continue to work on the application(s) for:  financial  assistance programs/grants as needed, desired and consider medical assistance and Social Security Disability.   I will use the clinic as a resource and I understand I can contact my clinic with 24/7 after hours services available.   I will continue to outreach to care coordination as needed for additional resources or supports.                                Patient/Caregiver understanding: Pt reports understanding and denies any additional questions or concerns at this times. SW CC engaged in AIDET communication during encounter.      Outreach Frequency: monthly  Future Appointments                In 2 months David Bustos MD [facility],             Plan: Pt to review resources sent via mail. Care Coordination to follow up again in 4 weeks.     Dhaval Roldan, [facility]  Clinic Care Coordinator  [facility]  [phone]  [email or web address]

## 2023-07-18 NOTE — LETTER
[facility] CARE COORDINATION  [address]  [place] [address]    August 11, 2023    Mary Carbajal  [facility] [address] [place] [facility] [address]      Dear Mary,        DONAL am a  clinic care coordinator who works with David Bustos MD with the [facility]. I wanted to thank you for spending the time to talk with me.  Below is a description of clinic care coordination and how I can further assist you.       The clinic care coordination team is made up of a registered nurse, , financial resource worker and community health worker who understand the health care system. The goal of clinic care coordination is to help you manage your health and improve access to the health care system. Our team works alongside your provider to assist you in determining your health and social needs. We can help you obtain health care and community resources, providing you with necessary information and education. We can work with you through any barriers and develop a care plan that helps coordinate and strengthen the communication between you and your care team.  Our services are voluntary and are offered without charge to you personally.    Please feel free to contact me with any questions or concerns regarding care coordination and what we can offer.      We are focused on providing you with the highest-quality healthcare experience possible.    Sincerely,     Dhaval Roldan, [facility]  Clinic Care Coordinator  [facility]  [phone]  [email or web address]    Enclosed: I have enclosed a copy of the Patient Centered Plan of Care. This has helpful information and goals that we have talked about. Please keep this in an easy to access place to use as needed.

## 2023-08-14 ENCOUNTER — OFFICE VISIT (OUTPATIENT)
Dept: URGENT CARE | Facility: URGENT CARE | Age: 63
End: 2023-08-14
Payer: COMMERCIAL

## 2023-08-14 VITALS
TEMPERATURE: 97.9 F | SYSTOLIC BLOOD PRESSURE: 110 MMHG | HEART RATE: 56 BPM | DIASTOLIC BLOOD PRESSURE: 80 MMHG | OXYGEN SATURATION: 98 % | WEIGHT: 127 LBS | BODY MASS INDEX: 22.5 KG/M2

## 2023-08-14 DIAGNOSIS — S50.12XA CONTUSION OF LEFT FOREARM, INITIAL ENCOUNTER: Primary | ICD-10-CM

## 2023-08-14 PROCEDURE — 99213 OFFICE O/P EST LOW 20 MIN: CPT | Performed by: PHYSICIAN ASSISTANT

## 2023-08-14 RX ORDER — IBUPROFEN 800 MG/1
800 TABLET, FILM COATED ORAL EVERY 8 HOURS PRN
Qty: 30 TABLET | Refills: 0 | Status: SHIPPED | OUTPATIENT
Start: 2023-08-14 | End: 2023-08-19

## 2023-08-14 NOTE — LETTER
REPORT OF WORK COMP    [facility]  [address]  [facility] [address]  [phone]      PATIENT DATA    Employee Name: Mary Carbajal      : 1960     #: [id number]    Work related injury: Yes  Employer at time of injury:   Employer contact & phone:   Employed elsewhere? No  Workers' Compensation Carrier/Managed Care Plan:      Today's date: 2023  Date of injury: 23  Date of first visit: 23    PROVIDER EVALUATION: Please fill in as needed.  Please give copy to employee for employer.    1. Diagnosis: left forearm contusion    2. Treatment: rest, ice, Ibuprofen.  3. Medication: Ibuprofen  NOTE: When ordering a medication, [place] Rules require [place] Comp or WC on prescriptions.    5. Return to work date: 23   ** WITH RESTRICTIONS? Yes, with work restrictions: * Restricted use of hand: Left - No use  DURATION OF LIMITATIONS: 23      RESTRICTIONS: Unlimited unless listed.  Restrictions apply to home and leisure also.  If work restrictions is not available, the employee is totally disabled.    Any Permanent Partial Disability? 0%    Medical Examiner: Aria Malave PA-C on 2023 at 12:06 PM              Next appointment: 4 days as needed     CC: Employer, Managed Care Plan/Payor, Patient

## 2023-08-14 NOTE — PROGRESS NOTES
URGENT CARE VISIT:    SUBJECTIVE:   Chief Complaint   Patient presents with    Urgent Care    Arm Injury     Pt was at work pushing a machine and left arm slipped and she hurt arm today.      Mary Carbajal is a 63 year old female who presents with a chief complaint of right arm pain.  Symptoms began today after her arm slipped on heavy machine.   Pain exacerbated by movement. Relieved by rest.  She treated it initially with no therapy. This is the first time this type of injury has occurred to this patient.     PMH:   Past Medical History:   Diagnosis Date    Anxiety 1/29/2020    CARDIOVASCULAR SCREENING; LDL GOAL LESS THAN 160 10/31/2010    Cervicalgia 9/14/2018    Chronic allergic rhinitis due to other allergic trigger, unspecified seasonality 9/14/2018    Constipation, unspecified constipation type 9/14/2018    Family history of diabetes mellitus 10/28/2013    GERD (gastroesophageal reflux disease) 3/17/2011    History of neck pain 1/19/2019    Hx of abdominal pain 1/19/2019    Insomnia, unspecified type 10/12/2018    Lateral epicondylitis of right elbow 7/3/2023    Lateral epicondylitis, unspecified laterality 7/3/2023    Medial epicondylitis of elbow, unspecified laterality 7/3/2023    Mild single current episode of major depressive disorder (H) 9/14/2018    Non-seasonal allergic rhinitis 9/14/2018    Palpitations 6/9/2022    Radial styloid tenosynovitis of left hand 11/9/2021    Skin macule 10/12/2018    Vaginal atrophy 10/8/2015     Allergies: Sinus & allergy [pseudoephedrine]   Medications:   Current Outpatient Medications   Medication Sig Dispense Refill    ibuprofen (ADVIL/MOTRIN) 800 MG tablet Take 1 tablet (800 mg) by mouth every 8 hours as needed for moderate pain 30 tablet 0    acetaminophen (TYLENOL) 500 MG tablet Take 500-1,000 mg by mouth every 6 hours as needed for mild pain      buPROPion (WELLBUTRIN XL) 150 MG 24 hr tablet TAKE 1 TABLET BY MOUTH EVERY MORNING 90 tablet 1    fluticasone  (FLONASE) 50 MCG/ACT nasal spray Spray 1 spray into both nostrils daily 16 g 11    ibuprofen (ADVIL/MOTRIN) 200 MG capsule Take 200 mg by mouth every 4 hours as needed for fever      lidocaine, viscous, (XYLOCAINE) 2 % solution Swish and spit 15 mLs in mouth every 3 hours as needed for pain ; Max 8 doses/24 hour period. 200 mL 0    meloxicam (MOBIC) 15 MG tablet Take 1 tablet (15 mg) by mouth daily 90 tablet 0     Social History:   Social History     Tobacco Use    Smoking status: Never    Smokeless tobacco: Never   Substance Use Topics    Alcohol use: No     Alcohol/week: 0.0 standard drinks of alcohol       ROS:  Review of systems negative except as stated above.    OBJECTIVE:  /80   Pulse 56   Temp 97.9  F (36.6  C) (Oral)   Wt 57.6 kg (127 lb)   LMP  (LMP Unknown)   SpO2 98%   BMI 22.50 kg/m    GENERAL APPEARANCE: healthy, alert and no distress  MUSCULOSKELETAL: mild TTP over left forearm generalized  EXTREMITIES: peripheral pulses normal  SKIN: a few faint areas of ecchymosis on left outer forearm  NEURO: sensation intact       ASSESSMENT:    ICD-10-CM    1. Contusion of left forearm, initial encounter  S50.12XA ibuprofen (ADVIL/MOTRIN) 800 MG tablet          PLAN:  Patient Instructions   Patient was educated on the natural course of injury.  Conservative measures discussed including rest, ice, compression, elevation, and over-the-counter analgesics (Tylenol or Ibuprofen) as needed. See your primary care provider if symptoms worsen or do not improve in 5 days. Seek emergency care if you develop severe pain/swelling, inability to move extremity, skin paleness, or weakness.     Patient verbalized understanding and is agreeable to plan. The patient was discharged ambulatory and in stable condition.    Aria Malave PA-C on 8/14/2023 at 12:49 PM

## 2023-08-14 NOTE — PATIENT INSTRUCTIONS
Patient was educated on the natural course of injury.  Conservative measures discussed including rest, ice, compression, elevation, and over-the-counter analgesics (Tylenol or Ibuprofen) as needed. See your primary care provider if symptoms worsen or do not improve in 5 days. Seek emergency care if you develop severe pain/swelling, inability to move extremity, skin paleness, or weakness.

## 2023-08-21 ENCOUNTER — PATIENT OUTREACH (OUTPATIENT)
Dept: CARE COORDINATION | Facility: CLINIC | Age: 63
End: 2023-08-21

## 2023-08-21 NOTE — PROGRESS NOTES
Clinic Care Coordination Contact  Community Health Worker Follow Up    Care Gaps:     There are no preventive care reminders to display for this patient.    Currently there are no Care Gaps.    Care Plan:   Care Plan: Financial Wellbeing       Problem: Patient expresses financial resource strain       Goal: Create an action plan to increase financial stability       Start Date: 7/18/2023 Expected End Date: 11/1/2023    This Visit's Progress: 0%    Note:     Goal Statement: I will continue to take steps to further support my financial wellbeing over the next 3 month(s).  Barriers: Pain  Strengths: Strong support from family.  Patient expressed understanding of goal: yes    Action steps to achieve this goal:  I will continue to lean on my informal supports of my: family  I will review resources and supports sent to me via Mail and consider outreaching and establishing with one or more which interest me.  I will continue to work on the application(s) for:  financial assistance programs/grants as needed, desired and consider medical assistance and Social Security Disability.   I will use the clinic as a resource and I understand I can contact my clinic with 24/7 after hours services available.   I will continue to outreach to care coordination as needed for additional resources or supports.                                Intervention and Education during outreach: Patients  states that they did not receive the resources sent by [facility] in the mail yet. Only sent a few days ago, will call back when they have received this.     CHW Plan: Next outreach in two weeks.     VI Matamoros, B.S. [facility]  Clinic Care Coordination  [facility]:  [place], [place] and [place]  [phone]  [email or web address]

## 2023-08-21 NOTE — PROGRESS NOTES
Clinic Care Coordination Contact  [facility]/Voicemail       Clinical Data: Care Coordinator Outreach  Outreach attempted x 1.  Left message on patient's and husbands voicemail with call back information and requested return call.    Plan: Care Coordinator will try to reach patient again in 10 business days.    Daiana Paz, VI, B.S. [facility] Care Coordination  [facility]:  [place], [place] and [place]  [phone]  [email or web address]

## 2023-08-29 ENCOUNTER — PATIENT OUTREACH (OUTPATIENT)
Dept: CARE COORDINATION | Facility: CLINIC | Age: 63
End: 2023-08-29

## 2023-08-29 ENCOUNTER — OFFICE VISIT (OUTPATIENT)
Dept: FAMILY MEDICINE | Facility: CLINIC | Age: 63
End: 2023-08-29
Payer: COMMERCIAL

## 2023-08-29 VITALS
WEIGHT: 132.6 LBS | DIASTOLIC BLOOD PRESSURE: 70 MMHG | RESPIRATION RATE: 11 BRPM | SYSTOLIC BLOOD PRESSURE: 132 MMHG | HEIGHT: 63 IN | BODY MASS INDEX: 23.5 KG/M2 | TEMPERATURE: 98 F | OXYGEN SATURATION: 97 % | HEART RATE: 69 BPM

## 2023-08-29 DIAGNOSIS — F32.0 MILD SINGLE CURRENT EPISODE OF MAJOR DEPRESSIVE DISORDER (H): ICD-10-CM

## 2023-08-29 DIAGNOSIS — K59.00 CONSTIPATION, UNSPECIFIED CONSTIPATION TYPE: ICD-10-CM

## 2023-08-29 DIAGNOSIS — M54.41 ACUTE MIDLINE LOW BACK PAIN WITH RIGHT-SIDED SCIATICA: ICD-10-CM

## 2023-08-29 DIAGNOSIS — S76.311A STRAIN OF RIGHT HAMSTRING, INITIAL ENCOUNTER: ICD-10-CM

## 2023-08-29 DIAGNOSIS — R41.3 MEMORY LOSS: ICD-10-CM

## 2023-08-29 DIAGNOSIS — G47.00 INSOMNIA, UNSPECIFIED TYPE: Primary | ICD-10-CM

## 2023-08-29 DIAGNOSIS — M25.50 ARTHRALGIA, UNSPECIFIED JOINT: ICD-10-CM

## 2023-08-29 DIAGNOSIS — Z59.9 FINANCIAL PROBLEMS: ICD-10-CM

## 2023-08-29 PROBLEM — Z23 COVID-19 VACCINE ADMINISTERED: Status: RESOLVED | Noted: 2021-11-09 | Resolved: 2023-08-29

## 2023-08-29 PROBLEM — Z23 FLU VACCINE NEED: Status: RESOLVED | Noted: 2021-11-09 | Resolved: 2023-08-29

## 2023-08-29 PROBLEM — H69.91 DYSFUNCTION OF RIGHT EUSTACHIAN TUBE: Status: RESOLVED | Noted: 2020-01-29 | Resolved: 2023-08-29

## 2023-08-29 PROBLEM — Z23 ENCOUNTER FOR IMMUNIZATION: Status: RESOLVED | Noted: 2022-06-09 | Resolved: 2023-08-29

## 2023-08-29 PROBLEM — Z00.00 ENCOUNTER FOR PREVENTIVE HEALTH EXAMINATION: Status: RESOLVED | Noted: 2022-06-09 | Resolved: 2023-08-29

## 2023-08-29 PROCEDURE — 99214 OFFICE O/P EST MOD 30 MIN: CPT | Performed by: FAMILY MEDICINE

## 2023-08-29 RX ORDER — NORTRIPTYLINE HCL 10 MG
10 CAPSULE ORAL AT BEDTIME
Qty: 30 CAPSULE | Refills: 3 | Status: SHIPPED | OUTPATIENT
Start: 2023-08-29 | End: 2023-09-26

## 2023-08-29 RX ORDER — MELOXICAM 15 MG/1
15 TABLET ORAL DAILY
Qty: 30 TABLET | Refills: 3 | Status: SHIPPED | OUTPATIENT
Start: 2023-08-29 | End: 2023-09-26

## 2023-08-29 RX ORDER — BUPROPION HYDROCHLORIDE 300 MG/1
300 TABLET ORAL EVERY MORNING
Qty: 30 TABLET | Refills: 5 | Status: SHIPPED | OUTPATIENT
Start: 2023-08-29 | End: 2023-09-26

## 2023-08-29 SDOH — ECONOMIC STABILITY - INCOME SECURITY: PROBLEM RELATED TO HOUSING AND ECONOMIC CIRCUMSTANCES, UNSPECIFIED: Z59.9

## 2023-08-29 NOTE — PROGRESS NOTES
Clinic Care Coordination Contact  Care Coordination Clinician Chart Review    Situation: Patient chart reviewed by Care Coordinator.       Background: Care Coordination Program started: 7/3/2023. Initial assessment completed and patient-centered care plan(s) were developed with participation from patient. Lead CC handed patient off to CHW for continued outreaches.       Assessment: Per chart review, patient outreach completed by CC CHW on 8/21/2023.  Patient is actively working to accomplish goal(s). Patient's goal(s) appropriate and relevant at this time. Patient is not due for updated Plan of Care.  Assessments will be completed annually or as needed/with change of patient status.      Care Plan: Financial Wellbeing       Problem: Patient expresses financial resource strain       Goal: Create an action plan to increase financial stability       Start Date: 7/18/2023 Expected End Date: 11/1/2023    This Visit's Progress: 0%    Note:     Goal Statement: I will continue to take steps to further support my financial wellbeing over the next 3 month(s).  Barriers: Pain  Strengths: Strong support from family.  Patient expressed understanding of goal: yes    Action steps to achieve this goal:  I will continue to lean on my informal supports of my: family  I will review resources and supports sent to me via Mail and consider outreaching and establishing with one or more which interest me.  I will continue to work on the application(s) for:  financial assistance programs/grants as needed, desired and consider medical assistance and Social Security Disability.   I will use the clinic as a resource and I understand I can contact my clinic with 24/7 after hours services available.   I will continue to outreach to care coordination as needed for additional resources or supports.                                   Plan/Recommendations: The patient will continue working with Care Coordination to achieve goal(s) as above. CHW will  continue outreaches at minimum every 30 days and will involve Lead CC as needed or if patient is ready to move to Maintenance. Lead CC will continue to monitor CHW outreaches and patient's progress to goal(s) every 6 weeks.     Plan of Care updated and sent to patient: Alexandra Roldan [facility]  Clinic Care Coordinator  [facility]  [phone]  [email or web address]

## 2023-08-29 NOTE — PROGRESS NOTES
Assessment & Plan   Problem List Items Addressed This Visit       Acute midline low back pain with right-sided sciatica     Patient with numerous musculoskeletal complaints.  Cannot afford PT.  She has previously reported and again reiterates that another provider recommended she take 6 months off of work.  I would not recommend this she did have response to nonsteroidals.  Continue         Relevant Medications    nortriptyline (PAMELOR) 10 MG capsule    meloxicam (MOBIC) 15 MG tablet    buPROPion (WELLBUTRIN XL) 300 MG 24 hr tablet    Arthralgia, unspecified joint     Multiple musculoskeletal complaints.  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  She reports that light duty without heavy machinery but still involves lifting and carrying.  Objective data have revealed some arthritis in the imaged joints.  Serologic evaluation for connective tissue disease reassuring.  Objective findings so far are inadequate for disability.  NSAID, continue SNRI, add low-dose tricyclic         Relevant Medications    nortriptyline (PAMELOR) 10 MG capsule    meloxicam (MOBIC) 15 MG tablet    Constipation, unspecified constipation type     She complains of abdominal pain in spite of SNRI.  For irritable bowel.  Database adequate.  Abdomen benign today.  Add Linzess         Relevant Medications    buPROPion (WELLBUTRIN XL) 300 MG 24 hr tablet    linaclotide (LINZESS) 145 MCG capsule    Financial problems     Patient had hoped to work until 67.  However, it is clear that she feels she cannot continue in her current position.  Advised to seek time off work and pursue disability by outside provider, I cannot see that supported by objective data.  We will ask care coordination to discuss with patient options for financial support and snf.  I advised her to look into her current Social Security benefits.  Seeking Social Security in the near future might allow her to change jobs to  something more tenable         Relevant Orders    Primary Care - Care Coordination Referral    Insomnia, unspecified type - Primary     Patient reports that she has trouble sleeping.  She must rise at 330 to go to work goes to sleep at 7 or 8 does not fall asleep until 1 or 2.  She is exhausted.  We will add low-dose tricyclic for hypnotic and pain relief affect.         Relevant Medications    nortriptyline (PAMELOR) 10 MG capsule    Memory loss     Remote complaint no further episodes.  Serologic evaluation reassuring.  Continue to monitor         Relevant Medications    buPROPion (WELLBUTRIN XL) 300 MG 24 hr tablet    Mild single current episode of major depressive disorder (H)     SNRI tolerated, benefit incomplete.  Financial/employment considerations complicate response         Relevant Medications    nortriptyline (PAMELOR) 10 MG capsule    buPROPion (WELLBUTRIN XL) 300 MG 24 hr tablet    Strain of right hamstring     Patient reports that she has pain in her right hamstring.  Discomfort to palpation in the proximal hamstring without objective findings.  X-ray of right hip normal.  NSAID.  PT not available due to financial constraints         Relevant Medications    meloxicam (MOBIC) 15 MG tablet        Earlier, patient reported that her work made her cry.  That has been reduced             David Bustos MD  [facility]    Paulo Hernandez is a 63 year old, presenting for the following health issues:  Constipation        8/29/2023     2:38 PM   Additional Questions   Roomed by Maria E CAPONE     Constipation  Onset/Duration: 3 weeks ago  Description:  Frequency of bowel movements: on a daily basis  Progression of Symptoms: intermittent  Accompanying signs and symptoms:    Abdominal pain: YES   Rectal pain: YES   Blood in stool: No   Nausea/Vomiting: No   Weight loss or gain: YES- gain 4 pounds  History:   Similar problems in past: YES  History of abdominal surgery:  "No  New medications: No  Precipitating or alleviating factors:   Therapies tried and outcome: hemorrhoid ointment with little relief        Review of Systems   As described      Objective    /70 (BP Location: Right arm, Patient Position: Sitting, Cuff Size: Adult Regular)   Pulse 69   Temp 98  F (36.7  C) (Oral)   Resp 11   Ht 1.6 m (5' 3\")   Wt 60.1 kg (132 lb 9.6 oz)   LMP  (LMP Unknown)   SpO2 97%   BMI 23.49 kg/m    Body mass index is 23.49 kg/m .  Physical Exam     ABDOMEN without organomegaly nor tenderness to palpation  Hamstring as described    David Bustos MD                      "

## 2023-08-30 ENCOUNTER — TELEPHONE (OUTPATIENT)
Dept: FAMILY MEDICINE | Facility: CLINIC | Age: 63
End: 2023-08-30
Payer: COMMERCIAL

## 2023-08-30 DIAGNOSIS — K59.04 CHRONIC IDIOPATHIC CONSTIPATION: Primary | ICD-10-CM

## 2023-08-30 DIAGNOSIS — K59.00 CONSTIPATION, UNSPECIFIED CONSTIPATION TYPE: ICD-10-CM

## 2023-08-30 NOTE — ASSESSMENT & PLAN NOTE
Patient had hoped to work until 67.  However, it is clear that she feels she cannot continue in her current position.  Advised to seek time off work and pursue disability by outside provider, I cannot see that supported by objective data.  We will ask care coordination to discuss with patient options for financial support and intermediate.  I advised her to look into her current Social Security benefits.  Seeking Social Security in the near future might allow her to change jobs to something more tenable

## 2023-08-30 NOTE — ASSESSMENT & PLAN NOTE
Patient reports that she has pain in her right hamstring.  Discomfort to palpation in the proximal hamstring without objective findings.  X-ray of right hip normal.  NSAID.  PT not available due to financial constraints

## 2023-08-30 NOTE — TELEPHONE ENCOUNTER
PRIOR AUTHORIZATION DENIED    Medication: LINACLOTIDE 145 MCG PO CAPS  Insurance Company: [employer] - Phone [phone] Fax [phone]  Denial Date: 8/29/2023  Denial Rational:     Appeal Information:     Patient Notified: No

## 2023-08-30 NOTE — ASSESSMENT & PLAN NOTE
Patient reports that she has trouble sleeping.  She must rise at 330 to go to work goes to sleep at 7 or 8 does not fall asleep until 1 or 2.  She is exhausted.  We will add low-dose tricyclic for hypnotic and pain relief affect.

## 2023-08-30 NOTE — ASSESSMENT & PLAN NOTE
Patient with numerous musculoskeletal complaints.  Cannot afford PT.  She has previously reported and again reiterates that another provider recommended she take 6 months off of work.  I would not recommend this she did have response to nonsteroidals.  Continue

## 2023-08-30 NOTE — ASSESSMENT & PLAN NOTE
Multiple musculoskeletal complaints.  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  She reports that light duty without heavy machinery but still involves lifting and carrying.  Objective data have revealed some arthritis in the imaged joints.  Serologic evaluation for connective tissue disease reassuring.  Objective findings so far are inadequate for disability.  NSAID, continue SNRI, add low-dose tricyclic

## 2023-08-30 NOTE — ASSESSMENT & PLAN NOTE
She complains of abdominal pain in spite of SNRI.  For irritable bowel.  Database adequate.  Abdomen benign today.  Add Linzess

## 2023-09-07 ENCOUNTER — PATIENT OUTREACH (OUTPATIENT)
Dept: CARE COORDINATION | Facility: CLINIC | Age: 63
End: 2023-09-07
Payer: COMMERCIAL

## 2023-09-07 NOTE — PROGRESS NOTES
Clinic Care Coordination Contact  Community Health Worker Follow Up    Care Gaps:     Health Maintenance Due   Topic Date Due    INFLUENZA VACCINE (1) 09/01/2023       Care Plan:   Care Plan: Financial Wellbeing       Problem: Patient expresses financial resource strain       Goal: Create an action plan to increase financial stability       Start Date: 7/18/2023 Expected End Date: 11/1/2023    This Visit's Progress: 20% Recent Progress: 0%    Note:     Goal Statement: I will continue to take steps to further support my financial wellbeing over the next 3 month(s).  Barriers: Pain  Strengths: Strong support from family.  Patient expressed understanding of goal: yes    Action steps to achieve this goal:  I will continue to lean on my informal supports of my: family  I will review resources and supports sent to me via Mail and consider outreaching and establishing with one or more which interest me.  I will continue to work on the application(s) for:  financial assistance programs/grants as needed, desired and consider medical assistance and Social Security Disability.   I will use the clinic as a resource and I understand I can contact my clinic with 24/7 after hours services available.   I will continue to outreach to care coordination as needed for additional resources or supports.                                Intervention and Education during outreach: Spoke with patients , Sebastian. He states that they talked to her provider about what to do, he gave them ssa.gov. CHW explained resources JONATHAN  mailed, they did receive them. Wondering where an application is.   CHW encouraged them to work with Disability Specialists: [phone] who can provide an  and go through the application with her over the phone or mail. They would be able to discuss what the best option would be.   Patient is still working, she works for a cleaning company. Her employer was trying to find another position but they all  involve heavy lifting. Unable to retire this soon, most interested in social security disability.    CHW Plan: Next outreach in one month.     Daiana Paz, VI, B.S. [facility]  Clinic Care Coordination  [facility] Clinics:  [place], [place] and [place]  [phone]  [email or web address]

## 2023-09-21 ENCOUNTER — PATIENT OUTREACH (OUTPATIENT)
Dept: CARE COORDINATION | Facility: CLINIC | Age: 63
End: 2023-09-21
Payer: COMMERCIAL

## 2023-09-21 DIAGNOSIS — M25.50 ARTHRALGIA, UNSPECIFIED JOINT: ICD-10-CM

## 2023-09-21 DIAGNOSIS — G47.00 INSOMNIA, UNSPECIFIED TYPE: ICD-10-CM

## 2023-09-21 RX ORDER — NORTRIPTYLINE HCL 10 MG
10 CAPSULE ORAL AT BEDTIME
Qty: 30 CAPSULE | Refills: 3 | OUTPATIENT
Start: 2023-09-21

## 2023-09-26 ENCOUNTER — OFFICE VISIT (OUTPATIENT)
Dept: FAMILY MEDICINE | Facility: CLINIC | Age: 63
End: 2023-09-26
Payer: COMMERCIAL

## 2023-09-26 VITALS
BODY MASS INDEX: 23.39 KG/M2 | HEIGHT: 63 IN | SYSTOLIC BLOOD PRESSURE: 132 MMHG | WEIGHT: 132 LBS | TEMPERATURE: 98.1 F | HEART RATE: 75 BPM | RESPIRATION RATE: 16 BRPM | DIASTOLIC BLOOD PRESSURE: 76 MMHG

## 2023-09-26 DIAGNOSIS — M25.50 ARTHRALGIA, UNSPECIFIED JOINT: ICD-10-CM

## 2023-09-26 DIAGNOSIS — G47.00 INSOMNIA, UNSPECIFIED TYPE: ICD-10-CM

## 2023-09-26 DIAGNOSIS — Z23 NEED FOR PROPHYLACTIC VACCINATION AND INOCULATION AGAINST INFLUENZA: ICD-10-CM

## 2023-09-26 DIAGNOSIS — M54.41 ACUTE MIDLINE LOW BACK PAIN WITH RIGHT-SIDED SCIATICA: ICD-10-CM

## 2023-09-26 DIAGNOSIS — Z12.31 VISIT FOR SCREENING MAMMOGRAM: Primary | ICD-10-CM

## 2023-09-26 DIAGNOSIS — K59.04 CHRONIC IDIOPATHIC CONSTIPATION: ICD-10-CM

## 2023-09-26 DIAGNOSIS — Z59.9 FINANCIAL PROBLEMS: ICD-10-CM

## 2023-09-26 DIAGNOSIS — R41.3 MEMORY LOSS: ICD-10-CM

## 2023-09-26 DIAGNOSIS — K59.00 CONSTIPATION, UNSPECIFIED CONSTIPATION TYPE: ICD-10-CM

## 2023-09-26 PROCEDURE — 90682 RIV4 VACC RECOMBINANT DNA IM: CPT | Performed by: FAMILY MEDICINE

## 2023-09-26 PROCEDURE — 90471 IMMUNIZATION ADMIN: CPT | Performed by: FAMILY MEDICINE

## 2023-09-26 PROCEDURE — 99214 OFFICE O/P EST MOD 30 MIN: CPT | Mod: 25 | Performed by: FAMILY MEDICINE

## 2023-09-26 RX ORDER — BUPROPION HYDROCHLORIDE 300 MG/1
300 TABLET ORAL EVERY MORNING
Qty: 90 TABLET | Refills: 1 | Status: SHIPPED | OUTPATIENT
Start: 2023-09-26

## 2023-09-26 RX ORDER — NORTRIPTYLINE HCL 10 MG
10 CAPSULE ORAL AT BEDTIME
Qty: 90 CAPSULE | Refills: 3 | Status: SHIPPED | OUTPATIENT
Start: 2023-09-26

## 2023-09-26 RX ORDER — MELOXICAM 15 MG/1
15 TABLET ORAL DAILY
Qty: 90 TABLET | Refills: 1 | Status: SHIPPED | OUTPATIENT
Start: 2023-09-26

## 2023-09-26 SDOH — ECONOMIC STABILITY - INCOME SECURITY: PROBLEM RELATED TO HOUSING AND ECONOMIC CIRCUMSTANCES, UNSPECIFIED: Z59.9

## 2023-09-26 NOTE — PROGRESS NOTES
Prior to immunization administration, verified patients identity using patient s name and date of birth. Please see Immunization Activity for additional information.     Screening Questionnaire for Adult Immunization    Are you sick today?   No   Do you have allergies to medications, food, a vaccine component or latex?   No   Have you ever had a serious reaction after receiving a vaccination?   No   Do you have a long-term health problem with heart, lung, kidney, or metabolic disease (e.g., diabetes), asthma, a blood disorder, no spleen, complement component deficiency, a cochlear implant, or a spinal fluid leak?  Are you on long-term aspirin therapy?   No   Do you have cancer, leukemia, HIV/AIDS, or any other immune system problem?   No   Do you have a parent, brother, or sister with an immune system problem?   No   In the past 3 months, have you taken medications that affect  your immune system, such as prednisone, other steroids, or anticancer drugs; drugs for the treatment of rheumatoid arthritis, Crohn s disease, or psoriasis; or have you had radiation treatments?   No   Have you had a seizure, or a brain or other nervous system problem?   No   During the past year, have you received a transfusion of blood or blood    products, or been given immune (gamma) globulin or antiviral drug?   No   For women: Are you pregnant or is there a chance you could become       pregnant during the next month?   No   Have you received any vaccinations in the past 4 weeks?   No     Immunization questionnaire answers were all negative.      Patient instructed to remain in clinic for 15 minutes afterwards, and to report any adverse reactions.     Screening performed by Brenda Parmar CMA on 9/26/2023 at 3:00 PM.

## 2023-09-26 NOTE — PROGRESS NOTES
Assessment & Plan   Problem List Items Addressed This Visit       Acute midline low back pain with right-sided sciatica    Relevant Medications    buPROPion (WELLBUTRIN XL) 300 MG 24 hr tablet    meloxicam (MOBIC) 15 MG tablet    nortriptyline (PAMELOR) 10 MG capsule    Arthralgia, unspecified joint     Current regimen has reduced her discomfort and she is able to work with some but acceptable pain.  We will plan on continuing NSAID at this dose for a few more months.  Other agents will continue.  Consider 1 year therapy post her [facility]         Relevant Medications    meloxicam (MOBIC) 15 MG tablet    nortriptyline (PAMELOR) 10 MG capsule    Chronic idiopathic constipation     Responsive to current therapies.  Continue         Relevant Medications    buPROPion (WELLBUTRIN XL) 300 MG 24 hr tablet    linaclotide (LINZESS) 145 MCG capsule    Financial problems     She is determined that she is eligible for Social Security in the near future.  She is overjoyed         Insomnia, unspecified type     She is sleeping much better.  Continue         Relevant Medications    nortriptyline (PAMELOR) 10 MG capsule    Memory loss    Relevant Medications    buPROPion (WELLBUTRIN XL) 300 MG 24 hr tablet    Need for prophylactic vaccination and inoculation against influenza     Offered         Visit for screening mammogram - Primary     She is due         Relevant Orders    MA SCREENING DIGITAL BILAT - Future  (s+30)     Other Visit Diagnoses       Constipation, unspecified constipation type        Relevant Medications    buPROPion (WELLBUTRIN XL) 300 MG 24 hr tablet                          David Bustos MD  [facility]    Paulo Hernandez is a 63 year old, presenting for the following health issues:  Follow Up        9/26/2023     2:13 PM   Additional Questions   Roomed by Brenda CAPONE       Concern - Follow up, sleep is getting better, arthritidis is in control by the medication     Pain  "History:  When did you first notice your pain?  A few years    Have you seen this provider for your pain in the past? Yes   Where in your body do you have pain? Both arms   Are you seeing anyone else for your pain? No        11/9/2021     5:19 PM 11/16/2022     1:54 PM 7/3/2023     2:55 PM   PHQ-9 SCORE   PHQ-9 Total Score MyChart 6 (Mild depression) 0    PHQ-9 Total Score 6 0 5                 Chronic Pain Follow Up:    Location of pain:   Analgesia/pain control:    - Recent changes:      - Overall control:     - Current treatments:    Adherence:     - Do you ever take more pain medicine than prescribed?     - When did you take your last dose of pain medicine?     Adverse effects:    PDMP Review       None          Last CSA Agreement:   CSA -- Patient Level:    CSA: None found at the patient level.       Last UDS:           How many servings of fruits and vegetables do you eat daily?    On average, how many sweetened beverages do you drink each day (Examples: soda, juice, sweet tea, etc.  Do NOT count diet or artificially sweetened beverages)?     How many days per week do you exercise enough to make your heart beat faster?   How many minutes a day do you exercise enough to make your heart beat faster?   How many days per week do you miss taking your medication?              Review of Systems   As described      Objective    /76 (BP Location: Right arm, Patient Position: Sitting, Cuff Size: Adult Regular)   Pulse 75   Temp 98.1  F (36.7  C) (Oral)   Resp 16   Ht 1.6 m (5' 3\")   Wt 59.9 kg (132 lb)   LMP  (LMP Unknown)   BMI 23.38 kg/m    Body mass index is 23.38 kg/m .  Physical Exam     She is ebullient  No synovitis  David Bustos MD                      "

## 2023-09-26 NOTE — ASSESSMENT & PLAN NOTE
Current regimen has reduced her discomfort and she is able to work with some but acceptable pain.  We will plan on continuing NSAID at this dose for a few more months.  Other agents will continue.  Consider 1 year therapy post her intermediate

## 2023-09-29 ENCOUNTER — APPOINTMENT (OUTPATIENT)
Dept: INTERPRETER SERVICES | Facility: CLINIC | Age: 63
End: 2023-09-29
Payer: COMMERCIAL

## 2023-10-09 ENCOUNTER — PATIENT OUTREACH (OUTPATIENT)
Dept: CARE COORDINATION | Facility: CLINIC | Age: 63
End: 2023-10-09
Payer: COMMERCIAL

## 2023-10-09 NOTE — PROGRESS NOTES
Clinic Care Coordination Contact  Community Health Worker Follow Up    Care Gaps:     Health Maintenance Due   Topic Date Due    COVID-19 Vaccine (6 - 2023-24 season) 09/01/2023    MAMMO SCREENING  10/21/2023     Scheduled for 10/20/23.    Care Plan:   Care Plan: Financial Wellbeing Completed 10/9/2023      Problem: Patient expresses financial resource strain  Resolved 10/9/2023      Goal: Create an action plan to increase financial stability  Completed 10/9/2023      Start Date: 7/18/2023 Expected End Date: 11/1/2023    This Visit's Progress: 100% Recent Progress: 20%    Note:     Goal Statement: I will continue to take steps to further support my financial wellbeing over the next 3 month(s).  Barriers: Pain  Strengths: Strong support from family.  Patient expressed understanding of goal: yes    Action steps to achieve this goal:  I will continue to lean on my informal supports of my: family  I will review resources and supports sent to me via Mail and consider outreaching and establishing with one or more which interest me.  I will continue to work on the application(s) for:  financial assistance programs/grants as needed, desired and consider medical assistance and Social Security Disability.   I will use the clinic as a resource and I understand I can contact my clinic with 24/7 after hours services available.   I will continue to outreach to care coordination as needed for additional resources or supports.                                Intervention and Education during outreach: Spoke with patients , Sebastian. Patient was speaking as well, they have found out that in November she will be able to retire from her job and be eligible for social security. They would not like to pursue applying for social security disability at this time. Sebastian states that everything else is going well, he will reach out to CHW if needed.     CHW Plan: Routing to lead CC to review for maintenance, if accepted next outreach will  be in two months.     VI Matamoros, B.S. [facility]  Clinic Care Coordination  [facility]:  [place], [place] and [place]  [phone]  [email or web address]

## 2023-10-10 NOTE — PROGRESS NOTES
Care Coordination Goal Maintenance Review:     CHW documentation reviewed. Patient is appropriate to transition to Maintenance with Care Coordination. CHW will outreach in two months to discuss Graduation.  CC will remain available for CC needs    JUAN Hazel  Clinic Care Coordinator  [facility]  [phone]  [email or web address]

## 2023-10-17 ENCOUNTER — PATIENT OUTREACH (OUTPATIENT)
Dept: CARE COORDINATION | Facility: CLINIC | Age: 63
End: 2023-10-17

## 2023-10-20 ENCOUNTER — ANCILLARY PROCEDURE (OUTPATIENT)
Dept: MAMMOGRAPHY | Facility: CLINIC | Age: 63
End: 2023-10-20
Attending: FAMILY MEDICINE
Payer: COMMERCIAL

## 2023-10-20 DIAGNOSIS — Z12.31 VISIT FOR SCREENING MAMMOGRAM: ICD-10-CM

## 2023-10-20 PROCEDURE — 77067 SCR MAMMO BI INCL CAD: CPT | Mod: TC | Performed by: RADIOLOGY

## 2023-10-20 PROCEDURE — 77063 BREAST TOMOSYNTHESIS BI: CPT | Mod: TC | Performed by: RADIOLOGY

## 2023-10-31 ENCOUNTER — PATIENT OUTREACH (OUTPATIENT)
Dept: CARE COORDINATION | Facility: CLINIC | Age: 63
End: 2023-10-31
Payer: COMMERCIAL

## 2023-12-14 ENCOUNTER — PATIENT OUTREACH (OUTPATIENT)
Dept: CARE COORDINATION | Facility: CLINIC | Age: 63
End: 2023-12-14
Payer: COMMERCIAL

## 2023-12-14 NOTE — PROGRESS NOTES
Clinic Care Coordination Contact    Assessment: Care Coordinator contacted patient for 2 month follow up.  Patient has continued to follow the plan of care and assessment is negative for any new needs or concerns.    Enrollment status: Graduated.      Plan: No further outreaches at this time.  Patient will continue to follow the plan of care.  If new needs arise a new Care Coordination referral may be placed.      Dhaval Roldan [facility]  Clinic Care Coordinator  [facility]  [phone]  [email or web address]

## 2023-12-14 NOTE — LETTER
[facility] CARE COORDINATION  [address] [facility] [address]    December 14, 2023    Mary Carbajal  [address] [facility] [place] [address]    Dear Mary,  Your Care Team congratulates you on your journey to maintain wellness. This document will help guide you on your journey to maintain a healthy lifestyle.  You can use this to help you overcome any barriers you may encounter.  If you should have any questions or concerns, you can contact the members of your Care Team or contact your Primary Care Clinic for assistance.     Health Maintenance  Health Maintenance Reviewed:      My Access Plan  Medical Emergency 911   Primary Clinic Line [facility] - [phone]   24 Hour Appointment Line [phone] or  [phone]) (toll-free)   24 Hour Nurse Line [phone] (toll-free)   Preferred Urgent Care     Preferred Hospital     Preferred Pharmacy [id number] [place], [place] - [address]     Behavioral Health Crisis Line The National Suicide Prevention Lifeline at 1-370.298.6101 or 911     My Care Team Members  Patient Care Team         Relationship Specialty Notifications Start End    David Bustos MD PCP - General Family Practice  8/15/17     Phone: [phone] Fax: [phone] [address] [facility] [address]    Daivd Bustos MD Assigned PCP   11/21/21     Phone: [phone] Fax: [phone] [address] [facility] [address]    Rubi Olmos RN Personal Advocate & Liaison (PAL) Family Medicine Admissions 7/5/23     [phone]    Dhaval Roldan LSW Lead Care Coordinator Primary Care - CC Admissions 7/17/23 12/14/23    Phone: [phone]         Daiana Paz CHW Community Health Worker Primary Care - CC Admissions 7/18/23 12/14/23    Phone: [phone]                      Goals    None            It has been your Clinic Care Team's pleasure to work with you on your goals.    Regards,  Your Clinic  Care Team

## 2023-12-14 NOTE — PROGRESS NOTES
Clinic Care Coordination Contact    Assessment: Care Coordinator contacted patient for 2 month follow up.  Patient has continued to follow the plan of care and assessment is negative for any new needs or concerns.  Sebastian states that they have filled out paperwork, patient will be starting [employer] in Feb/March. Their son has autism and they are also working on legal guardianship paperwork for that. He declines needing further resources or support from CC.    Enrollment status: Graduation pending review from lead CC.     Plan: No further outreaches at this time. Routing to [facility] to review for graduation.    Daiana Paz, VI, B.S. [facility]  Clinic Care Coordination  [facility]:  [place], [place] and [place]  [phone]  [email or web address]

## 2024-10-07 ENCOUNTER — OFFICE VISIT (OUTPATIENT)
Dept: FAMILY MEDICINE | Facility: CLINIC | Age: 64
End: 2024-10-07
Payer: COMMERCIAL

## 2024-10-07 VITALS
WEIGHT: 137.9 LBS | SYSTOLIC BLOOD PRESSURE: 136 MMHG | BODY MASS INDEX: 24.43 KG/M2 | DIASTOLIC BLOOD PRESSURE: 75 MMHG | TEMPERATURE: 98 F | OXYGEN SATURATION: 99 % | RESPIRATION RATE: 14 BRPM | HEART RATE: 68 BPM | HEIGHT: 63 IN

## 2024-10-07 DIAGNOSIS — G47.00 INSOMNIA, UNSPECIFIED TYPE: ICD-10-CM

## 2024-10-07 DIAGNOSIS — M25.50 ARTHRALGIA, UNSPECIFIED JOINT: ICD-10-CM

## 2024-10-07 DIAGNOSIS — G62.9 NEUROPATHY: Primary | ICD-10-CM

## 2024-10-07 DIAGNOSIS — Z23 ENCOUNTER FOR IMMUNIZATION: ICD-10-CM

## 2024-10-07 PROCEDURE — 90673 RIV3 VACCINE NO PRESERV IM: CPT

## 2024-10-07 PROCEDURE — 99213 OFFICE O/P EST LOW 20 MIN: CPT | Mod: 25

## 2024-10-07 PROCEDURE — 90471 IMMUNIZATION ADMIN: CPT

## 2024-10-07 RX ORDER — NORTRIPTYLINE HYDROCHLORIDE 10 MG/1
10 CAPSULE ORAL AT BEDTIME
Qty: 90 CAPSULE | Refills: 0 | Status: SHIPPED | OUTPATIENT
Start: 2024-10-07

## 2024-10-07 RX ORDER — MELOXICAM 15 MG/1
15 TABLET ORAL DAILY
Qty: 90 TABLET | Refills: 1 | Status: CANCELLED | OUTPATIENT
Start: 2024-10-07

## 2024-10-07 RX ORDER — MELOXICAM 15 MG/1
15 TABLET ORAL DAILY PRN
Qty: 90 TABLET | Refills: 0 | Status: SHIPPED | OUTPATIENT
Start: 2024-10-07

## 2024-10-07 ASSESSMENT — ANXIETY QUESTIONNAIRES
6. BECOMING EASILY ANNOYED OR IRRITABLE: NOT AT ALL
1. FEELING NERVOUS, ANXIOUS, OR ON EDGE: NOT AT ALL
5. BEING SO RESTLESS THAT IT IS HARD TO SIT STILL: NOT AT ALL
GAD7 TOTAL SCORE: 2
7. FEELING AFRAID AS IF SOMETHING AWFUL MIGHT HAPPEN: NOT AT ALL
3. WORRYING TOO MUCH ABOUT DIFFERENT THINGS: SEVERAL DAYS
GAD7 TOTAL SCORE: 2
2. NOT BEING ABLE TO STOP OR CONTROL WORRYING: SEVERAL DAYS
IF YOU CHECKED OFF ANY PROBLEMS ON THIS QUESTIONNAIRE, HOW DIFFICULT HAVE THESE PROBLEMS MADE IT FOR YOU TO DO YOUR WORK, TAKE CARE OF THINGS AT HOME, OR GET ALONG WITH OTHER PEOPLE: NOT DIFFICULT AT ALL

## 2024-10-07 ASSESSMENT — PATIENT HEALTH QUESTIONNAIRE - PHQ9
5. POOR APPETITE OR OVEREATING: NOT AT ALL
SUM OF ALL RESPONSES TO PHQ QUESTIONS 1-9: 5

## 2024-10-07 NOTE — PROGRESS NOTES
Assessment & Plan     (G62.9) Neuropathy  (primary encounter diagnosis)  Comment: suspect patient discomfort may be from neuropathy. Patient was on nortriptyline in past and tolerated, has not been taking for some time now. Shared decision to restart today to help combat neuropathic discomfort. Discussed medication risks and benefits of Nortriptyline with patient in detail with patient verbal understanding. We did discuss potential EMG, however will hold for now. Discussed w/ patient to follow up if not noting any improvement, can increase dose at that time if necessary. Otherwise follow up at upcoming visit w/ Enedina Metz CNP to discuss. Patient fully understands and is agreeable with plan of care, at this point patient will follow up as needed unless acute concerns arise in the meantime.  Plan: nortriptyline (PAMELOR) 10 MG capsule    (G47.00) Insomnia, unspecified type  Comment: nortriptyline may help w/ this if pain subsides.   Plan: nortriptyline (PAMELOR) 10 MG capsule    (M25.50) Arthralgia, unspecified joint  Comment: mild, mostly in wrists. Uses Mobic sparingly. Will refill for patient. Most recent CMP stable. Update kidney function status at upcoming visit.   Plan: meloxicam (MOBIC) 15 MG tablet    (Z23) Encounter for immunization  Comment: updated today.   Plan: INFLUENZA VACCINE TRIVALENT(FLUBLOK)       The longitudinal plan of care for the diagnosis(es)/condition(s) as documented were addressed during this visit. Due to the added complexity in care, I will continue to support Mary in the subsequent management and with ongoing continuity of care.    Paulo Hernandez is a 64 year old, presenting for the following health issues:  Musculoskeletal Problem (Foot pain, hot/cold sensation top of foot to ankle.)        10/7/2024     3:35 PM   Additional Questions   Roomed by Tricia Madison. EMT-B     Musculoskeletal Problem       Pain History:  When did you first notice your pain? 3 months   Have you  "seen anyone else for your pain? No  How has your pain affected your ability to work? retired  Where in your body do you have pain?  feet  Pt reports having a hot and cold sensation on the top of both feet that goes up to the ankles. There is no visible rash and reports the pain level can be up to 8/10 at times. Sh states that she cannot sleep. Pt states this starts out as a burning sensation then turns hot then cold.     Review of Systems  Constitutional, musculoskeletal, neuro, psych systems are negative, except as otherwise noted.      Objective    /75 (BP Location: Right arm, Patient Position: Sitting, Cuff Size: Adult Regular)   Pulse 68   Temp 98  F (36.7  C) (Oral)   Resp 14   Ht 1.6 m (5' 3\")   Wt 62.6 kg (137 lb 14.4 oz)   LMP  (LMP Unknown)   SpO2 99%   BMI 24.43 kg/m    Body mass index is 24.43 kg/m .  Physical Exam  Vitals and nursing note reviewed.   Constitutional:       Appearance: Normal appearance. She is well-developed. She is not ill-appearing or toxic-appearing.   Cardiovascular:      Rate and Rhythm: Normal rate.   Pulmonary:      Effort: Pulmonary effort is normal.   Musculoskeletal:        Feet:    Feet:      Comments: Burning pain noted at highlighted areas.   Neurological:      Mental Status: She is alert.   Psychiatric:         Attention and Perception: Attention and perception normal.         Mood and Affect: Mood normal.         Speech: Speech normal.         Behavior: Behavior normal. Behavior is cooperative.         Thought Content: Thought content normal.         Judgment: Judgment normal.            Signed Electronically by: DAWSON Lofton CNP    " Post-Care Instructions: I reviewed with the patient in detail post-care instructions. Patient is not to engage in any heavy lifting, exercise, or swimming for the next 14 days. Should the patient develop any fevers, chills, bleeding, severe pain patient will contact the office immediately.

## 2024-10-07 NOTE — PATIENT INSTRUCTIONS
Nortriptyline to help with what sounds like possible neuropathic pain     Meloxicam for joint pain refilled    Follow up at upcoming visit w/ Enedina Metz CNP to establish care as new PCP    Flu shot today.

## 2024-10-25 ENCOUNTER — ANCILLARY PROCEDURE (OUTPATIENT)
Dept: MAMMOGRAPHY | Facility: CLINIC | Age: 64
End: 2024-10-25
Attending: FAMILY MEDICINE
Payer: COMMERCIAL

## 2024-10-25 DIAGNOSIS — Z12.31 VISIT FOR SCREENING MAMMOGRAM: ICD-10-CM

## 2024-10-25 PROCEDURE — 77067 SCR MAMMO BI INCL CAD: CPT | Mod: TC | Performed by: RADIOLOGY

## 2024-10-25 PROCEDURE — 77063 BREAST TOMOSYNTHESIS BI: CPT | Mod: TC | Performed by: RADIOLOGY

## 2024-11-19 ENCOUNTER — OFFICE VISIT (OUTPATIENT)
Dept: FAMILY MEDICINE | Facility: CLINIC | Age: 64
End: 2024-11-19
Payer: COMMERCIAL

## 2024-11-19 VITALS
DIASTOLIC BLOOD PRESSURE: 71 MMHG | BODY MASS INDEX: 24.01 KG/M2 | SYSTOLIC BLOOD PRESSURE: 121 MMHG | HEART RATE: 74 BPM | WEIGHT: 135.5 LBS | HEIGHT: 63 IN | OXYGEN SATURATION: 98 % | RESPIRATION RATE: 22 BRPM | TEMPERATURE: 98.1 F

## 2024-11-19 DIAGNOSIS — Z13.29 SCREENING FOR THYROID DISORDER: ICD-10-CM

## 2024-11-19 DIAGNOSIS — Z13.1 SCREENING FOR DIABETES MELLITUS: ICD-10-CM

## 2024-11-19 DIAGNOSIS — Z00.00 ROUTINE GENERAL MEDICAL EXAMINATION AT A HEALTH CARE FACILITY: Primary | ICD-10-CM

## 2024-11-19 DIAGNOSIS — G62.9 NEUROPATHY: ICD-10-CM

## 2024-11-19 DIAGNOSIS — Z13.220 LIPID SCREENING: ICD-10-CM

## 2024-11-19 LAB
ERYTHROCYTE [DISTWIDTH] IN BLOOD BY AUTOMATED COUNT: 13.5 % (ref 10–15)
EST. AVERAGE GLUCOSE BLD GHB EST-MCNC: 117 MG/DL
HBA1C MFR BLD: 5.7 % (ref 0–5.6)
HCT VFR BLD AUTO: 37.4 % (ref 35–47)
HGB BLD-MCNC: 12.9 G/DL (ref 11.7–15.7)
MCH RBC QN AUTO: 27.2 PG (ref 26.5–33)
MCHC RBC AUTO-ENTMCNC: 34.5 G/DL (ref 31.5–36.5)
MCV RBC AUTO: 79 FL (ref 78–100)
PLATELET # BLD AUTO: 205 10E3/UL (ref 150–450)
RBC # BLD AUTO: 4.74 10E6/UL (ref 3.8–5.2)
WBC # BLD AUTO: 6.6 10E3/UL (ref 4–11)

## 2024-11-19 RX ORDER — GABAPENTIN 100 MG/1
100-300 CAPSULE ORAL AT BEDTIME
Qty: 90 CAPSULE | Refills: 0 | Status: SHIPPED | OUTPATIENT
Start: 2024-11-19

## 2024-11-19 RX ORDER — MULTIVITAMIN WITH IRON
1 TABLET ORAL DAILY
COMMUNITY

## 2024-11-19 SDOH — HEALTH STABILITY: PHYSICAL HEALTH: ON AVERAGE, HOW MANY MINUTES DO YOU ENGAGE IN EXERCISE AT THIS LEVEL?: 120 MIN

## 2024-11-19 SDOH — HEALTH STABILITY: PHYSICAL HEALTH: ON AVERAGE, HOW MANY DAYS PER WEEK DO YOU ENGAGE IN MODERATE TO STRENUOUS EXERCISE (LIKE A BRISK WALK)?: 4 DAYS

## 2024-11-19 ASSESSMENT — SOCIAL DETERMINANTS OF HEALTH (SDOH): HOW OFTEN DO YOU GET TOGETHER WITH FRIENDS OR RELATIVES?: PATIENT DECLINED

## 2024-11-19 ASSESSMENT — ANXIETY QUESTIONNAIRES
2. NOT BEING ABLE TO STOP OR CONTROL WORRYING: NOT AT ALL
3. WORRYING TOO MUCH ABOUT DIFFERENT THINGS: NOT AT ALL
GAD7 TOTAL SCORE: 1
IF YOU CHECKED OFF ANY PROBLEMS ON THIS QUESTIONNAIRE, HOW DIFFICULT HAVE THESE PROBLEMS MADE IT FOR YOU TO DO YOUR WORK, TAKE CARE OF THINGS AT HOME, OR GET ALONG WITH OTHER PEOPLE: NOT DIFFICULT AT ALL
GAD7 TOTAL SCORE: 1
7. FEELING AFRAID AS IF SOMETHING AWFUL MIGHT HAPPEN: SEVERAL DAYS
5. BEING SO RESTLESS THAT IT IS HARD TO SIT STILL: NOT AT ALL
6. BECOMING EASILY ANNOYED OR IRRITABLE: NOT AT ALL
1. FEELING NERVOUS, ANXIOUS, OR ON EDGE: NOT AT ALL

## 2024-11-19 ASSESSMENT — PATIENT HEALTH QUESTIONNAIRE - PHQ9: 5. POOR APPETITE OR OVEREATING: NOT AT ALL

## 2024-11-19 NOTE — PROGRESS NOTES
Preventive Care Visit  [facility]  Enedina MetzDAWSON CNP, Family Medicine  Nov 19, 2024      Assessment & Plan     (Z00.00) Routine general medical examination at a health care facility  (primary encounter diagnosis)  Comment: Reviewed health maintenance/screening services guidelines/recommendations as well as vaccination recommendations as indicated which Mary understands. Services ordered after shared decision making agreed upon. Recommended healthy habits/diet and exercise.      (G62.9) Neuropathy  Comment: Chronic medical condition, uncontrolled. Patient does not feel that she had any improvement from the nortriptyline that was started in October for probable neuropathy. Discussed treatment options and patient would like to trial gabapentin. Discussed medication in detail including risks/benefits and possible side effects. Discussed how to wean off nortriptyline. Follow-up in 1 month, sooner as needed. Will consider EMG testing in future.   Plan: gabapentin (NEURONTIN) 100 MG capsule    (Z13.1) Screening for diabetes mellitus  Plan: Comprehensive metabolic panel (BMP + Alb, Alk         Phos, ALT, AST, Total. Bili, TP), Hemoglobin         A1c    (Z13.220) Lipid screening  Plan: Lipid panel reflex to direct LDL Fasting    (Z13.29) Screening for thyroid disorder  Plan: TSH with free T4 reflex, CBC with platelets           Counseling  Appropriate preventive services were addressed with this patient via screening, questionnaire, or discussion as appropriate for fall prevention, nutrition, physical activity, Tobacco-use cessation, social engagement, weight loss and cognition.  Checklist reviewing preventive services available has been given to the patient.  Reviewed patient's diet, addressing concerns and/or questions.       Paulo Hernandez is a 64 year old, presenting for the following:  Physical (Might want to up the Nortriptyline, because still having pain in foot. She is worried  the medication might hurt her stomach, if taking a higher dose.)        11/19/2024     1:23 PM   Additional Questions   Roomed by Lisset         11/19/2024     1:36 PM   Patient Reported Additional Medications   Patient reports taking the following new medications Nortriptyline 10mg          HPI  Patient presents to the clinic today for an annual physical exam and to discuss her neuropathic pain. She was started on nortriptyline last month but patient does not feel like it has improved her pain at all.     Health Care Directive  Patient does not have a Health Care Directive: Discussed advance care planning with patient; however, patient declined at this time.      11/19/2024   General Health   How would you rate your overall physical health? (!) FAIR   Feel stress (tense, anxious, or unable to sleep) Only a little      (!) STRESS CONCERN      11/19/2024   Nutrition   Three or more servings of calcium each day? Yes   Diet: Regular (no restrictions)   How many servings of fruit and vegetables per day? (!) 2-3   How many sweetened beverages each day? 0-1            11/19/2024   Exercise   Days per week of moderate/strenous exercise 4 days   Average minutes spent exercising at this level 120 min            11/19/2024   Social Factors   Frequency of gathering with friends or relatives Patient declined   Worry food won't last until get money to buy more No   Food not last or not have enough money for food? No   Do you have housing? (Housing is defined as stable permanent housing and does not include staying ouside in a car, in a tent, in an abandoned building, in an overnight shelter, or couch-surfing.) Yes   Are you worried about losing your housing? No   Lack of transportation? No   Unable to get utilities (heat,electricity)? No            11/19/2024   Fall Risk   Fallen 2 or more times in the past year? No    Trouble with walking or balance? No        Patient-reported          11/19/2024   Dental   Dentist two times  every year? Yes            11/19/2024   TB Screening   Were you born outside of the [place]? No            Today's PHQ-9 Score:       10/7/2024     3:50 PM   PHQ-9 SCORE   PHQ-9 Total Score 5           11/19/2024   Substance Use   Alcohol more than 3/day or more than 7/wk No   Do you use any other substances recreationally? No        Social History     Tobacco Use    Smoking status: Never    Smokeless tobacco: Never   Vaping Use    Vaping status: Never Used   Substance Use Topics    Alcohol use: No     Alcohol/week: 0.0 standard drinks of alcohol    Drug use: No           10/25/2024   LAST FHS-7 RESULTS   1st degree relative breast or ovarian cancer No   Any relative bilateral breast cancer No   Any male have breast cancer No   Any ONE woman have BOTH breast AND ovarian cancer No   Any woman with breast cancer before 50yrs No   2 or more relatives with breast AND/OR ovarian cancer No   2 or more relatives with breast AND/OR bowel cancer No           Mammogram Screening - Mammogram every 1-2 years updated in Health Maintenance based on mutual decision making        11/19/2024   STI Screening   New sexual partner(s) since last STI/HIV test? No        History of abnormal Pap smear: No - age 30-64 HPV with reflex Pap every 5 years recommended        Latest Ref Rng & Units 11/16/2022     2:31 PM 3/23/2018     8:50 AM 3/23/2018     8:47 AM   PAP / HPV   PAP  Negative for Intraepithelial Lesion or Malignancy (NILM)      PAP (Historical)    NIL    HPV 16 DNA Negative Negative  Negative     HPV 18 DNA Negative Negative  Negative     Other HR HPV Negative Negative  Negative       ASCVD Risk   The 10-year ASCVD risk score (Sarika ANDRE, et al., 2019) is: 2.9%    Values used to calculate the score:      Age: 64 years      Sex: Female      Is Non- : No      Diabetic: No      Tobacco smoker: No      Systolic Blood Pressure: 121 mmHg      Is BP treated: No      HDL Cholesterol: 65 mg/dL      Total  "Cholesterol: 163 mg/dL         Reviewed and updated as needed this visit by Provider   Tobacco  Allergies  Meds  Problems  Med Hx  Surg Hx  Fam Hx            Objective    Exam  /71 (BP Location: Right arm, Patient Position: Sitting, Cuff Size: Adult Regular)   Pulse 74   Temp 98.1  F (36.7  C) (Oral)   Resp 22   Ht 1.6 m (5' 3\")   Wt 61.5 kg (135 lb 8 oz)   LMP  (LMP Unknown)   SpO2 98%   BMI 24.00 kg/m     Estimated body mass index is 24 kg/m  as calculated from the following:    Height as of this encounter: 1.6 m (5' 3\").    Weight as of this encounter: 61.5 kg (135 lb 8 oz).    Physical Exam  GENERAL: alert and no distress  EYES: Eyes grossly normal to inspection, PERRL and conjunctivae and sclerae normal  HENT: ear canals and TM's normal, nose and mouth without ulcers or lesions  NECK: no adenopathy, no asymmetry, masses, or scars  RESP: lungs clear to auscultation - no rales, rhonchi or wheezes  CV: regular rate and rhythm, normal S1 S2, no S3 or S4, no murmur, click or rub, no peripheral edema  ABDOMEN: soft, nontender, no hepatosplenomegaly, no masses and bowel sounds normal  MS: no gross musculoskeletal defects noted, no edema  SKIN: no suspicious lesions or rashes  NEURO: Normal strength and tone, mentation intact and speech normal  PSYCH: mentation appears normal, affect normal/bright        Signed Electronically by: DAWSON Vazquez CNP    "

## 2024-11-19 NOTE — PATIENT INSTRUCTIONS
"Patient Education   Consejos de atención preventiva  Se trata de consejos generales que solemos ofrecer para ayudar a las personas a mantenerse saludables. Weldon equipo de atención puede darle consejos específicos. Hable con ellos sobre oh propias necesidades de atención preventiva.  Estilo de heidi  Ejercítese, gaurav mínimo, 150 minutos a la semana (30 minutos al día, 5 días a la semana).  Realice actividades de fortalecimiento muscular 2 días a la semana, ya que contribuyen al control del peso y a la prevención de enfermedades.  No fume.  Use protector solar para prevenir el cáncer de piel.  Cada 2 a 5 años, realice pruebas de detección de radón en weldon hogar. Se trata de un gas incoloro e inodoro que puede dañar los pulmones. Para obtener más información, visite www.[email or web address].[email or web address] y busque \"Radon in Homes\" (Radón en los hogares).  Mantenga las jared descargadas y bajo llave en un lugar seguro, gaurav jodi caja nell o jodi cámara blindada, o use un candado para jared y esconda las llaves. Guarde siempre las balas por separado. Para obtener más información, visite [employer] y busque \"Safe Gun Storage\" (Almacenamiento seguro vipin).  Alimentación  Consuma 5 o más porciones de frutas y verduras al día.  Pruebe el pan de maxim, el arroz integral y la pasta integral (en lugar de pan trujillo, arroz turjillo y pasta).  Consuma suficiente calcio y vitamina D. Revise la etiqueta de los alimentos y procure alcanzar el 100 % de la ingesta diaria recomendada (IDR).  Exámenes periódicos  Hágase un examen dental y jodi limpieza cada 6 meses.  Visite a weldon equipo de atención médica todos los años para hablar sobre lo siguiente:  Todo cambio en weldon mesha.  Todo medicamento que weldon equipo de atención le haya recetado.  Atención preventiva, planificación familiar y formas de prevenir enfermedades crónicas.  Inyecciones (vacunas)   Vacunas contra el virus del papiloma humano (VPH) (hasta los 26 años), si nunca se las boswell colocado.  Vacunas " contra la hepatitis B (hasta los 59 años), si nunca se las boswell colocado.  Vacuna contra la COVID-19: vacúnese cuando corresponda.  Vacuna contra la gripe: vacúnese contra la gripe todos los años.  Vacuna contra el tétanos: vacúnese contra el tétanos cada 10 años.  Vacunas contra el neumococo, la hepatitis A y el virus sincicial respiratorio (VSR): pregunte a weldon equipo de atención si las necesita en función de weldon riesgo.  Vacuna contra el herpes zóster (para personas de 50 años o más).  Pruebas médicas generales  Examen de detección de diabetes:  A partir de los 35 años, hágase exámenes de detección de diabetes, gaurav mínimo, cada 3 años.  Si tiene menos de 35 años, pregunte a weldon equipo de atención si debe hacerlo.  Prueba de colesterol: a los 39 años, comience a hacerse jodi prueba de colesterol cada 5 años o con mayor frecuencia si se lo aconsejan.  Exploración de densidad ósea (DEXA): a los 50 años, pregunte a weldon equipo de atención si debe hacerse esta exploración para detectar osteoporosis (fragilidad en los huesos).  Hepatitis C: hágase la prueba, gaurav mínimo, jodi vez en la heidi.  Examen de detección de aneurismas aórticos abdominales: hable con weldon médico sobre la posibilidad de hacerse antonietta examen de detección si cumple alguna de las siguientes condiciones:  fumó alguna vez;  y  es hombre según weldon sexo biológico;  y  tiene entre 65 y 75 años.  Infecciones de transmisión sexual (ITS)  Antes de los 24 años, pregunte a weldon equipo de atención si debe hacerse exámenes de detección de ITS.  Después de los 24 años, hágase exámenes de detección de ITS si está en riesgo. Está en riesgo de contraer alguna ITS (incluido el virus de la inmunodeficiencia adquirida [VIH]) en los siguientes casos:  Tiene relaciones sexuales con más de jodi persona.  No usa preservativos siempre que tiene relaciones sexuales.  A usted o a weldon kareen le diagnosticaron jodi infección de transmisión sexual.  Si está en riesgo de contraer el VIH,  consulte sobre los medicamentos de la profilaxis de preexposición (Pre-Exposure Prophylaxis, PrEP) para prevenirlo.  Hágase la prueba del VIH, gaurav mínimo, jodi vez en la heidi, tanto si está en riesgo de contraer el virus gaurav si no.  Pruebas de detección de cáncer  Examen de detección de cáncer de melanie uterino: si tiene melanie uterino, comience a hacerse pruebas periódicas de detección de cáncer de melanie uterino a los 21 años. La mayoría de las personas que se hacen exámenes periódicos de detección y obtienen resultados normales pueden dejar de hacerlo a partir de los 65 años. Hable sobre esto con weldon proveedor.  Exploración de detección de cáncer de mama (mamografía): si alguna vez ha tenido mamas, comience a hacerse mamografías periódicas a partir de los 40 años. Se trata de jodi exploración para detectar el cáncer de mama.  Examen de detección de cáncer de colon: es importante comenzar a hacerse los exámenes de detección de cáncer de colon a los 45 años.  Hágase jodi colonoscopía cada 10 años (o con más frecuencia si está en riesgo) O pregunte a weldon proveedor sobre pruebas de heces, gaurav la prueba inmunoquímica fecal (Fecal Immunochemical Test, FIT) cada año o la prueba Cologuard cada 3 años.  Para obtener más información sobre las opciones de las pruebas que tiene a disposición, visite: www.fvfiles.com/466361ek.  Si necesita ayuda para mike jodi decisión, visite: [email or web address].  Prueba de detección de cáncer de próstata: si tiene próstata y está entre los 55 y 69 años, pregunte a weldon proveedor si le convendría hacerse jodi prueba anual de detección de cáncer de próstata.  Examen de detección de cáncer de pulmón: si fuma o fumó y tiene entre 50 y 80 años, pregunte a weldon equipo de atención si los exámenes continuos de detección de cáncer de pulmón son adecuados para usted.    Solo para uso con fines informativos. Ewelina documento no pretende sustituir ninguna recomendación médica. Derechos de autor   2023 [facility].   Todos los derechos reservados. Revisión clínica a [facility] Transitions Program. [employer] [id number] - REV 04/24.

## 2024-11-20 LAB
ALBUMIN SERPL BCG-MCNC: 4.3 G/DL (ref 3.5–5.2)
ALP SERPL-CCNC: 74 U/L (ref 40–150)
ALT SERPL W P-5'-P-CCNC: 23 U/L (ref 0–50)
ANION GAP SERPL CALCULATED.3IONS-SCNC: 14 MMOL/L (ref 7–15)
AST SERPL W P-5'-P-CCNC: 30 U/L (ref 0–45)
BILIRUB SERPL-MCNC: 0.2 MG/DL
BUN SERPL-MCNC: 13.2 MG/DL (ref 8–23)
CALCIUM SERPL-MCNC: 9.5 MG/DL (ref 8.8–10.4)
CHLORIDE SERPL-SCNC: 107 MMOL/L (ref 98–107)
CHOLEST SERPL-MCNC: 158 MG/DL
CREAT SERPL-MCNC: 0.81 MG/DL (ref 0.51–0.95)
EGFRCR SERPLBLD CKD-EPI 2021: 82 ML/MIN/1.73M2
FASTING STATUS PATIENT QL REPORTED: YES
FASTING STATUS PATIENT QL REPORTED: YES
GLUCOSE SERPL-MCNC: 100 MG/DL (ref 70–99)
HCO3 SERPL-SCNC: 22 MMOL/L (ref 22–29)
HDLC SERPL-MCNC: 57 MG/DL
LDLC SERPL CALC-MCNC: 73 MG/DL
NONHDLC SERPL-MCNC: 101 MG/DL
POTASSIUM SERPL-SCNC: 4.3 MMOL/L (ref 3.4–5.3)
PROT SERPL-MCNC: 7.5 G/DL (ref 6.4–8.3)
SODIUM SERPL-SCNC: 143 MMOL/L (ref 135–145)
TRIGL SERPL-MCNC: 141 MG/DL
TSH SERPL DL<=0.005 MIU/L-ACNC: 1.07 UIU/ML (ref 0.3–4.2)

## 2024-12-17 ENCOUNTER — OFFICE VISIT (OUTPATIENT)
Dept: FAMILY MEDICINE | Facility: CLINIC | Age: 64
End: 2024-12-17
Payer: COMMERCIAL

## 2024-12-17 VITALS
HEIGHT: 63 IN | SYSTOLIC BLOOD PRESSURE: 134 MMHG | OXYGEN SATURATION: 97 % | TEMPERATURE: 97.8 F | BODY MASS INDEX: 24.27 KG/M2 | DIASTOLIC BLOOD PRESSURE: 74 MMHG | RESPIRATION RATE: 18 BRPM | WEIGHT: 137 LBS | HEART RATE: 74 BPM

## 2024-12-17 DIAGNOSIS — G62.9 NEUROPATHY: ICD-10-CM

## 2024-12-17 DIAGNOSIS — G62.9 NEUROPATHY: Primary | ICD-10-CM

## 2024-12-17 PROCEDURE — G2211 COMPLEX E/M VISIT ADD ON: HCPCS

## 2024-12-17 PROCEDURE — 99214 OFFICE O/P EST MOD 30 MIN: CPT

## 2024-12-17 RX ORDER — DULOXETIN HYDROCHLORIDE 30 MG/1
CAPSULE, DELAYED RELEASE ORAL
Qty: 30 CAPSULE | Refills: 0 | OUTPATIENT
Start: 2024-12-17

## 2024-12-17 RX ORDER — DULOXETIN HYDROCHLORIDE 30 MG/1
CAPSULE, DELAYED RELEASE ORAL
Qty: 30 CAPSULE | Refills: 0 | Status: SHIPPED | OUTPATIENT
Start: 2024-12-17 | End: 2024-12-18

## 2024-12-17 RX ORDER — DULOXETIN HYDROCHLORIDE 30 MG/1
30 CAPSULE, DELAYED RELEASE ORAL DAILY
Qty: 30 CAPSULE | Refills: 1 | Status: SHIPPED | OUTPATIENT
Start: 2024-12-17

## 2024-12-17 NOTE — PROGRESS NOTES
Assessment & Plan     (G62.9) Neuropathy  (primary encounter diagnosis)  Comment: Chronic medical condition, uncontrolled. Patient had tried and failed both nortriptyline and gabapentin and continues to have ongoing burning pain in bilateral lower extremities and bilateral upper extremities. Discussed treatment options and patient would like to trial Cymbalta. Discussed medication in detail including risks/benefits and possible side effects. EMG testing ordered. Follow-up in 1 month, sooner as needed.   Plan: EMG, DULoxetine (CYMBALTA) 30 MG capsule      The longitudinal plan of care for the diagnosis(es)/condition(s) as documented were addressed during this visit. Due to the added complexity in care, I will continue to support Mary in the subsequent management and with ongoing continuity of care.    Paulo Hernandez is a 64 year old, presenting for the following health issues:  Musculoskeletal Problem  Patient has been experiencing ongoing burning pain in bilateral lower extremities and intermittently in her bilateral upper extremities. She has tried nortriptyline and gabapentin with no relief.       11/19/2024     1:23 PM   Additional Questions   Roomed by Lisset     Musculoskeletal Problem    History of Present Illness       Reason for visit:  Feet pain follow up    She eats 4 or more servings of fruits and vegetables daily.She consumes 0 sweetened beverage(s) daily.She exercises with enough effort to increase her heart rate 60 or more minutes per day.  She exercises with enough effort to increase her heart rate 4 days per week.   She is taking medications regularly.       Medication Followup of Gabapentin   Taking Medication as prescribed: yes  Side Effects:  patient states she feels her pressure in her muscles during movement   Medication Helping Symptoms:  NO  Pain History:  When did you first notice your pain? 3 months ago    Have you seen this provider for your pain in the past? Yes   Where in your  "body do you have pain? Both feet   Are you seeing anyone else for your pain? No        11/16/2022     1:54 PM 7/3/2023     2:55 PM 10/7/2024     3:50 PM   PHQ-9 SCORE   PHQ-9 Total Score MyChart 0     PHQ-9 Total Score 0 5 5           11/9/2021     5:22 PM 10/7/2024     3:54 PM 11/19/2024     1:30 PM   VIKTORIA-7 SCORE   Total Score 3 (minimal anxiety)     Total Score 3 2 1             PDMP Review       None              Objective    /74 (BP Location: Right arm, Patient Position: Sitting, Cuff Size: Adult Regular)   Pulse 74   Temp 97.8  F (36.6  C) (Oral)   Resp 18   Ht 1.6 m (5' 3\")   Wt 62.1 kg (137 lb)   LMP  (LMP Unknown)   SpO2 97%   BMI 24.27 kg/m    Body mass index is 24.27 kg/m .  Physical Exam   GENERAL: alert and no distress  RESP: lungs clear to auscultation - no rales, rhonchi or wheezes  CV: regular rate and rhythm, normal S1 S2, no S3 or S4, no murmur, click or rub, no peripheral edema  MS: no gross musculoskeletal defects noted, no edema            Signed Electronically by: DAWSON Vazquez CNP    "

## 2024-12-18 RX ORDER — DULOXETIN HYDROCHLORIDE 30 MG/1
CAPSULE, DELAYED RELEASE ORAL
Qty: 30 CAPSULE | Refills: 0 | OUTPATIENT
Start: 2024-12-18

## 2025-01-09 DIAGNOSIS — G62.9 NEUROPATHY: ICD-10-CM

## 2025-01-09 RX ORDER — DULOXETIN HYDROCHLORIDE 30 MG/1
30 CAPSULE, DELAYED RELEASE ORAL DAILY
Qty: 90 CAPSULE | Refills: 1 | Status: SHIPPED | OUTPATIENT
Start: 2025-01-09

## 2025-03-17 ENCOUNTER — OFFICE VISIT (OUTPATIENT)
Dept: FAMILY MEDICINE | Facility: CLINIC | Age: 65
End: 2025-03-17
Payer: COMMERCIAL

## 2025-03-17 VITALS
HEART RATE: 66 BPM | TEMPERATURE: 97.9 F | DIASTOLIC BLOOD PRESSURE: 84 MMHG | HEIGHT: 62 IN | RESPIRATION RATE: 16 BRPM | WEIGHT: 134 LBS | BODY MASS INDEX: 24.66 KG/M2 | SYSTOLIC BLOOD PRESSURE: 138 MMHG | OXYGEN SATURATION: 97 %

## 2025-03-17 DIAGNOSIS — K59.09 OTHER CONSTIPATION: Primary | ICD-10-CM

## 2025-03-17 DIAGNOSIS — Z80.0 FAMILY HISTORY OF COLON CANCER: ICD-10-CM

## 2025-03-17 PROCEDURE — 3075F SYST BP GE 130 - 139MM HG: CPT | Performed by: NURSE PRACTITIONER

## 2025-03-17 PROCEDURE — 99214 OFFICE O/P EST MOD 30 MIN: CPT | Performed by: NURSE PRACTITIONER

## 2025-03-17 PROCEDURE — 1125F AMNT PAIN NOTED PAIN PRSNT: CPT | Performed by: NURSE PRACTITIONER

## 2025-03-17 PROCEDURE — 3079F DIAST BP 80-89 MM HG: CPT | Performed by: NURSE PRACTITIONER

## 2025-03-17 RX ORDER — POLYETHYLENE GLYCOL 3350 17 G/17G
1 POWDER, FOR SOLUTION ORAL DAILY PRN
Qty: 255 G | Refills: 2 | Status: SHIPPED | OUTPATIENT
Start: 2025-03-17

## 2025-03-17 RX ORDER — MAGNESIUM CARB/ALUMINUM HYDROX 105-160MG
296 TABLET,CHEWABLE ORAL ONCE
Qty: 296 ML | Refills: 0 | Status: CANCELLED | OUTPATIENT
Start: 2025-03-17 | End: 2025-03-17

## 2025-03-17 RX ORDER — SENNOSIDES 8.6 MG
2 TABLET ORAL DAILY PRN
Qty: 30 TABLET | Refills: 0 | Status: SHIPPED | OUTPATIENT
Start: 2025-03-17

## 2025-03-17 ASSESSMENT — ENCOUNTER SYMPTOMS
COUGH: 0
CONSTIPATION: 1
MUSCULOSKELETAL NEGATIVE: 1
BLOOD IN STOOL: 0
PSYCHIATRIC NEGATIVE: 1
ABDOMINAL DISTENTION: 0
ABDOMINAL PAIN: 0
NAUSEA: 0
VOMITING: 0
HEADACHES: 0
CHEST TIGHTNESS: 0
PALPITATIONS: 0
DIZZINESS: 0
SHORTNESS OF BREATH: 0
UNEXPECTED WEIGHT CHANGE: 0
APPETITE CHANGE: 0
FATIGUE: 0
CHILLS: 0
ANAL BLEEDING: 0
DIARRHEA: 0
FEVER: 0

## 2025-03-17 ASSESSMENT — PATIENT HEALTH QUESTIONNAIRE - PHQ9
SUM OF ALL RESPONSES TO PHQ QUESTIONS 1-9: 5
10. IF YOU CHECKED OFF ANY PROBLEMS, HOW DIFFICULT HAVE THESE PROBLEMS MADE IT FOR YOU TO DO YOUR WORK, TAKE CARE OF THINGS AT HOME, OR GET ALONG WITH OTHER PEOPLE: NOT DIFFICULT AT ALL
SUM OF ALL RESPONSES TO PHQ QUESTIONS 1-9: 5

## 2025-03-17 ASSESSMENT — PAIN SCALES - GENERAL: PAINLEVEL_OUTOF10: SEVERE PAIN (7)

## 2025-03-17 NOTE — PATIENT INSTRUCTIONS
Start with senna tablets- take 2 daily until you feel like bowels are cleaned out.   Then transition to the Miralax powder.    Start over the counter fiber supplement like psyllium husks or Benefiber.  Stay well hydrated and active.    Referral to gastrointestinal specialist placed.

## 2025-03-17 NOTE — PROGRESS NOTES
Assessment & Plan     Other constipation  Family history of colon cancer  Intermittent constipation in the past but was well managed. Recent worsening in the past month. Normal exam today. No red flags. She would like to see GI due to family hx of colon cancer. Last colonoscopy in 2019. 10 year recall in chart but may need more often due to family hx. Will defer to GI. Discussed fiber intake and adding supplement. Continue regular exercise/walks and hydration. Use senna daily for the next week or so then transition to Miralax PRN. Return precautions discussed. Questions answered.    - Adult GI  Referral - Consult Only; Future  - polyethylene glycol (MIRALAX) 17 GM/Dose powder; Take 17 g (1 Capful) by mouth daily as needed for constipation.  - sennosides (SENOKOT) 8.6 MG tablet; Take 2 tablets by mouth daily as needed for constipation.        Paulo Hernandez is a 65 year old, presenting for the following health issues:  Constipation      3/17/2025     8:11 AM   Additional Questions   Roomed by Ivy         3/17/2025     8:11 AM   Patient Reported Additional Medications   Patient reports taking the following new medications vit c, Vit D     History of Present Illness       Reason for visit:  Constipation  Symptom onset:  More than a month  Symptoms include:  Difficulty going, urgency to have to go again  Symptom intensity:  Severe  Symptom progression:  Staying the same  Had these symptoms before:  Yes  Has tried/received treatment for these symptoms:  Yes  Previous treatment was successful:  No  What makes it worse:  Sitting for long periods of time  What makes it better:  NA   She is taking medications regularly.       used during entire visit.   Intermittent issues with constipation previously. Felt like she was doing pretty well- having daily BM without issue. Around a month ago started to become constipated. Has daily BM that is small and hard. Some rectal discomfort at times  "when passing. No bleeding.  Drinking OTC constipation tea will help. Miralax hasn't helped much previously.  Eats plenty of veggies and water. Exercises regularly.   Last colonoscopy 2019  Father had colon cancer at age 72.   No new meds/supplements. Otherwise feels well.      Review of Systems   Constitutional:  Negative for appetite change, chills, fatigue, fever and unexpected weight change.   HENT: Negative.     Respiratory:  Negative for cough, chest tightness and shortness of breath.    Cardiovascular:  Negative for chest pain and palpitations.   Gastrointestinal:  Positive for constipation. Negative for abdominal distention, abdominal pain, anal bleeding, blood in stool, diarrhea, nausea and vomiting.   Genitourinary: Negative.    Musculoskeletal: Negative.    Skin:  Negative for rash.   Neurological:  Negative for dizziness and headaches.   Psychiatric/Behavioral: Negative.     All other systems reviewed and are negative.          Objective    /84 (BP Location: Right arm, Patient Position: Sitting, Cuff Size: Adult Regular)   Pulse 66   Temp 97.9  F (36.6  C) (Oral)   Resp 16   Ht 1.562 m (5' 1.5\")   Wt 60.8 kg (134 lb)   LMP  (LMP Unknown)   SpO2 97%   BMI 24.91 kg/m    Body mass index is 24.91 kg/m .  Physical Exam  Constitutional:       Appearance: Normal appearance.   HENT:      Head: Normocephalic.      Mouth/Throat:      Mouth: Mucous membranes are moist.      Pharynx: Oropharynx is clear.   Eyes:      Conjunctiva/sclera: Conjunctivae normal.   Cardiovascular:      Rate and Rhythm: Normal rate and regular rhythm.      Heart sounds: Normal heart sounds.   Pulmonary:      Effort: Pulmonary effort is normal.      Breath sounds: Normal breath sounds.   Abdominal:      General: Abdomen is flat. Bowel sounds are normal. There is no distension.      Palpations: Abdomen is soft. There is no hepatomegaly, splenomegaly or mass.      Tenderness: There is no abdominal tenderness. There is no guarding " or rebound. Negative signs include Torres's sign.      Hernia: No hernia is present.   Skin:     General: Skin is warm and dry.   Neurological:      General: No focal deficit present.      Mental Status: She is alert and oriented to person, place, and time.          Office Visit on 11/19/2024   Component Date Value Ref Range Status    Cholesterol 11/19/2024 158  <200 mg/dL Final    Triglycerides 11/19/2024 141  <150 mg/dL Final    Direct Measure HDL 11/19/2024 57  >=50 mg/dL Final    LDL Cholesterol Calculated 11/19/2024 73  <100 mg/dL Final    Non HDL Cholesterol 11/19/2024 101  <130 mg/dL Final    Patient Fasting > 8hrs? 11/19/2024 Yes   Final    TSH 11/19/2024 1.07  0.30 - 4.20 uIU/mL Final    Sodium 11/19/2024 143  135 - 145 mmol/L Final    Potassium 11/19/2024 4.3  3.4 - 5.3 mmol/L Final    Carbon Dioxide (CO2) 11/19/2024 22  22 - 29 mmol/L Final    Anion Gap 11/19/2024 14  7 - 15 mmol/L Final    Urea Nitrogen 11/19/2024 13.2  8.0 - 23.0 mg/dL Final    Creatinine 11/19/2024 0.81  0.51 - 0.95 mg/dL Final    GFR Estimate 11/19/2024 82  >60 mL/min/1.73m2 Final    eGFR calculated using 2021 CKD-EPI equation.    Calcium 11/19/2024 9.5  8.8 - 10.4 mg/dL Final    Reference intervals for this test were updated on 7/16/2024 to reflect our healthy population more accurately. There may be differences in the flagging of prior results with similar values performed with this method. Those prior results can be interpreted in the context of the updated reference intervals.    Chloride 11/19/2024 107  98 - 107 mmol/L Final    Glucose 11/19/2024 100 (H)  70 - 99 mg/dL Final    Alkaline Phosphatase 11/19/2024 74  40 - 150 U/L Final    AST 11/19/2024 30  0 - 45 U/L Final    ALT 11/19/2024 23  0 - 50 U/L Final    Protein Total 11/19/2024 7.5  6.4 - 8.3 g/dL Final    Albumin 11/19/2024 4.3  3.5 - 5.2 g/dL Final    Bilirubin Total 11/19/2024 0.2  <=1.2 mg/dL Final    Patient Fasting > 8hrs? 11/19/2024 Yes   Final    WBC Count  11/19/2024 6.6  4.0 - 11.0 10e3/uL Final    RBC Count 11/19/2024 4.74  3.80 - 5.20 10e6/uL Final    Hemoglobin 11/19/2024 12.9  11.7 - 15.7 g/dL Final    Hematocrit 11/19/2024 37.4  35.0 - 47.0 % Final    MCV 11/19/2024 79  78 - 100 fL Final    MCH 11/19/2024 27.2  26.5 - 33.0 pg Final    MCHC 11/19/2024 34.5  31.5 - 36.5 g/dL Final    RDW 11/19/2024 13.5  10.0 - 15.0 % Final    Platelet Count 11/19/2024 205  150 - 450 10e3/uL Final    Estimated Average Glucose 11/19/2024 117 (H)  <117 mg/dL Final    Hemoglobin A1C 11/19/2024 5.7 (H)  0.0 - 5.6 % Final    Normal <5.7%   Prediabetes 5.7-6.4%    Diabetes 6.5% or higher     Note: Adopted from ADA consensus guidelines.           Signed Electronically by: DAWSON Castaneda CNP        I spent a total of 33 minutes on the day of the visit.   Time spent by me today doing chart review, history and exam, documentation and further activities per the note

## 2025-04-08 ENCOUNTER — OFFICE VISIT (OUTPATIENT)
Dept: NEUROLOGY | Facility: CLINIC | Age: 65
End: 2025-04-08
Payer: COMMERCIAL

## 2025-04-08 DIAGNOSIS — R20.2 PARESTHESIA OF BOTH FEET: Primary | ICD-10-CM

## 2025-04-08 DIAGNOSIS — R20.2 RIGHT HAND PARESTHESIA: ICD-10-CM

## 2025-04-08 PROCEDURE — 95911 NRV CNDJ TEST 9-10 STUDIES: CPT | Performed by: PSYCHIATRY & NEUROLOGY

## 2025-04-08 NOTE — PROGRESS NOTES
[facility]  Electrodiagnostic Laboratory                 Department of Neurology                                                                                                         Test Date:  2025    Patient: Mary Carbajal : 1960 Physician: Neal Monson MD   Sex: Female AGE: 65 year Ref Phys: Enedina DAWSON Metz CNP   ID#: [id number]   Technician:      History and Examination:    65-year-old woman with chief complaint of paresthesias including burning pain or freezing sensation at her feet bilaterally, and to a lesser extent the hands.  EMG was requested to evaluate for large fiber polyneuropathy.    Techniques:    Motor and sensory nerve conduction studies were done with surface recording electrodes. Temperature was monitored and recorded throughout the study. Upper extremities were maintained at a temperature of 32 degrees Centigrade or higher and Lower extremities were maintained at a temperature of 31 degrees Centigrade or higher.     Results    Left fibular (EDB) motor nerve conduction study showed prolonged distal latency and normal CMAP amplitude (this could be due to temperature effect).  Right fibular (EDB), tibial (AH), and median (APB) motor nerve conduction studies were normal.  Right median, radial, sural, and bilateral superficial fibular antidromic sensory nerve conduction studies were all normal.    Needle EMG was deferred in consideration of the unique situation of the patient's presentation of pure sensory symptoms restricted to both feet with normal motor examination and normal motor and sensory nerve conduction studies, with no symptoms or signs of radiculopathy or plexopathy.     Interpretation:    Essentially normal study.  No electrodiagnostic evidence of large fiber polyneuropathy.  Please note that a purely small fiber neuropathy explaining the patient's sensory symptoms cannot be excluded.  Note was also made of a slightly  prolonged left fibular motor distal latency, which may be technical due to low distal limb temperature.       ___________________________  Neal Monson MD        Nerve Conduction Studies  Motor Sites      Latency Neg. Amp Neg. Amp Diff Segment Distance Velocity Neg. Dur Neg Area Diff Temperature Comment   Site (ms) Norm (mV) Norm (%)  cm m/s Norm (ms) (%) ( C)    Left Fibular (EDB) Motor   Ankle 6.9  < 6.0 2.7 -  Ankle-EDB 8   9.8  26    Right Fibular (EDB) Motor   Ankle 4.4  < 6.0 5.2 -  Ankle-EDB 8   6.3  26    Bel Fibular Head 10.2 - 4.5 - -13 Bel Fibular Head-Ankle 28 48  > 38 7.0 -1 27.7    Pop Fossa 11.9 - 4.2 - -7 Pop Fossa-Bel Fibular Head 9 53  > 38 6.9 -10 30.2    Right Median (APB) Motor   Wrist 3.5  < 4.4 11.9  > 5.0  Wrist-APB 8   4.8  25.7    Elbow 6.6 - 11.4  > 5.0 -4 Elbow-Wrist 19 61  > 48 5.1 -2 25.6    Right Tibial (AHB) Motor   Ankle 4.3  < 6.5 9.6  > 5.0  Ankle-AH 8   5.3  28.4    Knee 11.8 - 7.8 - -19 Knee-Ankle 35 47  > 38 6.8 -2 27.9      Sensory Sites      Onset Lat Peak Lat Amp (O-P) Amp (P-P) Segment Distance Velocity Temperature Comment   Site ms (ms)  V Norm ( V)  cm m/s Norm ( C)    Right Median Sensory   Wrist 2.5 3.3 56  > 10 82 Wrist-Dig II 14 56  > 48 25.6    Right Radial Sensory   Forearm-Wrist 1.38 1.93 45  > 15 65 Forearm-Wrist 10 72 - 25.8    Left Superficial Fibular Sensory   Lower Leg-Ankle 2.6 3.4 22  > 3 34 Lower Leg-Ankle 12.5 48 - 25.9    Right Superficial Fibular Sensory   Lower Leg-Ankle 2.4 3.2 24  > 3 32 Lower Leg-Ankle 12.5 52 - 27.3    Right Sural Sensory   Calf-Lat Malleolus 2.7 3.3 22  > 5 - Calf-Lat Malleolus 14 52  > 38 26.4            Waveforms / Images:    Motor                Sensory

## 2025-04-08 NOTE — LETTER
2025      Mary Carbajal  [address] [facility] [address]      Dear Colleague,    Thank you for referring your patient, Mary Carbajal, to the [facility]. Please see a copy of my visit note below.                        [facility]  Electrodiagnostic Laboratory                 Department of Neurology                                                                                                         Test Date:  2025    Patient: Mary Carbajal : 1960 Physician: Neal Monson MD   Sex: Female AGE: 65 year Ref Phys: DAWSON Vazquez CNP   ID#: [id number]   Technician:      History and Examination:    65-year-old woman with chief complaint of paresthesias including burning pain or freezing sensation at her feet bilaterally, and to a lesser extent the hands.  EMG was requested to evaluate for large fiber polyneuropathy.    Techniques:    Motor and sensory nerve conduction studies were done with surface recording electrodes. Temperature was monitored and recorded throughout the study. Upper extremities were maintained at a temperature of 32 degrees Centigrade or higher and Lower extremities were maintained at a temperature of 31 degrees Centigrade or higher.     Results    Left fibular (EDB) motor nerve conduction study showed prolonged distal latency and normal CMAP amplitude (this could be due to temperature effect).  Right fibular (EDB), tibial (AH), and median (APB) motor nerve conduction studies were normal.  Right median, radial, sural, and bilateral superficial fibular antidromic sensory nerve conduction studies were all normal.    Needle EMG was deferred in consideration of the unique situation of the patient's presentation of pure sensory symptoms restricted to both feet with normal motor examination and normal motor and sensory nerve conduction studies, with no symptoms or signs of radiculopathy or plexopathy.      Interpretation:    Essentially normal study.  No electrodiagnostic evidence of large fiber polyneuropathy.  Please note that a purely small fiber neuropathy explaining the patient's sensory symptoms cannot be excluded.  Note was also made of a slightly prolonged left fibular motor distal latency, which may be technical due to low distal limb temperature.       ___________________________  Neal Monson MD        Nerve Conduction Studies  Motor Sites      Latency Neg. Amp Neg. Amp Diff Segment Distance Velocity Neg. Dur Neg Area Diff Temperature Comment   Site (ms) Norm (mV) Norm (%)  cm m/s Norm (ms) (%) ( C)    Left Fibular (EDB) Motor   Ankle 6.9  < 6.0 2.7 -  Ankle-EDB 8   9.8  26    Right Fibular (EDB) Motor   Ankle 4.4  < 6.0 5.2 -  Ankle-EDB 8   6.3  26    Bel Fibular Head 10.2 - 4.5 - -13 Bel Fibular Head-Ankle 28 48  > 38 7.0 -1 27.7    Pop Fossa 11.9 - 4.2 - -7 Pop Fossa-Bel Fibular Head 9 53  > 38 6.9 -10 30.2    Right Median (APB) Motor   Wrist 3.5  < 4.4 11.9  > 5.0  Wrist-APB 8   4.8  25.7    Elbow 6.6 - 11.4  > 5.0 -4 Elbow-Wrist 19 61  > 48 5.1 -2 25.6    Right Tibial (AHB) Motor   Ankle 4.3  < 6.5 9.6  > 5.0  Ankle-AH 8   5.3  28.4    Knee 11.8 - 7.8 - -19 Knee-Ankle 35 47  > 38 6.8 -2 27.9      Sensory Sites      Onset Lat Peak Lat Amp (O-P) Amp (P-P) Segment Distance Velocity Temperature Comment   Site ms (ms)  V Norm ( V)  cm m/s Norm ( C)    Right Median Sensory   Wrist 2.5 3.3 56  > 10 82 Wrist-Dig II 14 56  > 48 25.6    Right Radial Sensory   Forearm-Wrist 1.38 1.93 45  > 15 65 Forearm-Wrist 10 72 - 25.8    Left Superficial Fibular Sensory   Lower Leg-Ankle 2.6 3.4 22  > 3 34 Lower Leg-Ankle 12.5 48 - 25.9    Right Superficial Fibular Sensory   Lower Leg-Ankle 2.4 3.2 24  > 3 32 Lower Leg-Ankle 12.5 52 - 27.3    Right Sural Sensory   Calf-Lat Malleolus 2.7 3.3 22  > 5 - Calf-Lat Malleolus 14 52  > 38 26.4            Waveforms / Images:    Motor                Sensory                             Again, thank you for allowing me to participate in the care of your patient.        Sincerely,        Neal Monson MD    Electronically signed

## 2025-04-09 DIAGNOSIS — R52 BURNING PAIN: Primary | ICD-10-CM

## 2025-04-10 ENCOUNTER — APPOINTMENT (OUTPATIENT)
Dept: INTERPRETER SERVICES | Facility: CLINIC | Age: 65
End: 2025-04-10
Payer: COMMERCIAL

## 2025-04-10 NOTE — RESULT ENCOUNTER NOTE
Left voice message with  to return call to clinic- please communicated result message.  Gabrielle Uriostegui CMA on 4/10/2025 at 9:35 AM

## 2025-04-10 NOTE — RESULT ENCOUNTER NOTE
Results were communicated to patient with . Also, referral was faxed to neurology.  Gabrielle Uriostegui CMA on 4/10/2025 at 10:04 AM

## 2025-06-26 ENCOUNTER — OFFICE VISIT (OUTPATIENT)
Dept: FAMILY MEDICINE | Facility: CLINIC | Age: 65
End: 2025-06-26
Payer: COMMERCIAL

## 2025-06-26 VITALS
OXYGEN SATURATION: 98 % | RESPIRATION RATE: 18 BRPM | BODY MASS INDEX: 24.9 KG/M2 | HEART RATE: 71 BPM | SYSTOLIC BLOOD PRESSURE: 124 MMHG | DIASTOLIC BLOOD PRESSURE: 72 MMHG | HEIGHT: 62 IN | TEMPERATURE: 98 F | WEIGHT: 135.3 LBS

## 2025-06-26 DIAGNOSIS — H25.013 CORTICAL AGE-RELATED CATARACT OF BOTH EYES: ICD-10-CM

## 2025-06-26 DIAGNOSIS — J30.2 SEASONAL ALLERGIC RHINITIS, UNSPECIFIED TRIGGER: ICD-10-CM

## 2025-06-26 DIAGNOSIS — Z01.818 PREOP GENERAL PHYSICAL EXAM: Primary | ICD-10-CM

## 2025-06-26 RX ORDER — FLUTICASONE PROPIONATE 50 MCG
1 SPRAY, SUSPENSION (ML) NASAL DAILY
Qty: 18.2 ML | Refills: 3 | Status: SHIPPED | OUTPATIENT
Start: 2025-06-26

## 2025-06-26 ASSESSMENT — PAIN SCALES - GENERAL: PAINLEVEL_OUTOF10: NO PAIN (0)

## 2025-06-26 NOTE — PROGRESS NOTES
Preoperative Evaluation  [facility]  [address] [facility] [address]  Phone: [phone]  Primary Provider: DAWSON Vazquez CNP  Pre-op Performing Provider: Holly May MD  Jun 26, 2025 6/26/2025   Surgical Information   What procedure is being done? Cataract    Facility or Hospital where procedure/surgery [facility] be performed[facility]    Who is doing the procedure / surgery? Unknown    Date of surgery / procedure: 07/01/2025    Time of surgery / procedure: Unknown    Where do you plan to recover after surgery? at home with family        Proxy-reported     Fax number for surgical facility: [phone]    Assessment & Plan     The proposed surgical procedure is considered LOW risk.    Preop general physical exam:      Cortical age-related cataract of both eyes:  - Proceed with cataract surgery as scheduled.    Seasonal allergic rhinitis, unspecified trigger:  Has been using flonase daily  Would like a prescription today as insurance pays  - Seasonal allergies managed with Flonase.      Consent was obtained from the patient to use an AI documentation tool in the creation of this note.            - No identified additional risk factors other than previously addressed         Recommendation  Approval given to proceed with proposed procedure, without further diagnostic evaluation.        Paulo Hernandez is a 65 year old, presenting for the following:  Pre-Op Exam          6/26/2025     9:11 AM   Additional Questions   Roomed by Lisset YI         6/26/2025     9:27 AM   Patient Reported Additional Medications   Patient reports taking the following new medications Vitamins E, C, B-12 ( would like to be prescribed B-12)     HPI: Wants a refill on flonase and b12    Discussed checking B12 with her next physical to decide if supplement is needed.           6/26/2025   Pre-Op Questionnaire   Have you ever had a heart attack or stroke? No    Have you ever had  surgery on your heart or blood vessels, such as a stent placement, a coronary artery bypass, or surgery on an artery in your head, neck, heart, or legs? No    Do you have chest pain with activity? No    Do you have a history of heart failure? No    Do you currently have a cold, bronchitis or symptoms of other infection? No    Do you have a cough, shortness of breath, or wheezing? No    Do you or anyone in your family have previous history of blood clots? No    Do you or does anyone in your family have a serious bleeding problem such as prolonged bleeding following surgeries or cuts? No    Have you ever had problems with anemia or been told to take iron pills? No    Have you had any abnormal blood loss such as black, tarry or bloody stools, or abnormal vaginal bleeding? No    Have you ever had a blood transfusion? (!) YES    Have you ever had a transfusion reaction? No    Are you willing to have a blood transfusion if it is medically needed before, during, or after your surgery? Yes    Have you or any of your relatives ever had problems with anesthesia? No    Do you have sleep apnea, excessive snoring or daytime drowsiness? No    Do you have any artifical heart valves or other implanted medical devices like a pacemaker, defibrillator, or continuous glucose monitor? No    Do you have artificial joints? No    Are you allergic to latex? No        Proxy-reported     Advance Care Planning    Discussed advance care planning with patient; informed AVS has link to [facility] [employer].    Preoperative Review of           Patient Active Problem List    Diagnosis Date Noted    Visit for screening mammogram 09/26/2023     Priority: Medium    Financial problems 08/29/2023     Priority: Medium    Strain of right hamstring 08/29/2023     Priority: Medium    Arthralgia, unspecified joint 08/29/2023     Priority: Medium    Bilateral shoulder pain, unspecified chronicity 07/03/2023     Priority: Medium    Lateral epicondylitis of  right elbow 07/03/2023     Priority: Medium    Medial epicondylitis of elbow, unspecified laterality 07/03/2023     Priority: Medium    Trochanteric bursitis of right hip 03/29/2023     Priority: Medium    Acute midline low back pain with right-sided sciatica 03/29/2023     Priority: Medium    Memory loss 03/29/2023     Priority: Medium    Cervical cancer screening 11/16/2022     Priority: Medium    Sacroiliac joint pain 11/16/2022     Priority: Medium    Palpitations 06/09/2022     Priority: Medium    Primary osteoarthritis of both first carpometacarpal joints 12/16/2021     Priority: Medium    Need for prophylactic vaccination and inoculation against influenza 11/09/2021     Priority: Medium    TMJ (temporomandibular joint syndrome) 02/04/2021     Priority: Medium    Aphthae, oral 02/04/2021     Priority: Medium    Dysesthesia 01/29/2020     Priority: Medium    Anxiety 01/29/2020     Priority: Medium    Insomnia, unspecified type 10/12/2018     Priority: Medium    Skin macule 10/12/2018     Priority: Medium    Mild single current episode of major depressive disorder 09/14/2018     Priority: Medium    Chronic idiopathic constipation 09/14/2018     Priority: Medium    Vaginal atrophy 10/08/2015     Priority: Medium    Family history of diabetes mellitus 10/28/2013     Priority: Medium    GERD (gastroesophageal reflux disease) 03/17/2011     Priority: Medium    CARDIOVASCULAR SCREENING; LDL GOAL LESS THAN 160 10/31/2010     Priority: Medium    Seasonal allergies 08/11/2010     Priority: Medium      Past Medical History:   Diagnosis Date    Anxiety 01/29/2020    CARDIOVASCULAR SCREENING; LDL GOAL LESS THAN 160 10/31/2010    Cervicalgia 09/14/2018    Chronic allergic rhinitis due to other allergic trigger, unspecified seasonality 09/14/2018    Chronic idiopathic constipation 09/14/2018    Constipation, unspecified constipation type 09/14/2018    Family history of diabetes mellitus 10/28/2013    Financial problems  08/29/2023    GERD (gastroesophageal reflux disease) 03/17/2011    History of neck pain 01/19/2019    Hx of abdominal pain 01/19/2019    Insomnia, unspecified type 10/12/2018    Lateral epicondylitis of right elbow 07/03/2023    Lateral epicondylitis, unspecified laterality 07/03/2023    Medial epicondylitis of elbow, unspecified laterality 07/03/2023    Mild single current episode of major depressive disorder 09/14/2018    Non-seasonal allergic rhinitis 09/14/2018    Palpitations 06/09/2022    Radial styloid tenosynovitis of left hand 11/09/2021    Skin macule 10/12/2018    Strain of right hamstring 08/29/2023    Vaginal atrophy 10/08/2015     Past Surgical History:   Procedure Laterality Date    COLONOSCOPY N/A 3/15/2019    Procedure: COLONOSCOPY & EGD;  Surgeon: Orlin Chaudhry MD;  Location:  GI    ESOPHAGOSCOPY, GASTROSCOPY, DUODENOSCOPY (EGD), COMBINED N/A 3/15/2019    Procedure: ESOPHAGOSCOPY, GASTROSCOPY, DUODENOSCOPY (EGD) ([facility]);  Surgeon: Orlin Chaudhry MD;  Location:  GI    HYSTERECTOMY, VAGINAL      LAPAROSCOPIC APPENDECTOMY       Current Outpatient Medications   Medication Sig Dispense Refill    polyethylene glycol (MIRALAX) 17 GM/Dose powder Take 17 g (1 Capful) by mouth daily as needed for constipation. 255 g 2    sennosides (SENOKOT) 8.6 MG tablet Take 2 tablets by mouth daily as needed for constipation. 30 tablet 0    magnesium 250 MG tablet Take 1 tablet by mouth daily. (Patient not taking: Reported on 6/26/2025)         Allergies   Allergen Reactions    Sinus & Allergy [Pseudoephedrine]      Gets sinuses from pollin         Social History     Tobacco Use    Smoking status: Never     Passive exposure: Never    Smokeless tobacco: Never   Substance Use Topics    Alcohol use: No     Alcohol/week: 0.0 standard drinks of alcohol       History   Drug Use No             Review of Systems  Constitutional, HEENT, cardiovascular, pulmonary, GI, , musculoskeletal, neuro, skin, endocrine and psych  "systems are negative, except as otherwise noted.    Objective    /72 (BP Location: Right arm, Patient Position: Sitting, Cuff Size: Adult Regular)   Pulse 71   Temp 98  F (36.7  C) (Oral)   Resp 18   Ht 1.562 m (5' 1.5\")   Wt 61.4 kg (135 lb 4.8 oz)   LMP  (LMP Unknown)   SpO2 98%   BMI 25.15 kg/m     Estimated body mass index is 25.15 kg/m  as calculated from the following:    Height as of this encounter: 1.562 m (5' 1.5\").    Weight as of this encounter: 61.4 kg (135 lb 4.8 oz).  Physical Exam  Constitutional:       Appearance: Normal appearance.   HENT:      Head: Normocephalic and atraumatic.      Right Ear: Tympanic membrane normal.      Left Ear: Tympanic membrane normal.      Nose: Nose normal.      Mouth/Throat:      Mouth: Mucous membranes are moist.      Pharynx: Oropharynx is clear.   Eyes:      Extraocular Movements: Extraocular movements intact.      Conjunctiva/sclera: Conjunctivae normal.   Cardiovascular:      Rate and Rhythm: Normal rate and regular rhythm.      Heart sounds: Normal heart sounds.   Pulmonary:      Effort: Pulmonary effort is normal.      Breath sounds: Normal breath sounds.   Abdominal:      General: Abdomen is flat.      Palpations: Abdomen is soft.   Musculoskeletal:         General: Normal range of motion.      Cervical back: Normal range of motion and neck supple.   Skin:     General: Skin is warm.   Neurological:      General: No focal deficit present.      Mental Status: She is alert and oriented to person, place, and time. Mental status is at baseline.   Psychiatric:         Mood and Affect: Mood and affect normal.         Speech: Speech normal.         Behavior: Behavior normal. Behavior is cooperative.         Thought Content: Thought content normal.         Cognition and Memory: Cognition normal.         Judgment: Judgment normal.           Recent Labs   Lab Test 11/19/24  1414   HGB 12.9         POTASSIUM 4.3   CR 0.81   A1C 5.7*    "     Diagnostics  No labs were ordered during this visit.   No EKG required for low risk surgery (cataract, skin procedure, breast biopsy, etc).    Revised Cardiac Risk Index (RCRI)  The patient has the following serious cardiovascular risks for perioperative complications:   - No serious cardiac risks = 0 points     RCRI Interpretation: 0 points: Class I (very low risk - 0.4% complication rate)         Signed Electronically by: Holly May MD  A copy of this evaluation report is provided to the requesting physician.

## 2025-06-26 NOTE — PROGRESS NOTES
Preoperative Evaluation  [facility]  [address] [facility] [address]  Phone: [phone]  Primary Provider: DAWSON Vazquez CNP  Pre-op Performing Provider: Holly May MD  Jun 26, 2025   {Provider  Link to PREOP SmartSet  REQUIRED to apply standard patient instructions and medication directions to the AVS :268952}  {ROOMER review and update patient entered surgical information if needed :171021}  { After Pre-op is completed, use lists to pull documentation into note Link to complete Pre-Op    :591013}  {Pull Surgical Information into note after flowsheet completed:422404}  Fax number for surgical facility: {:530114}    {Provider Charting Preference for Preop :526785}    Paulo Hernandez is a 65 year old, presenting for the following:  Pre-Op Exam      {(!) Visit Details have not yet been documented.  Please enter Visit Details and then use this list to pull in documentation. (Optional):146294}  HPI: ***    {Pull Pre-Op Questionnaire into note after flowsheet completed:079251}  Advance Care Planning  {The storyboard will display whether the patient has ACP docs on file. Hover over the Code section in the storyboard to access the ACP documents. :436216}  {(AWV REQUIRED) Advance Care Planning Reviewed:391562}    Preoperative Review of   {Mnpmpreport:549816}  {Review MNPMP for all patients per [facility] MNPMP Profile:287839}    {Chronic problem details (Optional) :440368}    Patient Active Problem List    Diagnosis Date Noted    Visit for screening mammogram 09/26/2023     Priority: Medium    Financial problems 08/29/2023     Priority: Medium    Strain of right hamstring 08/29/2023     Priority: Medium    Arthralgia, unspecified joint 08/29/2023     Priority: Medium    Bilateral shoulder pain, unspecified chronicity 07/03/2023     Priority: Medium    Lateral epicondylitis of right elbow 07/03/2023     Priority: Medium    Medial epicondylitis of elbow, unspecified  laterality 07/03/2023     Priority: Medium    Trochanteric bursitis of right hip 03/29/2023     Priority: Medium    Acute midline low back pain with right-sided sciatica 03/29/2023     Priority: Medium    Memory loss 03/29/2023     Priority: Medium    Cervical cancer screening 11/16/2022     Priority: Medium    Sacroiliac joint pain 11/16/2022     Priority: Medium    Palpitations 06/09/2022     Priority: Medium    Primary osteoarthritis of both first carpometacarpal joints 12/16/2021     Priority: Medium    Need for prophylactic vaccination and inoculation against influenza 11/09/2021     Priority: Medium    TMJ (temporomandibular joint syndrome) 02/04/2021     Priority: Medium    Aphthae, oral 02/04/2021     Priority: Medium    Dysesthesia 01/29/2020     Priority: Medium    Anxiety 01/29/2020     Priority: Medium    Insomnia, unspecified type 10/12/2018     Priority: Medium    Skin macule 10/12/2018     Priority: Medium    Mild single current episode of major depressive disorder 09/14/2018     Priority: Medium    Chronic idiopathic constipation 09/14/2018     Priority: Medium    Vaginal atrophy 10/08/2015     Priority: Medium    Family history of diabetes mellitus 10/28/2013     Priority: Medium    GERD (gastroesophageal reflux disease) 03/17/2011     Priority: Medium    CARDIOVASCULAR SCREENING; LDL GOAL LESS THAN 160 10/31/2010     Priority: Medium    Seasonal allergies 08/11/2010     Priority: Medium      Past Medical History:   Diagnosis Date    Anxiety 01/29/2020    CARDIOVASCULAR SCREENING; LDL GOAL LESS THAN 160 10/31/2010    Cervicalgia 09/14/2018    Chronic allergic rhinitis due to other allergic trigger, unspecified seasonality 09/14/2018    Chronic idiopathic constipation 09/14/2018    Constipation, unspecified constipation type 09/14/2018    Family history of diabetes mellitus 10/28/2013    Financial problems 08/29/2023    GERD (gastroesophageal reflux disease) 03/17/2011    History of neck pain  01/19/2019    Hx of abdominal pain 01/19/2019    Insomnia, unspecified type 10/12/2018    Lateral epicondylitis of right elbow 07/03/2023    Lateral epicondylitis, unspecified laterality 07/03/2023    Medial epicondylitis of elbow, unspecified laterality 07/03/2023    Mild single current episode of major depressive disorder 09/14/2018    Non-seasonal allergic rhinitis 09/14/2018    Palpitations 06/09/2022    Radial styloid tenosynovitis of left hand 11/09/2021    Skin macule 10/12/2018    Strain of right hamstring 08/29/2023    Vaginal atrophy 10/08/2015     Past Surgical History:   Procedure Laterality Date    COLONOSCOPY N/A 3/15/2019    Procedure: COLONOSCOPY & EGD;  Surgeon: Orlin Chaudhry MD;  Location:  GI    ESOPHAGOSCOPY, GASTROSCOPY, DUODENOSCOPY (EGD), COMBINED N/A 3/15/2019    Procedure: ESOPHAGOSCOPY, GASTROSCOPY, DUODENOSCOPY (EGD) ([facility]);  Surgeon: Orlin Chaudhry MD;  Location:  GI    HYSTERECTOMY, VAGINAL      LAPAROSCOPIC APPENDECTOMY       Current Outpatient Medications   Medication Sig Dispense Refill    magnesium 250 MG tablet Take 1 tablet by mouth daily. (Patient not taking: Reported on 3/17/2025)      polyethylene glycol (MIRALAX) 17 GM/Dose powder Take 17 g (1 Capful) by mouth daily as needed for constipation. 255 g 2    sennosides (SENOKOT) 8.6 MG tablet Take 2 tablets by mouth daily as needed for constipation. 30 tablet 0       Allergies   Allergen Reactions    Sinus & Allergy [Pseudoephedrine]      Gets sinuses from pollin         Social History     Tobacco Use    Smoking status: Never     Passive exposure: Never    Smokeless tobacco: Never   Substance Use Topics    Alcohol use: No     Alcohol/week: 0.0 standard drinks of alcohol     {FAMILY HISTORY (Optional):018432070}  History   Drug Use No           {ROS Picklists (Optional):019524}    Objective    LMP  (LMP Unknown)    Estimated body mass index is 24.91 kg/m  as calculated from the following:    Height as of 3/17/25: 1.562 m  "(5' 1.5\").    Weight as of 3/17/25: 60.8 kg (134 lb).  Physical Exam  {Exam List :037524}    Recent Labs   Lab Test 24  1414   HGB 12.9         POTASSIUM 4.3   CR 0.81   A1C 5.7*        Diagnostics  {LABS:903044}   {EK}    Revised Cardiac Risk Index (RCRI)  The patient has the following serious cardiovascular risks for perioperative complications:  {PREOP REVISED CARDIAC RISK INDEX (RCRI) :228003}     RCRI Interpretation: {REVISED CARDIAC RISK INTERPRETATION :994224}         Signed Electronically by: Holly May MD  A copy of this evaluation report is provided to the requesting physician.    {Provider Resources  Preop [facility] Preop Guidelines  Revised Cardiac Risk Index :655083}   {Email feedback regarding this note to [email or web address]   :022741}  "

## 2025-06-26 NOTE — PROGRESS NOTES
Pre-op has been sent.     Maria Dolores Mazariegos   [facility] [place]

## 2025-08-05 ENCOUNTER — OFFICE VISIT (OUTPATIENT)
Dept: NEUROLOGY | Facility: CLINIC | Age: 65
End: 2025-08-05
Payer: COMMERCIAL

## 2025-08-05 VITALS
BODY MASS INDEX: 25.89 KG/M2 | HEIGHT: 61 IN | OXYGEN SATURATION: 96 % | WEIGHT: 137.13 LBS | DIASTOLIC BLOOD PRESSURE: 75 MMHG | SYSTOLIC BLOOD PRESSURE: 126 MMHG | HEART RATE: 66 BPM

## 2025-08-05 DIAGNOSIS — R52 BURNING PAIN: Primary | ICD-10-CM

## 2025-08-06 ENCOUNTER — LAB (OUTPATIENT)
Dept: LAB | Facility: CLINIC | Age: 65
End: 2025-08-06
Payer: COMMERCIAL

## 2025-08-06 DIAGNOSIS — R52 BURNING PAIN: ICD-10-CM

## 2025-08-06 LAB
EST. AVERAGE GLUCOSE BLD GHB EST-MCNC: 111 MG/DL
HBA1C MFR BLD: 5.5 % (ref 0–5.6)
TSH SERPL DL<=0.005 MIU/L-ACNC: 1.69 UIU/ML (ref 0.3–4.2)
VIT B12 SERPL-MCNC: 1259 PG/ML (ref 232–1245)

## 2025-08-06 PROCEDURE — 99000 SPECIMEN HANDLING OFFICE-LAB: CPT

## 2025-08-06 PROCEDURE — 82607 VITAMIN B-12: CPT

## 2025-08-06 PROCEDURE — 84443 ASSAY THYROID STIM HORMONE: CPT

## 2025-08-06 PROCEDURE — 36415 COLL VENOUS BLD VENIPUNCTURE: CPT

## 2025-08-06 PROCEDURE — 83036 HEMOGLOBIN GLYCOSYLATED A1C: CPT

## 2025-08-06 PROCEDURE — 84425 ASSAY OF VITAMIN B-1: CPT | Mod: 90

## 2025-08-09 LAB — VIT B1 PYROPHOSHATE BLD-SCNC: 162 NMOL/L

## (undated) DEVICE — ENDO BITE BLOCK ADULT OLYMPUS LATEX FREE MAJ-1632

## (undated) DEVICE — KIT ENDO TURNOVER/PROCEDURE W/CLEAN A SCOPE LINERS 103888

## (undated) RX ORDER — FENTANYL CITRATE 50 UG/ML
INJECTION, SOLUTION INTRAMUSCULAR; INTRAVENOUS
Status: DISPENSED
Start: 2019-03-15